# Patient Record
Sex: FEMALE | Race: WHITE | Employment: OTHER | URBAN - METROPOLITAN AREA
[De-identification: names, ages, dates, MRNs, and addresses within clinical notes are randomized per-mention and may not be internally consistent; named-entity substitution may affect disease eponyms.]

---

## 2018-07-13 ENCOUNTER — APPOINTMENT (OUTPATIENT)
Dept: GENERAL RADIOLOGY | Age: 73
DRG: 854 | End: 2018-07-13
Attending: EMERGENCY MEDICINE
Payer: MEDICARE

## 2018-07-13 ENCOUNTER — HOSPITAL ENCOUNTER (INPATIENT)
Age: 73
LOS: 18 days | Discharge: HOME HEALTH | DRG: 854 | End: 2018-07-31
Attending: EMERGENCY MEDICINE | Admitting: INTERNAL MEDICINE
Payer: MEDICARE

## 2018-07-13 DIAGNOSIS — D75.81 MYELOFIBROSIS (HCC): Primary | ICD-10-CM

## 2018-07-13 DIAGNOSIS — R50.81 FEVER IN OTHER DISEASES: ICD-10-CM

## 2018-07-13 DIAGNOSIS — D69.6 THROMBOCYTOPENIA (HCC): ICD-10-CM

## 2018-07-13 DIAGNOSIS — E87.6 HYPOKALEMIA: ICD-10-CM

## 2018-07-13 DIAGNOSIS — K61.1 PERI-RECTAL ABSCESS: ICD-10-CM

## 2018-07-13 DIAGNOSIS — D72.823 LEUKEMOID REACTION: ICD-10-CM

## 2018-07-13 PROBLEM — R50.9 FEVER: Status: ACTIVE | Noted: 2018-07-13

## 2018-07-13 LAB
ALBUMIN SERPL-MCNC: 3.7 G/DL (ref 3.5–5)
ALBUMIN/GLOB SERPL: 1.1 {RATIO} (ref 1.1–2.2)
ALP SERPL-CCNC: 82 U/L (ref 45–117)
ALT SERPL-CCNC: 40 U/L (ref 12–78)
ANION GAP SERPL CALC-SCNC: 16 MMOL/L (ref 5–15)
APPEARANCE UR: CLEAR
AST SERPL-CCNC: 57 U/L (ref 15–37)
BACTERIA URNS QL MICRO: NEGATIVE /HPF
BILIRUB SERPL-MCNC: 1.5 MG/DL (ref 0.2–1)
BILIRUB UR QL: NEGATIVE
BUN SERPL-MCNC: 13 MG/DL (ref 6–20)
BUN/CREAT SERPL: 12 (ref 12–20)
CALCIUM SERPL-MCNC: 8.1 MG/DL (ref 8.5–10.1)
CHLORIDE SERPL-SCNC: 100 MMOL/L (ref 97–108)
CO2 SERPL-SCNC: 21 MMOL/L (ref 21–32)
COLOR UR: ABNORMAL
CREAT SERPL-MCNC: 1.09 MG/DL (ref 0.55–1.02)
EPITH CASTS URNS QL MICRO: ABNORMAL /LPF
FLUAV AG NPH QL IA: NEGATIVE
FLUBV AG NOSE QL IA: NEGATIVE
GLOBULIN SER CALC-MCNC: 3.5 G/DL (ref 2–4)
GLUCOSE SERPL-MCNC: 192 MG/DL (ref 65–100)
GLUCOSE UR STRIP.AUTO-MCNC: NEGATIVE MG/DL
HGB UR QL STRIP: ABNORMAL
HYALINE CASTS URNS QL MICRO: ABNORMAL /LPF (ref 0–5)
KETONES UR QL STRIP.AUTO: 15 MG/DL
LACTATE SERPL-SCNC: 1.8 MMOL/L (ref 0.4–2)
LEUKOCYTE ESTERASE UR QL STRIP.AUTO: NEGATIVE
NITRITE UR QL STRIP.AUTO: NEGATIVE
PH UR STRIP: 6 [PH] (ref 5–8)
POTASSIUM SERPL-SCNC: 3 MMOL/L (ref 3.5–5.1)
PROT SERPL-MCNC: 7.2 G/DL (ref 6.4–8.2)
PROT UR STRIP-MCNC: 100 MG/DL
RBC #/AREA URNS HPF: ABNORMAL /HPF (ref 0–5)
SODIUM SERPL-SCNC: 137 MMOL/L (ref 136–145)
SP GR UR REFRACTOMETRY: 1.02 (ref 1–1.03)
UROBILINOGEN UR QL STRIP.AUTO: 0.2 EU/DL (ref 0.2–1)
WBC URNS QL MICRO: ABNORMAL /HPF (ref 0–4)

## 2018-07-13 PROCEDURE — 87086 URINE CULTURE/COLONY COUNT: CPT | Performed by: EMERGENCY MEDICINE

## 2018-07-13 PROCEDURE — 80053 COMPREHEN METABOLIC PANEL: CPT | Performed by: EMERGENCY MEDICINE

## 2018-07-13 PROCEDURE — 87040 BLOOD CULTURE FOR BACTERIA: CPT | Performed by: EMERGENCY MEDICINE

## 2018-07-13 PROCEDURE — 51701 INSERT BLADDER CATHETER: CPT

## 2018-07-13 PROCEDURE — 65660000000 HC RM CCU STEPDOWN

## 2018-07-13 PROCEDURE — 85025 COMPLETE CBC W/AUTO DIFF WBC: CPT | Performed by: EMERGENCY MEDICINE

## 2018-07-13 PROCEDURE — 77030005563 HC CATH URETH INT MMGH -A

## 2018-07-13 PROCEDURE — 99285 EMERGENCY DEPT VISIT HI MDM: CPT

## 2018-07-13 PROCEDURE — 96365 THER/PROPH/DIAG IV INF INIT: CPT

## 2018-07-13 PROCEDURE — 74011250637 HC RX REV CODE- 250/637: Performed by: EMERGENCY MEDICINE

## 2018-07-13 PROCEDURE — 87804 INFLUENZA ASSAY W/OPTIC: CPT | Performed by: EMERGENCY MEDICINE

## 2018-07-13 PROCEDURE — 71045 X-RAY EXAM CHEST 1 VIEW: CPT

## 2018-07-13 PROCEDURE — 93005 ELECTROCARDIOGRAM TRACING: CPT

## 2018-07-13 PROCEDURE — 83605 ASSAY OF LACTIC ACID: CPT | Performed by: EMERGENCY MEDICINE

## 2018-07-13 PROCEDURE — 74011000258 HC RX REV CODE- 258: Performed by: EMERGENCY MEDICINE

## 2018-07-13 PROCEDURE — 36415 COLL VENOUS BLD VENIPUNCTURE: CPT | Performed by: EMERGENCY MEDICINE

## 2018-07-13 PROCEDURE — 74011250636 HC RX REV CODE- 250/636: Performed by: EMERGENCY MEDICINE

## 2018-07-13 PROCEDURE — 81001 URINALYSIS AUTO W/SCOPE: CPT | Performed by: EMERGENCY MEDICINE

## 2018-07-13 RX ORDER — ACETAMINOPHEN 325 MG/1
650 TABLET ORAL
Status: DISCONTINUED | OUTPATIENT
Start: 2018-07-13 | End: 2018-07-31 | Stop reason: HOSPADM

## 2018-07-13 RX ORDER — ACETAMINOPHEN 325 MG/1
650 TABLET ORAL
COMMUNITY

## 2018-07-13 RX ORDER — LEVOFLOXACIN 5 MG/ML
750 INJECTION, SOLUTION INTRAVENOUS
Status: DISCONTINUED | OUTPATIENT
Start: 2018-07-13 | End: 2018-07-16

## 2018-07-13 RX ORDER — SODIUM CHLORIDE 9 MG/ML
250 INJECTION, SOLUTION INTRAVENOUS AS NEEDED
Status: DISCONTINUED | OUTPATIENT
Start: 2018-07-13 | End: 2018-07-30

## 2018-07-13 RX ORDER — THERA TABS 400 MCG
1 TAB ORAL DAILY
COMMUNITY

## 2018-07-13 RX ORDER — SODIUM CHLORIDE 0.9 % (FLUSH) 0.9 %
5-10 SYRINGE (ML) INJECTION AS NEEDED
Status: DISCONTINUED | OUTPATIENT
Start: 2018-07-13 | End: 2018-07-30

## 2018-07-13 RX ORDER — SODIUM CHLORIDE 9 MG/ML
125 INJECTION, SOLUTION INTRAVENOUS CONTINUOUS
Status: DISCONTINUED | OUTPATIENT
Start: 2018-07-13 | End: 2018-07-15

## 2018-07-13 RX ORDER — SODIUM CHLORIDE 0.9 % (FLUSH) 0.9 %
5-10 SYRINGE (ML) INJECTION EVERY 8 HOURS
Status: DISCONTINUED | OUTPATIENT
Start: 2018-07-13 | End: 2018-07-30

## 2018-07-13 RX ORDER — DEFERASIROX 500 MG/1
1500 TABLET, FOR SUSPENSION ORAL DAILY
COMMUNITY

## 2018-07-13 RX ORDER — ONDANSETRON 2 MG/ML
4 INJECTION INTRAMUSCULAR; INTRAVENOUS
Status: DISCONTINUED | OUTPATIENT
Start: 2018-07-13 | End: 2018-07-31 | Stop reason: HOSPADM

## 2018-07-13 RX ORDER — ACETAMINOPHEN 325 MG/1
650 TABLET ORAL
Status: COMPLETED | OUTPATIENT
Start: 2018-07-13 | End: 2018-07-13

## 2018-07-13 RX ADMIN — SODIUM CHLORIDE 1000 ML: 900 INJECTION, SOLUTION INTRAVENOUS at 21:23

## 2018-07-13 RX ADMIN — SODIUM CHLORIDE 1000 ML: 900 INJECTION, SOLUTION INTRAVENOUS at 20:42

## 2018-07-13 RX ADMIN — CEFEPIME HYDROCHLORIDE 2 G: 2 INJECTION, POWDER, FOR SOLUTION INTRAVENOUS at 21:23

## 2018-07-13 RX ADMIN — ACETAMINOPHEN 650 MG: 325 TABLET ORAL at 22:00

## 2018-07-13 NOTE — IP AVS SNAPSHOT
[address] [phone] Patient: Mishel Hampton MRN: [id number] PNR:4/67/3242 A check florin indicates which time of day the medication should be taken. My Medications START taking these medications Instructions Each Dose to Equal  
 Morning Noon Evening Bedtime  
 amoxicillin-clavulanate 875-125 mg per tablet Commonly known as:  AUGMENTIN Your last dose was:  7/31/2018 
9:03am  
   
 Take 1 Tab by mouth every twelve (12) hours. 1 Tab  
    
  
   
   
  
   
  
 doxycycline 100 mg tablet Commonly known as:  VIBRA-TABS Your last dose was:  7/31/2018 
9:03am  
   
 Take 1 Tab by mouth every twelve (12) hours. 100 mg  
    
  
   
   
  
   
  
 potassium chloride 20 mEq packet Commonly known as:  KLOR-CON Your last dose was:  7/21/2018 11:20am  
   
 Take 1 Packet by mouth daily. 20 mEq  
    
  
   
   
   
  
 senna 8.6 mg tablet Commonly known as:  Tomi Garza Your last dose was:  7/30/2018 8:35pm  
   
 Take 5 Tabs by mouth nightly. PRN  
 5 Tab CHANGE how you take these medications Instructions Each Dose to Equal  
 Morning Noon Evening Bedtime  
 ruxolitinib 15 mg Tab Commonly known as:  Kolby Robert What changed:  when to take this Your last dose was:  7/31/2018 
9:03am  
   
 Take 1 Tab by mouth daily. 15 mg CONTINUE taking these medications Instructions Each Dose to Equal  
 Morning Noon Evening Bedtime  
 acetaminophen 325 mg tablet Commonly known as:  TYLENOL Your last dose was:  7/30/2018 
8:34pm  
   
 Take 650 mg by mouth every four (4) hours as needed for Pain. 650 mg EXJADE 500 mg oral disentegrating tablet Generic drug:  deferasirox Take 1,500 mg by mouth daily. 1500 mg  
    
   
   
   
  
 therapeutic multivitamin tablet Commonly known as:  [facility]  
 Take 1 Tab by mouth daily. 1 Tab Where to Get Your Medications [facility] [id number] Phone:  [phone]  
  amoxicillin-clavulanate 875-125 mg per tablet  
 doxycycline 100 mg tablet  
 potassium chloride 20 mEq packet Information on where to get these meds will be given to you by the nurse or doctor. ! Ask your nurse or doctor about these medications  
  ruxolitinib 15 mg Tab  
 senna 8.6 mg tablet

## 2018-07-13 NOTE — IP AVS SNAPSHOT
[address] [place] [address] 
[phone] Patient: Osmel Restrepo MRN: [id number] EDJ:[birth year] About your hospitalization You were admitted on:  July 13, 2018 You last received care in the:  [facility] 4M POST SURG ORT 1 You were discharged on:  July 31, 2018 Why you were hospitalized Your primary diagnosis was:  Fever Your diagnoses also included:  Myelofibrosis (Hcc), Thrombocytopenia (Hcc), Hypokalemia, Leukocytosis, Nausea And Vomiting, Sirs (Systemic Inflammatory Response Syndrome) (Hcc), Rhona-Rectal Abscess Follow-up Information Follow up With Details Comments Contact Info [facility]  THEY WILL CONTACT YOU TO SCHEDULE A HOME VISIT [address] [facility] [address] [place] [address] Shahriar Story MD On 8/6/2018 Hospital PCP f/u appointment on Monday August 6 @ 4:30 p.m. [address] 
[phone] Discharge Orders None A check florin indicates which time of day the medication should be taken. My Medications START taking these medications Instructions Each Dose to Equal  
 Morning Noon Evening Bedtime  
 amoxicillin-clavulanate 875-125 mg per tablet Commonly known as:  AUGMENTIN Your last dose was:  7/31/2018 
9:03am  
   
 Take 1 Tab by mouth every twelve (12) hours. 1 Tab  
    
  
   
   
  
   
  
 doxycycline 100 mg tablet Commonly known as:  VIBRA-TABS Your last dose was:  7/31/2018 
9:03am  
   
 Take 1 Tab by mouth every twelve (12) hours. 100 mg  
    
  
   
   
  
   
  
 potassium chloride 20 mEq packet Commonly known as:  KLOR-CON Your last dose was:  7/21/2018 11:20am  
   
 Take 1 Packet by mouth daily. 20 mEq  
    
  
   
   
   
  
 senna 8.6 mg tablet Commonly known as:  Tomi Chong Sons Your last dose was:  7/30/2018 8:35pm  
   
 Take 5 Tabs by mouth nightly. PRN  
 5 Tab CHANGE how you take these medications Instructions Each Dose to Equal  
 Morning Noon Evening Bedtime  
 ruxolitinib 15 mg Tab Commonly known as:  Marcellus Márquez What changed:  when to take this Your last dose was:  2018 
9:03am  
   
 Take 1 Tab by mouth daily. 15 mg CONTINUE taking these medications Instructions Each Dose to Equal  
 Morning Noon Evening Bedtime  
 acetaminophen 325 mg tablet Commonly known as:  TYLENOL Your last dose was:  2018 
8:34pm  
   
 Take 650 mg by mouth every four (4) hours as needed for Pain. 650 mg EXJADE 500 mg oral disentegrating tablet Generic drug:  deferasirox Take 1,500 mg by mouth daily. 1500 mg  
    
   
   
   
  
 therapeutic multivitamin tablet Commonly known as:  [facility] Take 1 Tab by mouth daily. 1 Tab Where to Get Your Medications These medications were sent to [facility] [id number] Phone:  [phone]  
  amoxicillin-clavulanate 875-125 mg per tablet  
 doxycycline 100 mg tablet  
 potassium chloride 20 mEq packet Information on where to get these meds will be given to you by the nurse or doctor. ! Ask your nurse or doctor about these medications  
  ruxolitinib 15 mg Tab  
 senna 8.6 mg tablet Discharge Instructions Ruxolitinib [facility]) - patient's own med. Please send home with the patient upon discharge. Patient Discharge Instructions Padma Edmonds / [id number] : 1945 Admitted 2018 Discharged: 2018 8:24 AM  
 
ACUTE DIAGNOSES: 
Fever PERIRECTAL ABSCESS PERIRECTAL ABSCESS PERIRECTAL ABSCESS 
 
CHRONIC MEDICAL DIAGNOSES: 
Problem List as of 2018  Date Reviewed: 2018 Codes Class Noted - Resolved  [facility] ICD-10-CM: X60.97 
 ICD-9-CM: 289.83  7/14/2018 - Present Thrombocytopenia ([facility] ICD-10-CM: D69.6 ICD-9-CM: 287.5  7/14/2018 - Present Hypokalemia ICD-10-CM: E87.6 ICD-9-CM: 276.8  7/14/2018 - Present Leukocytosis ICD-10-CM: L22.530 ICD-9-CM: 288.60  7/14/2018 - Present Nausea and vomiting ICD-10-CM: R11.2 ICD-9-CM: 787.01  7/14/2018 - Present SIRS (systemic inflammatory response syndrome) (HCC) ICD-10-CM: R65.10 ICD-9-CM: 995.90  7/14/2018 - Present Rhona-rectal abscess ICD-10-CM: K61.1 ICD-9-CM: 121  7/14/2018 - Present * (Principal)Fever ICD-10-CM: R50.9 ICD-9-CM: 780.60  7/13/2018 - Present DISCHARGE MEDICATIONS:  
 
 
 
· It is important that you take the medication exactly as they are prescribed. · Keep your medication in the bottles provided by the pharmacist and keep a list of the medication names, dosages, and times to be taken in your wallet. · Do not take other medications without consulting your doctor. DIET:  Regular Diet ACTIVITY: Activity as tolerated ADDITIONAL INFORMATION: If you experience any of the following symptoms then please call your primary care physician or return to the emergency room if you cannot get hold of your doctor: Fever, chills, nausea, vomiting, diarrhea, change in mentation, falling, bleeding, shortness of breath. FOLLOW UP CARE: 
Dr. Lucas Pruitt MD  you are to call and set up an appointment to see them with in 1 week. Follow-up with specialists at directed by them Information obtained by : 
I understand that if any problems occur once I am at home I am to contact my physician. I understand and acknowledge receipt of the instructions indicated above. Physician's or R.N.'s Signature                                                                  Date/Time Patient or Representative Signature                                                          Date/Time [place] Announcement We are excited to announce that we are making your provider's discharge notes available to you in [employer]. You will see these notes when they are completed and signed by the physician that discharged you from your recent hospital stay. If you have any questions or concerns about any information you see in [employer], please call the Health Information Department where you were seen or reach out to your Primary Care Provider for more information about your plan of care. Introducing [facility] SERVICES! [employer] introduces [employer] patient portal. Now you can access parts of your medical record, email your doctor's office, and request medication refills online. 1. In your internet browser, go to [email or web address] 2. Click on the First Time User? Click Here link in the Sign In box. You will see the New Member Sign Up page. 3. Enter your [employer] Access Code exactly as it appears below. You will not need to use this code after youve completed the sign-up process. If you do not sign up before the expiration date, you must request a new code. · [employer] Access Code: [email or web address] Expires: 10/11/2018  8:13 PM 
 
4. Enter the last four digits of your Social Security Number (xxxx) and Date of Birth (mm/dd/yyyy) as indicated and click Submit. You will be taken to the next sign-up page. 5. Create a [employer] ID. This will be your [employer] login ID and cannot be changed, so think of one that is secure and easy to remember. 6. Create a [employer] password. You can change your password at any time. 7. Enter your Password Reset Question and Answer. This can be used at a later time if you forget your password. 8. Enter your e-mail address. You will receive e-mail notification when new information is available in [address] 9. Click Sign Up. You can now view and download portions of your medical record. 10. Click the Download Summary menu link to download a portable copy of your medical information. If you have questions, please visit the Frequently Asked Questions section of the [employer] website. Remember, [employer] is NOT to be used for urgent needs. For medical emergencies, dial 911. Now available from your iPhone and Android! Introducing Jordy Babcock As a [facility] patient, I wanted to make you aware of our electronic visit tool called Jordy Babcock. [employer] 24/7 allows you to connect within minutes with a medical provider 24 hours a day, seven days a week via a mobile device or tablet or logging into a secure website from your computer. You can access Jordy Babcock from anywhere in the [place]. A virtual visit might be right for you when you have a simple condition and feel like you just dont want to get out of bed, or cant get away from work for an appointment, when your regular [employer] provider is not available (evenings, weekends or holidays), or when youre out of town and need minor care. Electronic visits cost only $49 and if the [employer] 24/7 provider determines a prescription is needed to treat your condition, one can be electronically transmitted to a nearby pharmacy*. Please take a moment to enroll today if you have not already done so. The enrollment process is free and takes just a few minutes. To enroll, please download the [facility] 24/7 jessica to your tablet or phone, or visit [email or web address] to enroll on your computer.    
And, as an [address] [place] [address] patient with a My 1% account, the results of your visits will be scanned into your electronic medical record and your primary care provider will be able to view the scanned results. We urge you to continue to see your regular [facility] provider for your ongoing medical care. And while your primary care provider may not be the one available when you seek a [employer] virtual visit, the peace of mind you get from getting a real diagnosis real time can be priceless. For more information on [employer], view our Frequently Asked Questions (FAQs) at [email or web address]. Sincerely, 
 
Violet Santana MD 
Chief Medical Officer David John *:  certain medications cannot be prescribed via [employer] Providers Seen During Your Hospitalization Provider Specialty Primary office phone Ortiz Sprague MD Emergency Medicine [phone] Janice Craig MD Internal Medicine [phone] Nunu Aaron MD [facility] [phone] Your Primary Care Physician (PCP) Primary Care Physician Office Phone Office Fax Tyshawn Raquel [phone] You are allergic to the following No active allergies Recent Documentation Height Weight BMI OB Status Smoking Status 1.676 m 64.2 kg 22.84 kg/m2 Postmenopausal Never Smoker Emergency Contacts Name Discharge Info Relation Home Work Mobile [address] [facility] CAREGIVER [3] Daughter [21] [phone] [address] CAREGIVER [3] Spouse [3] [phone] Patient Belongings The following personal items are in your possession at time of discharge: 
  Dental Appliances: None  Visual Aid: Glasses, At bedside, With patient      Home Medications: None   Jewelry: None  Clothing: None    Other Valuables: None Please provide this summary of care documentation to your next provider. Signatures-by signing, you are acknowledging that this After Visit Summary has been reviewed with you and you have received a copy.   
  
 
  
    
    
 Patient Signature: ____________________________________________________________ Date:  ____________________________________________________________  
  
Salvatore Police Provider Signature:  ____________________________________________________________ Date:  ____________________________________________________________

## 2018-07-14 ENCOUNTER — ANESTHESIA EVENT (OUTPATIENT)
Dept: SURGERY | Age: 73
DRG: 854 | End: 2018-07-14
Payer: MEDICARE

## 2018-07-14 ENCOUNTER — DOCUMENTATION ONLY (OUTPATIENT)
Dept: ONCOLOGY | Age: 73
End: 2018-07-14

## 2018-07-14 ENCOUNTER — ANESTHESIA (OUTPATIENT)
Dept: SURGERY | Age: 73
DRG: 854 | End: 2018-07-14
Payer: MEDICARE

## 2018-07-14 PROBLEM — D72.829 LEUKOCYTOSIS: Status: ACTIVE | Noted: 2018-07-14

## 2018-07-14 PROBLEM — E87.6 HYPOKALEMIA: Status: ACTIVE | Noted: 2018-07-14

## 2018-07-14 PROBLEM — D69.6 THROMBOCYTOPENIA (HCC): Status: ACTIVE | Noted: 2018-07-14

## 2018-07-14 PROBLEM — D75.81 MYELOFIBROSIS (HCC): Status: ACTIVE | Noted: 2018-07-14

## 2018-07-14 PROBLEM — R11.2 NAUSEA AND VOMITING: Status: ACTIVE | Noted: 2018-07-14

## 2018-07-14 PROBLEM — R65.10 SIRS (SYSTEMIC INFLAMMATORY RESPONSE SYNDROME) (HCC): Status: ACTIVE | Noted: 2018-07-14

## 2018-07-14 PROBLEM — K61.1 PERI-RECTAL ABSCESS: Status: ACTIVE | Noted: 2018-07-14

## 2018-07-14 LAB
ALBUMIN SERPL-MCNC: 3 G/DL (ref 3.5–5)
ALBUMIN/GLOB SERPL: 1 {RATIO} (ref 1.1–2.2)
ALP SERPL-CCNC: 70 U/L (ref 45–117)
ALT SERPL-CCNC: 35 U/L (ref 12–78)
ANION GAP SERPL CALC-SCNC: 13 MMOL/L (ref 5–15)
AST SERPL-CCNC: 46 U/L (ref 15–37)
ATRIAL RATE: 103 BPM
BASOPHILS # BLD: 0 K/UL (ref 0–0.1)
BASOPHILS # BLD: 0 K/UL (ref 0–0.1)
BASOPHILS NFR BLD: 0 % (ref 0–1)
BASOPHILS NFR BLD: 0 % (ref 0–1)
BILIRUB SERPL-MCNC: 1 MG/DL (ref 0.2–1)
BLASTS NFR BLD MANUAL: 5 %
BLASTS NFR BLD MANUAL: 8 %
BUN SERPL-MCNC: 14 MG/DL (ref 6–20)
BUN/CREAT SERPL: 13 (ref 12–20)
CALCIUM SERPL-MCNC: 7.3 MG/DL (ref 8.5–10.1)
CALCULATED P AXIS, ECG09: 37 DEGREES
CALCULATED R AXIS, ECG10: 5 DEGREES
CALCULATED T AXIS, ECG11: -128 DEGREES
CHLORIDE SERPL-SCNC: 106 MMOL/L (ref 97–108)
CO2 SERPL-SCNC: 20 MMOL/L (ref 21–32)
CREAT SERPL-MCNC: 1.11 MG/DL (ref 0.55–1.02)
DIAGNOSIS, 93000: NORMAL
DIFFERENTIAL METHOD BLD: ABNORMAL
DIFFERENTIAL METHOD BLD: ABNORMAL
EOSINOPHIL # BLD: 0 K/UL (ref 0–0.4)
EOSINOPHIL # BLD: 0 K/UL (ref 0–0.4)
EOSINOPHIL NFR BLD: 0 % (ref 0–7)
EOSINOPHIL NFR BLD: 0 % (ref 0–7)
ERYTHROCYTE [DISTWIDTH] IN BLOOD BY AUTOMATED COUNT: 19.9 % (ref 11.5–14.5)
ERYTHROCYTE [DISTWIDTH] IN BLOOD BY AUTOMATED COUNT: 20.3 % (ref 11.5–14.5)
GLOBULIN SER CALC-MCNC: 3 G/DL (ref 2–4)
GLUCOSE SERPL-MCNC: 148 MG/DL (ref 65–100)
HCT VFR BLD AUTO: 18.2 % (ref 35–47)
HCT VFR BLD AUTO: 22.3 % (ref 35–47)
HCT VFR BLD AUTO: 27.8 % (ref 35–47)
HGB BLD-MCNC: 5.5 G/DL (ref 11.5–16)
HGB BLD-MCNC: 6.9 G/DL (ref 11.5–16)
HGB BLD-MCNC: 8.7 G/DL (ref 11.5–16)
IMM GRANULOCYTES # BLD: 0 K/UL
IMM GRANULOCYTES # BLD: 0 K/UL
IMM GRANULOCYTES NFR BLD AUTO: 0 %
IMM GRANULOCYTES NFR BLD AUTO: 0 %
LACTATE SERPL-SCNC: 1.7 MMOL/L (ref 0.4–2)
LYMPHOCYTES # BLD: 2 K/UL (ref 0.8–3.5)
LYMPHOCYTES # BLD: 2.8 K/UL (ref 0.8–3.5)
LYMPHOCYTES NFR BLD: 13 % (ref 12–49)
LYMPHOCYTES NFR BLD: 9 % (ref 12–49)
MCH RBC QN AUTO: 29.4 PG (ref 26–34)
MCH RBC QN AUTO: 29.7 PG (ref 26–34)
MCHC RBC AUTO-ENTMCNC: 30.2 G/DL (ref 30–36.5)
MCHC RBC AUTO-ENTMCNC: 30.9 G/DL (ref 30–36.5)
MCV RBC AUTO: 96.1 FL (ref 80–99)
MCV RBC AUTO: 97.3 FL (ref 80–99)
METAMYELOCYTES NFR BLD MANUAL: 4 %
MONOCYTES # BLD: 5 K/UL (ref 0–1)
MONOCYTES # BLD: 6.6 K/UL (ref 0–1)
MONOCYTES NFR BLD: 22 % (ref 5–13)
MONOCYTES NFR BLD: 30 % (ref 5–13)
MYELOCYTES NFR BLD MANUAL: 1 %
NEUTS BAND NFR BLD MANUAL: 6 % (ref 0–6)
NEUTS SEG # BLD: 11.4 K/UL (ref 1.8–8)
NEUTS SEG # BLD: 12.5 K/UL (ref 1.8–8)
NEUTS SEG NFR BLD: 49 % (ref 32–75)
NEUTS SEG NFR BLD: 52 % (ref 32–75)
NRBC # BLD: 1.36 K/UL (ref 0–0.01)
NRBC # BLD: 2.31 K/UL (ref 0–0.01)
NRBC BLD-RTO: 10.2 PER 100 WBC
NRBC BLD-RTO: 6.2 PER 100 WBC
P-R INTERVAL, ECG05: 134 MS
PATH REV BLD -IMP: ABNORMAL
PLATELET # BLD AUTO: 57 K/UL (ref 150–400)
PLATELET # BLD AUTO: 69 K/UL (ref 150–400)
PMV BLD AUTO: 10.5 FL (ref 8.9–12.9)
POTASSIUM SERPL-SCNC: 3.3 MMOL/L (ref 3.5–5.1)
PROMYELOCYTES NFR BLD MANUAL: 1 %
PROT SERPL-MCNC: 6 G/DL (ref 6.4–8.2)
Q-T INTERVAL, ECG07: 330 MS
QRS DURATION, ECG06: 88 MS
QTC CALCULATION (BEZET), ECG08: 432 MS
RBC # BLD AUTO: 1.87 M/UL (ref 3.8–5.2)
RBC # BLD AUTO: 2.32 M/UL (ref 3.8–5.2)
RBC MORPH BLD: ABNORMAL
RBC MORPH BLD: ABNORMAL
SODIUM SERPL-SCNC: 139 MMOL/L (ref 136–145)
VENTRICULAR RATE, ECG03: 103 BPM
WBC # BLD AUTO: 21.9 K/UL (ref 3.6–11)
WBC # BLD AUTO: 22.7 K/UL (ref 3.6–11)

## 2018-07-14 PROCEDURE — 74011250637 HC RX REV CODE- 250/637: Performed by: INTERNAL MEDICINE

## 2018-07-14 PROCEDURE — 86905 BLOOD TYPING RBC ANTIGENS: CPT | Performed by: EMERGENCY MEDICINE

## 2018-07-14 PROCEDURE — 76210000006 HC OR PH I REC 0.5 TO 1 HR: Performed by: SURGERY

## 2018-07-14 PROCEDURE — 86922 COMPATIBILITY TEST ANTIGLOB: CPT | Performed by: EMERGENCY MEDICINE

## 2018-07-14 PROCEDURE — 74011250637 HC RX REV CODE- 250/637: Performed by: SURGERY

## 2018-07-14 PROCEDURE — 83605 ASSAY OF LACTIC ACID: CPT | Performed by: INTERNAL MEDICINE

## 2018-07-14 PROCEDURE — 74011250636 HC RX REV CODE- 250/636: Performed by: INTERNAL MEDICINE

## 2018-07-14 PROCEDURE — 86902 BLOOD TYPE ANTIGEN DONOR EA: CPT | Performed by: EMERGENCY MEDICINE

## 2018-07-14 PROCEDURE — 86921 COMPATIBILITY TEST INCUBATE: CPT | Performed by: EMERGENCY MEDICINE

## 2018-07-14 PROCEDURE — 74011250636 HC RX REV CODE- 250/636

## 2018-07-14 PROCEDURE — 76060000032 HC ANESTHESIA 0.5 TO 1 HR: Performed by: SURGERY

## 2018-07-14 PROCEDURE — 77010033678 HC OXYGEN DAILY

## 2018-07-14 PROCEDURE — 85018 HEMOGLOBIN: CPT | Performed by: FAMILY MEDICINE

## 2018-07-14 PROCEDURE — 87186 SC STD MICRODIL/AGAR DIL: CPT | Performed by: FAMILY MEDICINE

## 2018-07-14 PROCEDURE — 87075 CULTR BACTERIA EXCEPT BLOOD: CPT | Performed by: FAMILY MEDICINE

## 2018-07-14 PROCEDURE — 87070 CULTURE OTHR SPECIMN AEROBIC: CPT | Performed by: FAMILY MEDICINE

## 2018-07-14 PROCEDURE — 86920 COMPATIBILITY TEST SPIN: CPT | Performed by: EMERGENCY MEDICINE

## 2018-07-14 PROCEDURE — 0JB90ZZ EXCISION OF BUTTOCK SUBCUTANEOUS TISSUE AND FASCIA, OPEN APPROACH: ICD-10-PCS | Performed by: SURGERY

## 2018-07-14 PROCEDURE — 85025 COMPLETE CBC W/AUTO DIFF WBC: CPT | Performed by: INTERNAL MEDICINE

## 2018-07-14 PROCEDURE — 65660000000 HC RM CCU STEPDOWN

## 2018-07-14 PROCEDURE — 86870 RBC ANTIBODY IDENTIFICATION: CPT | Performed by: EMERGENCY MEDICINE

## 2018-07-14 PROCEDURE — 77030011640 HC PAD GRND REM COVD -A: Performed by: SURGERY

## 2018-07-14 PROCEDURE — P9016 RBC LEUKOCYTES REDUCED: HCPCS | Performed by: EMERGENCY MEDICINE

## 2018-07-14 PROCEDURE — 88305 TISSUE EXAM BY PATHOLOGIST: CPT | Performed by: SURGERY

## 2018-07-14 PROCEDURE — 36415 COLL VENOUS BLD VENIPUNCTURE: CPT | Performed by: FAMILY MEDICINE

## 2018-07-14 PROCEDURE — 86901 BLOOD TYPING SEROLOGIC RH(D): CPT | Performed by: EMERGENCY MEDICINE

## 2018-07-14 PROCEDURE — 74011250637 HC RX REV CODE- 250/637: Performed by: FAMILY MEDICINE

## 2018-07-14 PROCEDURE — 87077 CULTURE AEROBIC IDENTIFY: CPT | Performed by: FAMILY MEDICINE

## 2018-07-14 PROCEDURE — 74011000250 HC RX REV CODE- 250

## 2018-07-14 PROCEDURE — 77030018836 HC SOL IRR NACL ICUM -A: Performed by: SURGERY

## 2018-07-14 PROCEDURE — 36430 TRANSFUSION BLD/BLD COMPNT: CPT

## 2018-07-14 PROCEDURE — 76010000138 HC OR TIME 0.5 TO 1 HR: Performed by: SURGERY

## 2018-07-14 PROCEDURE — 80053 COMPREHEN METABOLIC PANEL: CPT | Performed by: INTERNAL MEDICINE

## 2018-07-14 PROCEDURE — 74011000258 HC RX REV CODE- 258: Performed by: INTERNAL MEDICINE

## 2018-07-14 RX ORDER — SUCCINYLCHOLINE CHLORIDE 20 MG/ML
INJECTION INTRAMUSCULAR; INTRAVENOUS AS NEEDED
Status: DISCONTINUED | OUTPATIENT
Start: 2018-07-14 | End: 2018-07-14 | Stop reason: HOSPADM

## 2018-07-14 RX ORDER — SENNOSIDES 8.6 MG/1
2 TABLET ORAL
Status: DISCONTINUED | OUTPATIENT
Start: 2018-07-14 | End: 2018-07-18

## 2018-07-14 RX ORDER — PANTOPRAZOLE SODIUM 40 MG/1
40 TABLET, DELAYED RELEASE ORAL
Status: DISCONTINUED | OUTPATIENT
Start: 2018-07-14 | End: 2018-07-31 | Stop reason: HOSPADM

## 2018-07-14 RX ORDER — ONDANSETRON 2 MG/ML
INJECTION INTRAMUSCULAR; INTRAVENOUS AS NEEDED
Status: DISCONTINUED | OUTPATIENT
Start: 2018-07-14 | End: 2018-07-14 | Stop reason: HOSPADM

## 2018-07-14 RX ORDER — AMOXICILLIN 250 MG
1 CAPSULE ORAL DAILY
Status: DISCONTINUED | OUTPATIENT
Start: 2018-07-14 | End: 2018-07-19

## 2018-07-14 RX ORDER — ROCURONIUM BROMIDE 10 MG/ML
INJECTION, SOLUTION INTRAVENOUS AS NEEDED
Status: DISCONTINUED | OUTPATIENT
Start: 2018-07-14 | End: 2018-07-14 | Stop reason: HOSPADM

## 2018-07-14 RX ORDER — ONDANSETRON 2 MG/ML
4 INJECTION INTRAMUSCULAR; INTRAVENOUS AS NEEDED
Status: DISCONTINUED | OUTPATIENT
Start: 2018-07-14 | End: 2018-07-14 | Stop reason: HOSPADM

## 2018-07-14 RX ORDER — HYDROMORPHONE HYDROCHLORIDE 2 MG/1
1 TABLET ORAL
Status: DISCONTINUED | OUTPATIENT
Start: 2018-07-14 | End: 2018-07-31 | Stop reason: HOSPADM

## 2018-07-14 RX ORDER — POTASSIUM CHLORIDE 750 MG/1
40 TABLET, FILM COATED, EXTENDED RELEASE ORAL EVERY 4 HOURS
Status: COMPLETED | OUTPATIENT
Start: 2018-07-14 | End: 2018-07-14

## 2018-07-14 RX ORDER — PROPOFOL 10 MG/ML
INJECTION, EMULSION INTRAVENOUS AS NEEDED
Status: DISCONTINUED | OUTPATIENT
Start: 2018-07-14 | End: 2018-07-14 | Stop reason: HOSPADM

## 2018-07-14 RX ORDER — SODIUM CHLORIDE, SODIUM LACTATE, POTASSIUM CHLORIDE, CALCIUM CHLORIDE 600; 310; 30; 20 MG/100ML; MG/100ML; MG/100ML; MG/100ML
INJECTION, SOLUTION INTRAVENOUS
Status: DISCONTINUED | OUTPATIENT
Start: 2018-07-14 | End: 2018-07-14 | Stop reason: HOSPADM

## 2018-07-14 RX ORDER — OXYCODONE AND ACETAMINOPHEN 5; 325 MG/1; MG/1
1 TABLET ORAL
Status: DISCONTINUED | OUTPATIENT
Start: 2018-07-14 | End: 2018-07-31 | Stop reason: HOSPADM

## 2018-07-14 RX ORDER — SODIUM CHLORIDE 0.9 % (FLUSH) 0.9 %
5-10 SYRINGE (ML) INJECTION EVERY 8 HOURS
Status: DISCONTINUED | OUTPATIENT
Start: 2018-07-14 | End: 2018-07-14 | Stop reason: HOSPADM

## 2018-07-14 RX ORDER — DIPHENHYDRAMINE HYDROCHLORIDE 50 MG/ML
12.5 INJECTION, SOLUTION INTRAMUSCULAR; INTRAVENOUS AS NEEDED
Status: DISCONTINUED | OUTPATIENT
Start: 2018-07-14 | End: 2018-07-14 | Stop reason: HOSPADM

## 2018-07-14 RX ORDER — FENTANYL CITRATE 50 UG/ML
INJECTION, SOLUTION INTRAMUSCULAR; INTRAVENOUS AS NEEDED
Status: DISCONTINUED | OUTPATIENT
Start: 2018-07-14 | End: 2018-07-14 | Stop reason: HOSPADM

## 2018-07-14 RX ORDER — LIDOCAINE HYDROCHLORIDE 10 MG/ML
0.1 INJECTION, SOLUTION EPIDURAL; INFILTRATION; INTRACAUDAL; PERINEURAL AS NEEDED
Status: DISCONTINUED | OUTPATIENT
Start: 2018-07-14 | End: 2018-07-14 | Stop reason: HOSPADM

## 2018-07-14 RX ORDER — SODIUM CHLORIDE, SODIUM LACTATE, POTASSIUM CHLORIDE, CALCIUM CHLORIDE 600; 310; 30; 20 MG/100ML; MG/100ML; MG/100ML; MG/100ML
125 INJECTION, SOLUTION INTRAVENOUS CONTINUOUS
Status: DISCONTINUED | OUTPATIENT
Start: 2018-07-14 | End: 2018-07-14 | Stop reason: HOSPADM

## 2018-07-14 RX ORDER — SODIUM CHLORIDE 0.9 % (FLUSH) 0.9 %
5-10 SYRINGE (ML) INJECTION AS NEEDED
Status: DISCONTINUED | OUTPATIENT
Start: 2018-07-14 | End: 2018-07-14 | Stop reason: HOSPADM

## 2018-07-14 RX ORDER — HYDROMORPHONE HYDROCHLORIDE 1 MG/ML
.25-1 INJECTION, SOLUTION INTRAMUSCULAR; INTRAVENOUS; SUBCUTANEOUS
Status: DISCONTINUED | OUTPATIENT
Start: 2018-07-14 | End: 2018-07-14 | Stop reason: HOSPADM

## 2018-07-14 RX ORDER — POLYETHYLENE GLYCOL 3350 17 G/17G
17 POWDER, FOR SOLUTION ORAL
Status: DISCONTINUED | OUTPATIENT
Start: 2018-07-14 | End: 2018-07-17

## 2018-07-14 RX ORDER — POTASSIUM CHLORIDE 750 MG/1
40 TABLET, FILM COATED, EXTENDED RELEASE ORAL
Status: COMPLETED | OUTPATIENT
Start: 2018-07-14 | End: 2018-07-14

## 2018-07-14 RX ADMIN — PROPOFOL 100 MG: 10 INJECTION, EMULSION INTRAVENOUS at 13:44

## 2018-07-14 RX ADMIN — ONDANSETRON 4 MG: 2 INJECTION INTRAMUSCULAR; INTRAVENOUS at 21:14

## 2018-07-14 RX ADMIN — Medication 10 ML: at 01:42

## 2018-07-14 RX ADMIN — ONDANSETRON 4 MG: 2 INJECTION INTRAMUSCULAR; INTRAVENOUS at 06:53

## 2018-07-14 RX ADMIN — POTASSIUM CHLORIDE 40 MEQ: 750 TABLET, EXTENDED RELEASE ORAL at 06:50

## 2018-07-14 RX ADMIN — CEFEPIME HYDROCHLORIDE 2 G: 2 INJECTION, POWDER, FOR SOLUTION INTRAVENOUS at 08:04

## 2018-07-14 RX ADMIN — STANDARDIZED SENNA CONCENTRATE AND DOCUSATE SODIUM 1 TABLET: 8.6; 5 TABLET, FILM COATED ORAL at 01:11

## 2018-07-14 RX ADMIN — ONDANSETRON 4 MG: 2 INJECTION INTRAMUSCULAR; INTRAVENOUS at 17:31

## 2018-07-14 RX ADMIN — SODIUM CHLORIDE 125 ML/HR: 900 INJECTION, SOLUTION INTRAVENOUS at 01:13

## 2018-07-14 RX ADMIN — ACETAMINOPHEN 650 MG: 325 TABLET ORAL at 06:50

## 2018-07-14 RX ADMIN — VANCOMYCIN HYDROCHLORIDE 1750 MG: 10 INJECTION, POWDER, LYOPHILIZED, FOR SOLUTION INTRAVENOUS at 01:39

## 2018-07-14 RX ADMIN — Medication 10 ML: at 17:37

## 2018-07-14 RX ADMIN — CEFEPIME HYDROCHLORIDE 2 G: 2 INJECTION, POWDER, FOR SOLUTION INTRAVENOUS at 20:38

## 2018-07-14 RX ADMIN — POTASSIUM CHLORIDE 40 MEQ: 750 TABLET, EXTENDED RELEASE ORAL at 01:42

## 2018-07-14 RX ADMIN — Medication 10 ML: at 21:21

## 2018-07-14 RX ADMIN — POTASSIUM CHLORIDE 40 MEQ: 750 TABLET, EXTENDED RELEASE ORAL at 11:27

## 2018-07-14 RX ADMIN — ACETAMINOPHEN 650 MG: 325 TABLET ORAL at 02:56

## 2018-07-14 RX ADMIN — ONDANSETRON 4 MG: 2 INJECTION INTRAMUSCULAR; INTRAVENOUS at 14:09

## 2018-07-14 RX ADMIN — SUCCINYLCHOLINE CHLORIDE 180 MG: 20 INJECTION INTRAMUSCULAR; INTRAVENOUS at 13:44

## 2018-07-14 RX ADMIN — POLYETHYLENE GLYCOL 3350 17 G: 17 POWDER, FOR SOLUTION ORAL at 01:11

## 2018-07-14 RX ADMIN — ONDANSETRON 4 MG: 2 INJECTION INTRAMUSCULAR; INTRAVENOUS at 11:28

## 2018-07-14 RX ADMIN — Medication 10 ML: at 08:05

## 2018-07-14 RX ADMIN — ONDANSETRON 4 MG: 2 INJECTION INTRAMUSCULAR; INTRAVENOUS at 03:00

## 2018-07-14 RX ADMIN — ROCURONIUM BROMIDE 5 MG: 10 INJECTION, SOLUTION INTRAVENOUS at 13:44

## 2018-07-14 RX ADMIN — ACETAMINOPHEN 650 MG: 325 TABLET ORAL at 11:28

## 2018-07-14 RX ADMIN — SODIUM CHLORIDE, SODIUM LACTATE, POTASSIUM CHLORIDE, CALCIUM CHLORIDE: 600; 310; 30; 20 INJECTION, SOLUTION INTRAVENOUS at 13:43

## 2018-07-14 RX ADMIN — SENNOSIDES 17.2 MG: 8.6 TABLET, FILM COATED ORAL at 21:14

## 2018-07-14 RX ADMIN — OXYCODONE HYDROCHLORIDE AND ACETAMINOPHEN 1 TABLET: 5; 325 TABLET ORAL at 21:14

## 2018-07-14 RX ADMIN — ACETAMINOPHEN 650 MG: 325 TABLET ORAL at 17:31

## 2018-07-14 RX ADMIN — SUCCINYLCHOLINE CHLORIDE 30 MG: 20 INJECTION INTRAMUSCULAR; INTRAVENOUS at 14:01

## 2018-07-14 RX ADMIN — PANTOPRAZOLE SODIUM 40 MG: 40 TABLET, DELAYED RELEASE ORAL at 06:53

## 2018-07-14 RX ADMIN — LEVOFLOXACIN 750 MG: 5 INJECTION, SOLUTION INTRAVENOUS at 01:13

## 2018-07-14 RX ADMIN — SODIUM CHLORIDE 125 ML/HR: 900 INJECTION, SOLUTION INTRAVENOUS at 22:14

## 2018-07-14 RX ADMIN — FENTANYL CITRATE 25 MCG: 50 INJECTION, SOLUTION INTRAMUSCULAR; INTRAVENOUS at 14:00

## 2018-07-14 RX ADMIN — FENTANYL CITRATE 50 MCG: 50 INJECTION, SOLUTION INTRAMUSCULAR; INTRAVENOUS at 13:56

## 2018-07-14 NOTE — PROGRESS NOTES
Daily Progress Note: 7/14/2018  Sakina Gross MD    Assessment/Plan:   Fever (7/13/2018)/  Leukocytosis (7/14/2018)/  SIRS (systemic inflammatory response syndrome) ([facility] (7/14/2018): in the setting of immunosuppression. Likely etiology maybe left rhona-rectal abscess. --broad spectrum abx     ?Rhona-rectal abscess (7/14/2018): left side. Likely source of fever. IV antibiotics as above. --To OR today for I+D per Dr Israel Amezcua     Myelofibrosis [facility]) (7/14/2018)/ Anemia/Thrombocytopenia ([facility] (7/14/2018): follows up at [facility]. Hgb down to 5.5.  --consult heme   --Transfuse 2 unit PRBC when blood available     Hypokalemia (7/14/2018): likely from vomiting. Replete and follow K+     Nausea and vomiting (7/14/2018): likely from infection as above. Hydrate. IV anti-emetics. Monitor     Code Status:  Full     Problem List:  Problem List as of 7/14/2018  Date Reviewed: 7/14/2018          Codes Class Noted - Resolved    Myelofibrosis ([facility] ICD-10-CM: D75.81  ICD-9-CM: 289.83  7/14/2018 - Present        Thrombocytopenia ([facility] ICD-10-CM: D69.6  ICD-9-CM: 287.5  7/14/2018 - Present        Hypokalemia ICD-10-CM: E87.6  ICD-9-CM: 276.8  7/14/2018 - Present        Leukocytosis ICD-10-CM: N81.916  ICD-9-CM: 288.60  7/14/2018 - Present        Nausea and vomiting ICD-10-CM: R11.2  ICD-9-CM: 787.01  7/14/2018 - Present        SIRS (systemic inflammatory response syndrome) ([facility] ICD-10-CM: R65.10  ICD-9-CM: 995.90  7/14/2018 - Present        Rhona-rectal abscess ICD-10-CM: K61.1  ICD-9-CM: 988  7/14/2018 - Present        * (Principal)Fever ICD-10-CM: R50.9  ICD-9-CM: 780.60  7/13/2018 - Present              Subjective:   Ms. Pineda Bautista is a 75 y.o. female who is being admitted for Fever. Ms. Pineda Bautista presented to our Emergency Department today complaining of intermittent fever and chills associated with generalized weakness.  She has also been having nausea and vomiting but denies any headaches, flu like or respiratory symptoms, No abdominal discomfort or diarrhea. She has not has any urinary symptoms. No overt focal symptoms. She does have a Hx of myelofibrosis and follows up at [facility]. Work up thus far has been unremarkable. In light of her immunosuppression, she will be admitted for further management. [Dr Skyler Rodriguez     : pt found to have perirectal abscess. Afebrile since 5AM.  Still nauseated. NPO for I+D in OR today with Dr Jostin Moreira. Review of Systems:   A comprehensive review of systems was negative except for that written in the HPI. Objective:   Physical Exam:     Visit Vitals    BP 97/49 (BP 1 Location: Left arm, BP Patient Position: Supine)    Pulse 87    Temp 98.3 °F (36.8 °C)    Resp 22    Ht 5' 6\" (1.676 m)    Wt 68.1 kg (150 lb 3.2 oz)    SpO2 98%    BMI 24.24 kg/m2      O2 Device: Room air    Temp (24hrs), Av.5 °F (38.1 °C), Min:98.3 °F (36.8 °C), Max:103.1 °F (39.5 °C)        1 -  0700  In: 2014 [P.O.:500; I.V.:3270]  Out: 950 [Urine:300]    General:  Alert, cooperative, no distress, appears stated age, nauseated appearing   Head:  Normocephalic, without obvious abnormality, atraumatic. Eyes:  Conjunctivae/corneas clear. PERRL, EOMs intact. Nose: Nares normal. Septum midline. Throat: Lips, mucosa, and tongue dry   Neck: Supple, symmetrical, trachea midline, no adenopathy, thyroid: no enlargement/tenderness/nodules, no carotid bruit and no JVD. Back:   Symmetric, no curvature. ROM normal. No CVA tenderness. Lungs:   Clear to auscultation bilaterally. Chest wall:  No tenderness or deformity. Heart:  Regular rate and rhythm, S1, S2 normal, no murmur, click, rub or gallop. Abdomen:   Soft, non-tender. Bowel sounds normal. No masses,  No organomegaly. Rectal deferred   Extremities: Extremities normal, atraumatic, no cyanosis or edema. No calf tenderness or cords. Pulses: 2+ and symmetric all extremities.    Skin: Skin color, texture, turgor normal. No rashes or lesions   Neurologic: CNII-XII intact. Alert and oriented X 3. Fine motor of hands and fingers normal.   equal.  Gait not tested at this time. Sensation grossly normal to touch. Gross motor of extremities normal.       Data Review:       Recent Days:  Recent Labs      07/14/18 0544 07/13/18 2041   WBC  21.9*  22.7*   HGB  5.5*  6.9*   HCT  18.2*  22.3*   PLT  57*  69*     Recent Labs      07/14/18 0544  07/13/18 2041   NA  139  137   K  3.3*  3.0*   CL  106  100   CO2  20*  21   GLU  148*  192*   BUN  14  13   CREA  1.11*  1.09*   CA  7.3*  8.1*   ALB  3.0*  3.7   SGOT  46*  57*   ALT  35  40     No results for input(s): PH, PCO2, PO2, HCO3, FIO2 in the last 72 hours. 24 Hour Results:  Recent Results (from the past 24 hour(s))   CULTURE, BLOOD    Collection Time: 07/13/18  8:41 PM   Result Value Ref Range    Special Requests: NO SPECIAL REQUESTS      Culture result: NO GROWTH AFTER 9 HOURS     LACTIC ACID    Collection Time: 07/13/18  8:41 PM   Result Value Ref Range    Lactic acid 1.8 0.4 - 2.0 MMOL/L   METABOLIC PANEL, COMPREHENSIVE    Collection Time: 07/13/18  8:41 PM   Result Value Ref Range    Sodium 137 136 - 145 mmol/L    Potassium 3.0 (L) 3.5 - 5.1 mmol/L    Chloride 100 97 - 108 mmol/L    CO2 21 21 - 32 mmol/L    Anion gap 16 (H) 5 - 15 mmol/L    Glucose 192 (H) 65 - 100 mg/dL    BUN 13 6 - 20 MG/DL    Creatinine 1.09 (H) 0.55 - 1.02 MG/DL    BUN/Creatinine ratio 12 12 - 20      GFR est AA 59 (L) >60 ml/min/1.73m2    GFR est non-AA 49 (L) >60 ml/min/1.73m2    Calcium 8.1 (L) 8.5 - 10.1 MG/DL    Bilirubin, total 1.5 (H) 0.2 - 1.0 MG/DL    ALT (SGPT) 40 12 - 78 U/L    AST (SGOT) 57 (H) 15 - 37 U/L    Alk.  phosphatase 82 45 - 117 U/L    Protein, total 7.2 6.4 - 8.2 g/dL    Albumin 3.7 3.5 - 5.0 g/dL    Globulin 3.5 2.0 - 4.0 g/dL    A-G Ratio 1.1 1.1 - 2.2     CBC WITH AUTOMATED DIFF    Collection Time: 07/13/18  8:41 PM   Result Value Ref Range    WBC 22.7 (H) 3.6 - 11.0 K/uL RBC 2.32 (L) 3.80 - 5.20 M/uL    HGB 6.9 (L) 11.5 - 16.0 g/dL    HCT 22.3 (L) 35.0 - 47.0 %    MCV 96.1 80.0 - 99.0 FL    MCH 29.7 26.0 - 34.0 PG    MCHC 30.9 30.0 - 36.5 g/dL    RDW 19.9 (H) 11.5 - 14.5 %    PLATELET 69 (L) 442 - 400 K/uL    NRBC 10.2 (H) 0  WBC    ABSOLUTE NRBC 2.31 (H) 0.00 - 0.01 K/uL    NEUTROPHILS PENDING %    LYMPHOCYTES PENDING %    MONOCYTES PENDING %    EOSINOPHILS PENDING %    BASOPHILS PENDING %    IMMATURE GRANULOCYTES PENDING %    ABS. NEUTROPHILS PENDING K/UL    ABS. LYMPHOCYTES PENDING K/UL    ABS. MONOCYTES PENDING K/UL    ABS. EOSINOPHILS PENDING K/UL    ABS. BASOPHILS PENDING K/UL    ABS. IMM. GRANS.  PENDING K/UL    DF PENDING    EKG, 12 LEAD, INITIAL    Collection Time: 07/13/18  9:12 PM   Result Value Ref Range    Ventricular Rate 103 BPM    Atrial Rate 103 BPM    P-R Interval 134 ms    QRS Duration 88 ms    Q-T Interval 330 ms    QTC Calculation (Bezet) 432 ms    Calculated P Axis 37 degrees    Calculated R Axis 5 degrees    Calculated T Axis -128 degrees    Diagnosis       Sinus tachycardia  Septal infarct , age undetermined  ST & T wave abnormality, consider inferior ischemia  ST & T wave abnormality, consider anterolateral ischemia  Abnormal ECG  No previous ECGs available     CULTURE, BLOOD    Collection Time: 07/13/18  9:36 PM   Result Value Ref Range    Special Requests: NO SPECIAL REQUESTS      Culture result: NO GROWTH AFTER 9 HOURS     URINALYSIS W/MICROSCOPIC    Collection Time: 07/13/18  9:36 PM   Result Value Ref Range    Color YELLOW/STRAW      Appearance CLEAR CLEAR      Specific gravity 1.019 1.003 - 1.030      pH (UA) 6.0 5.0 - 8.0      Protein 100 (A) NEG mg/dL    Glucose NEGATIVE  NEG mg/dL    Ketone 15 (A) NEG mg/dL    Bilirubin NEGATIVE  NEG      Blood SMALL (A) NEG      Urobilinogen 0.2 0.2 - 1.0 EU/dL    Nitrites NEGATIVE  NEG      Leukocyte Esterase NEGATIVE  NEG      WBC 0-4 0 - 4 /hpf    RBC 5-10 0 - 5 /hpf    Epithelial cells FEW FEW /lpf Bacteria NEGATIVE  NEG /hpf    Hyaline cast 0-2 0 - 5 /lpf   INFLUENZA A & B AG (RAPID TEST)    Collection Time: 07/13/18  9:36 PM   Result Value Ref Range    Influenza A Antigen NEGATIVE  NEG      Influenza B Antigen NEGATIVE  NEG     TYPE + CROSSMATCH    Collection Time: 07/14/18 12:47 AM   Result Value Ref Range    Crossmatch Expiration 07/17/2018     ABO/Rh(D) A NEGATIVE     Antibody screen POS     Antibody ID ANTI-C  ANTI-D       ANTIGEN TYPING C NEGATIVE,     Unit number [id number]     Blood component type [facility]     Unit division 00     Status of unit ISSUED     ANTIGEN/ANTIBODY INFO C NEGATIVE,     Crossmatch result Compatible     Unit number [id number]     Blood component type [facility]     Unit division 00     Status of unit ALLOCATED     ANTIGEN/ANTIBODY INFO C NEGATIVE,     Crossmatch result Compatible     Unit number [id number]     Blood component type [facility]     Unit division 00     Status of unit ALLOCATED     ANTIGEN/ANTIBODY INFO C NEGATIVE,     Crossmatch result Compatible    LACTIC ACID    Collection Time: 07/14/18  1:20 AM   Result Value Ref Range    Lactic acid 1.7 0.4 - 2.0 MMOL/L   METABOLIC PANEL, COMPREHENSIVE    Collection Time: 07/14/18  5:44 AM   Result Value Ref Range    Sodium 139 136 - 145 mmol/L    Potassium 3.3 (L) 3.5 - 5.1 mmol/L    Chloride 106 97 - 108 mmol/L    CO2 20 (L) 21 - 32 mmol/L    Anion gap 13 5 - 15 mmol/L    Glucose 148 (H) 65 - 100 mg/dL    BUN 14 6 - 20 MG/DL    Creatinine 1.11 (H) 0.55 - 1.02 MG/DL    BUN/Creatinine ratio 13 12 - 20      GFR est AA 58 (L) >60 ml/min/1.73m2    GFR est non-AA 48 (L) >60 ml/min/1.73m2    Calcium 7.3 (L) 8.5 - 10.1 MG/DL    Bilirubin, total 1.0 0.2 - 1.0 MG/DL    ALT (SGPT) 35 12 - 78 U/L    AST (SGOT) 46 (H) 15 - 37 U/L    Alk.  phosphatase 70 45 - 117 U/L    Protein, total 6.0 (L) 6.4 - 8.2 g/dL    Albumin 3.0 (L) 3.5 - 5.0 g/dL    Globulin 3.0 2.0 - 4.0 g/dL    A-G Ratio 1.0 (L) 1.1 - 2.2     CBC WITH AUTOMATED DIFF    Collection Time: 07/14/18  5:44 AM   Result Value Ref Range    WBC 21.9 (H) 3.6 - 11.0 K/uL    RBC 1.87 (L) 3.80 - 5.20 M/uL    HGB 5.5 (LL) 11.5 - 16.0 g/dL    HCT 18.2 (L) 35.0 - 47.0 %    MCV 97.3 80.0 - 99.0 FL    MCH 29.4 26.0 - 34.0 PG    MCHC 30.2 30.0 - 36.5 g/dL    RDW 20.3 (H) 11.5 - 14.5 %    PLATELET 57 (L) 708 - 400 K/uL    MPV 10.5 8.9 - 12.9 FL    NRBC 6.2 (H) 0  WBC    ABSOLUTE NRBC 1.36 (H) 0.00 - 0.01 K/uL    NEUTROPHILS 52 32 - 75 %    LYMPHOCYTES 13 12 - 49 %    MONOCYTES 30 (H) 5 - 13 %    EOSINOPHILS 0 0 - 7 %    BASOPHILS 0 0 - 1 %    BLASTS 5 (H) 0 %    IMMATURE GRANULOCYTES 0 %    ABS. NEUTROPHILS 11.4 (H) 1.8 - 8.0 K/UL    ABS. LYMPHOCYTES 2.8 0.8 - 3.5 K/UL    ABS. MONOCYTES 6.6 (H) 0.0 - 1.0 K/UL    ABS. EOSINOPHILS 0.0 0.0 - 0.4 K/UL    ABS. BASOPHILS 0.0 0.0 - 0.1 K/UL    ABS. IMM.  GRANS. 0.0 K/UL    DF MANUAL      RBC COMMENTS ANISOCYTOSIS  2+           Medications reviewed  Current Facility-Administered Medications   Medication Dose Route Frequency    cefepime (MAXIPIME) 2 g in 0.9% sodium chloride (MBP/ADV) 100 mL  2 g IntraVENous Q12H    polyethylene glycol (MIRALAX) packet 17 g  17 g Oral QHS    senna-docusate (PERICOLACE) 8.6-50 mg per tablet 1 Tab  1 Tab Oral DAILY    [START ON 7/15/2018] vancomycin (VANCOCIN) 1,000 mg in 0.9% sodium chloride (MBP/ADV) 250 mL  1,000 mg IntraVENous Q24H    pantoprazole (PROTONIX) tablet 40 mg  40 mg Oral ACB    sodium chloride (NS) flush 5-10 mL  5-10 mL IntraVENous PRN    sodium chloride (NS) flush 5-10 mL  5-10 mL IntraVENous Q8H    sodium chloride (NS) flush 5-10 mL  5-10 mL IntraVENous PRN    acetaminophen (TYLENOL) tablet 650 mg  650 mg Oral Q4H PRN    0.9% sodium chloride infusion  125 mL/hr IntraVENous CONTINUOUS    ondansetron (ZOFRAN) injection 4 mg  4 mg IntraVENous Q4H PRN    levoFLOXacin (LEVAQUIN) 750 mg in D5W IVPB  750 mg IntraVENous Q48H    0.9% sodium chloride infusion 250 mL  250 mL IntraVENous PRN       Care Plan discussed with: Patient/Family and Nurse    Total time spent with patient: 30 minutes.     Lane Golden MD

## 2018-07-14 NOTE — ED TRIAGE NOTES
Patient arrives via EMS with c/o nausea and vomiting x 24 hours. Patient received Zofran 4 mg IV in route per EMS. Patient with history of bone marrow CA. Dr. Lex Alejo at bedside.

## 2018-07-14 NOTE — OP NOTES
Operative Report        Johana Robles    MRN:  [id number]    Date of Procedure:  7/14/2018    Surgeon:  Kymberly Vee MD.     Assistant:   Lanec Escalona. Anesthesia:   General endotracheal.    Preoperative Diagnosis:   LEFT PERIRECTAL ABSCESS. Postoperative Diagnosis:    LEFT PERIRECTAL ABSCESS. Procedures:  1. Incision and drainage of left perirectal abscess. 2. Biopsy of the left perianal soft tissue. Indication:   Johana Robles is a 75 yrs white female who presents with swelling of the perianal region suspicious for a perirectal abscess with fever and generalized malaise. She has been admitted for IV antibiotics and now presents for a drainage procedure. Procedure in Detail:   The patient was seen preoperatively in the holding area. The risks, benefits, and expected outcome were discussed with the patient, and all questions were answered satisfactorily. The patient concurred with the proposed plan, giving informed consent. The patient was taken to the OR. The patient was identified as Johana Robles, and the procedure verified as Procedure(s):  INCISION AND DRAINAGE, EXCISIONAL DEBRIDEMENT OF PERIRECTAL ABSCESS. The patient was placed on the OR table in the supine position. General anesthesia was administered and tolerated well. The patient was placed in the dorsal lithotomy position. The patient's perineum was prepped with Betadine and draped in the usual sterile fashion. A Time Out was performed, and the above information was confirmed. A longitudinal incision was made directly over the most prominent portion of swelling in the left perianal region. There was a small amount of murky fluid, but no significant pocket of pus. Routine gram stain and cultures were obtained. The cavity was manually probed and did not extend up to the left labia majora, medially toward the rectum, or laterally, but did track posteriorly for approximately 2 to 3 cm.   A vaginal exam did not reveal any fluid collections along the left labia majora. A digital rectal exam revealed only formed stool without any fluctuance or mass. Two pieces of the skin and subcutaneous tissue were excised and passed off as separate specimens since the skin was ecchymotic and blistered. Hemostasis was obtained within the wound as needed with cautery. The wound was irrigated with saline. The wound was packed with 1/2\" Iodoform and a sterile dressing was applied. The patient was extubated in the room. Estimated Blood Loss:    Less than 25 ml. Specimen:     ID Type Source Tests Collected by Time Destination   1 : Skin Subcutaneous Tissue Perirectal Abscess Preservative Rectal Abscess  Elva Decker MD 7/14/2018 1406 Pathology   2 : Skin Subcutaneous Tissue Rectal Abscess #2 Preservative Rectal Abscess  Elva Decker MD 7/14/2018 1409 Pathology   1 : Perirectal abscess Wound Rectal Abscess CULTURE, ANAEROBIC, CULTURE, WOUND W Vester Rush, GRAM STAIN Elva Decker MD 7/14/2018 1404 Microbiology        Drain:   * No implants in log *    Findings:   1. Murky fluid in the left perianal soft tissue without any large pocket of pus.  2. Extension of the left perianal cavity posteriorly, but not up to the left labia majora. 3. Blistering, ecchymotic, indurated skin in the left perianal region. 4. Grossly normal rectal exam.  5. Swelling of the left labia majora with otherwise grossly normal internal exam.    Counts: All sponge, needle, and instrument counts were correct x 2. Complications:  None. Disposition:  The patient was transferred to the recovery room in stable condition, having tolerated the procedure and anesthesia well.                Signed By: Elva Decker MD     July 14, 2018        Rivka Wilkins MD

## 2018-07-14 NOTE — PROGRESS NOTES
Problem: Falls - Risk of  Goal: *Absence of Falls  Document Marcus Fall Risk and appropriate interventions in the flowsheet.    Outcome: Progressing Towards Goal  Fall Risk Interventions:  Mobility Interventions: Patient to call before getting OOB

## 2018-07-14 NOTE — PROGRESS NOTES
[facility]: MED RECONCILIATION    Comments/Recommendations:    Patient alert and oriented for medication reconciliation and knowledgeable regarding medications.  Patient and family aware that hospital does not have [place] or Exjade on formulary. Family can provide if needed after MD assessment. Patient uses orange juice to dissolve her three Exjade tablets daily.  Confirmed NKDA and updated pharmacy. Medications added:     · Jakafi  · Exjade  · MVI  · APAP    Medications removed:    · none    Medications adjusted:    · none    Information obtained from: patient    Allergies: Review of patient's allergies indicates no known allergies. Prior to Admission Medications:     Medication Documentation Review Audit       Reviewed by Jeremy Lara (Pharmacist) on 07/13/18 at 2307         Medication Sig Documenting Provider Last Dose Status Taking?      acetaminophen (TYLENOL) 325 mg tablet Take 650 mg by mouth every four (4) hours as needed for Pain. Historical Provider  Active Yes    deferasirox (EXJADE) 500 mg oral disentegrating tablet Take 1,500 mg by mouth daily. Historical Provider 7/12/2018 AM Active Yes    ruxolitinib (JAKAFI) 15 mg tab Take 15 mg by mouth two (2) times a day. Historical Provider 7/13/2018 AM Active Yes    therapeutic multivitamin (THERAGRAN) tablet Take 1 Tab by mouth daily.  Historical Provider 7/13/2018 AM Active Yes                    Thank Leonila Ambrose, PharmD   Contact: [phone]

## 2018-07-14 NOTE — PROGRESS NOTES
2340: Patient received to unit. Skin assessment performed with Diana Monreal, RN as secondary RN. Patient was complaining of soreness in her perineal area on the left side and requested that we look at the area. The patient's left labial area is red/purple colored, inflamed, indurated, hard to touch and painful to the patient. Patient reports that she noticed this this AM. She reports that she has had bladder infections/UTIs recently which she has been taking antibiotics for. She said that last night she was having difficulty making it to the bathroom and was incontinent several times, so she assumed that her skin was irritated from sitting in wet underwear. Consent for blood not completed in the ED    0020: Called Dr. Jonathan Adorno for blood consent and to report indurated area noted as a possible source of infection. MD to come to bedside to complete consent and to assess area. 0100: Blood consent signed and in the chart. MD left patient bedside, put in orders for additional abx, consult for surgery to see patient tomorrow in regards to the possible abscess, and stool softeners per patient request.     Lab called saying that ED had not sent down enough urine for culture nor had sent crossmatch sample. Specimens collected and sent down to lab. 0130: Fluid boluses finished, repeat lactic acid sent down to lab. Ordered abx currently running. 0210: Received call from blood bank that due to antibodies in patient's blood that need to be matched, acquiring blood may \"take a while\". Informed patient that she would likely not be receiving blood until the AM.    Patient reports that she has received multiple blood transfusions, the most recent in \"the middle of March\" of this year at [facility] in [address] [place] [address].     0300: at around 0245, patient HR found to be around 114, patient shaking, febrile up to 102.7, nauseas with small amount of vomiting. By 0300, HR up to 130s, temp 103.  Tylenol administered, zofran administered. Parmjit Isaac HR irregular, spiked momentarily to 150s, to 170, before returning to sustain in the 130s, 140s. When spiked, became irregular. Now sinus tachy. ST depression, but this does not seem new from earlier rhythm. Please see flowsheets for HR trend, strips signed and placed in chart. 0315: Placed cold rag on patient forehead, turned down temperature in room, put ice packs under bilateral axilla, removed socks and blankets except for thin sheet. 0400: Changing cold rags on forehead regularly. Patient had one more episode of emesis but currently denies nausea. HR has returned to around 100.      0544: Placed new IV while drawing blood. Patient temperature greatly improved. Patient complaining of back pain, heartburn. Helped patient to reposition, not yet time for more acetaminophen. Obtained order for protonix. Lab called, PRBCs ready in blood bank. 0630: Patient Hgb critical at 5.5. Dr. Johnny Osullivan, admitting physician, notified, received order for 2nd unit PRBCs.     0655: Started 1st unit of blood. 0720: Bedside and Verbal shift change report given to Robyn Greenwood, RN (oncoming nurse) by Aicha Le RN (offgoing nurse). Report included the following information SBAR, Kardex, Procedure Summary, Intake/Output, MAR, Accordion, Recent Results and Cardiac Rhythm NSRMarisabel.

## 2018-07-14 NOTE — PROGRESS NOTES
2328: TRANSFER - IN REPORT:    Verbal report received from [address] [facility], ED RN(name) on Audrey Jordan  being received from ED(unit) for routine progression of care      Report consisted of patients Situation, Background, Assessment and   Recommendations(SBAR). Information from the following report(s) SBAR, Kardex, Procedure Summary, Intake/Output, MAR, Accordion and Cardiac Rhythm NSR, Stach was reviewed with the receiving nurse. Opportunity for questions and clarification was provided. Assessment completed upon patients arrival to unit and care assumed.

## 2018-07-14 NOTE — PROGRESS NOTES
Client on unit. Bedside report provided from PACU Nurse. Vitals stable. No distress. Alert and oriented times four.

## 2018-07-14 NOTE — CONSULTS
[facility] [place] [facility] [place]  [address] [facility], [address]  [facility]: [phone]  F: [phone]      Reason for Visit:   Torie Campbell is a 75 y.o. female who is seen in consultation at the request of Dr. Veronika Louis for evaluation of Myelofibrosis. History of Present Illness:   Patient is a 75 y. o. with PMF who is admitted on 7/14/18 with c/o weakness, N/V and intermittent fevers x 5 days. She has a known h/o UTIs. Noted to have anemia, thrombocytopenia and leucocytosis on admission. CXR and UA unremarkable. She was started on Levaquin and Vancomycin and underwent I & D for a perirectal abscess on 7/14/18. She states that she underwent a BMT in 2013 for MF but relapsed soon after. Has since been on Jakafi and follows with Hematology at [facility]. She has also been on Exjade for transfusion iron overload. Does not think she has an enlarged spleen. She relocated from [facility] last year but was in the process of moving back later in this week. In the last 3-4 days she noted weakness, confusion, intermittent fevers and urinary incontinence. 1 Day prior to presentation she thought she felt a swelling in her rectal area. Hence she presented to the ER when she was found to have a temp of 102 F    Since the I and D she has had a Tmax of 100F. Has better alertness, still has pain in the perirectal area, has no chills or bleeding. Notes some nausea x 1 day. No CP, SOB, Cough, dysuria. Has not had a BM since 3 days but has no abdominal discomfort      Past Medical History:   Diagnosis Date    Myelofibrosis ([address]       Past Surgical History:   Procedure Laterality Date    HX BONE MARROW TRANSPLANT      HX VASCULAR ACCESS      right portacath      Social History   Substance Use Topics    Smoking status: Never Smoker    Smokeless tobacco: Never Used    Alcohol use Yes      Comment: seldom      Family History   Problem Relation Age of Onset    Family history unknown:  Yes Current Facility-Administered Medications   Medication Dose Route Frequency    cefepime (MAXIPIME) 2 g in 0.9% sodium chloride (MBP/ADV) 100 mL  2 g IntraVENous Q12H    polyethylene glycol (MIRALAX) packet 17 g  17 g Oral QHS    senna-docusate (PERICOLACE) 8.6-50 mg per tablet 1 Tab  1 Tab Oral DAILY    [START ON 7/15/2018] vancomycin (VANCOCIN) 1,000 mg in 0.9% sodium chloride (MBP/ADV) 250 mL  1,000 mg IntraVENous Q24H    pantoprazole (PROTONIX) tablet 40 mg  40 mg Oral ACB    sodium chloride (NS) flush 5-10 mL  5-10 mL IntraVENous PRN    sodium chloride (NS) flush 5-10 mL  5-10 mL IntraVENous Q8H    sodium chloride (NS) flush 5-10 mL  5-10 mL IntraVENous PRN    acetaminophen (TYLENOL) tablet 650 mg  650 mg Oral Q4H PRN    0.9% sodium chloride infusion  125 mL/hr IntraVENous CONTINUOUS    ondansetron (ZOFRAN) injection 4 mg  4 mg IntraVENous Q4H PRN    levoFLOXacin (LEVAQUIN) 750 mg in D5W IVPB  750 mg IntraVENous Q48H    0.9% sodium chloride infusion 250 mL  250 mL IntraVENous PRN      No Known Allergies     Review of Systems: A complete review of systems was obtained, negative except as described above. Physical Exam:     Visit Vitals    BP (P) 102/45 (BP 1 Location: Left arm, BP Patient Position: Lying right side)    Pulse (P) 89    Temp (P) 99.4 °F (37.4 °C)    Resp (P) 22    Ht 5' 6\" (1.676 m)    Wt 150 lb 3.2 oz (68.1 kg)    SpO2 98%    BMI 24.24 kg/m2     ECOG PS: 2  General: Nauseous but otherwise NAD  Eyes: PERRLA, anicteric sclerae  HENT: Atraumatic, OP clear  Neck: Supple  Lymphatic: No cervical, supraclavicular, or inguinal adenopathy  Respiratory: CTAB, normal respiratory effort  CV: Normal rate, regular rhythm, no murmurs, no peripheral edema  GI: Soft, nontender, nondistended, no masses, no hepatomegaly, no splenomegaly  MS:  Digits without clubbing or cyanosis. Skin: No rashes, ecchymoses, or petechiae. Normal temperature, turgor, and texture.   Psych: Alert, oriented, appropriate affect, normal judgment/insight  External genitalia Erythematous vulva. Packing noted    Results:     Lab Results   Component Value Date/Time    WBC 21.9 (H) 07/14/2018 05:44 AM    HGB 5.5 (LL) 07/14/2018 05:44 AM    HCT 18.2 (L) 07/14/2018 05:44 AM    PLATELET 57 (L) 72/69/7275 05:44 AM    MCV 97.3 07/14/2018 05:44 AM    ABS. NEUTROPHILS 11.4 (H) 07/14/2018 05:44 AM     Lab Results   Component Value Date/Time    Sodium 139 07/14/2018 05:44 AM    Potassium 3.3 (L) 07/14/2018 05:44 AM    Chloride 106 07/14/2018 05:44 AM    CO2 20 (L) 07/14/2018 05:44 AM    Glucose 148 (H) 07/14/2018 05:44 AM    BUN 14 07/14/2018 05:44 AM    Creatinine 1.11 (H) 07/14/2018 05:44 AM    GFR est AA 58 (L) 07/14/2018 05:44 AM    GFR est non-AA 48 (L) 07/14/2018 05:44 AM    Calcium 7.3 (L) 07/14/2018 05:44 AM     Lab Results   Component Value Date/Time    Bilirubin, total 1.0 07/14/2018 05:44 AM    ALT (SGPT) 35 07/14/2018 05:44 AM    AST (SGOT) 46 (H) 07/14/2018 05:44 AM    Alk. phosphatase 70 07/14/2018 05:44 AM    Protein, total 6.0 (L) 07/14/2018 05:44 AM    Albumin 3.0 (L) 07/14/2018 05:44 AM    Globulin 3.0 07/14/2018 05:44 AM     Differential showed predominant neutrophilia and 5% blasts    Records reviewed and summarized above. Pathology report(s) reviewed above. Radiology report(s) reviewed above.       Assessment:   1) Fever and darby rectal abscess    S/p I & D, fever curve improved on Vancomycin and Levaquin  CT pelvis today per surgery    2) Myelofibrosis  S/p BMT in 2013, relapsed and since on Jakafi at 15 mg BID    HOLD JAKAFI with known systemic infection  JAKAFI to be resumed after resolution of active infection  Needs a cbc tomorrow to aid in dose adjustments given thrombocytopenia      3) Thrombocytopenia  Secondary to MF, Jakafi and acute infection  Monitor    4) Anemia    Hb has improved from 5.5 to 8.7 g/dl post transfusion  Minimize transfusions and consider only if Hb < 7 g/dl     5) Leucocytosis  Secondary to MF and acute infection  Blasts 5%- can be a result of acute infection. < 10% blasts can be seen with MF and does not indicate leukemic transformation    6) Iron overload    Hold Exjade  Plan:     · HOLD Jakafi and Exjade while she recovers from an active systemic infection  · Transfuse if Hb < 7 g/dl or platelets < 27V  · Cbc tomorrow am    She will follow at [facility] on discharge    I appreciate the opportunity to participate in Ms. Mayen [facility].     Signed By: Dinesh Pretty MD

## 2018-07-14 NOTE — PROGRESS NOTES
Patient off the floor at the time of visit  Spoke to RN      - Transfuse to keep Hb >= 7 g/dl  - Transfuse to keep platelets > 08C  - Hold Jakafi  - Hold Exjade  - Management of active infection per primary team      Will see tomorrow

## 2018-07-14 NOTE — PROGRESS NOTES
[facility] Dosing Services: Antimicrobial Stewardship Progress Note    Consult for antibiotic dosing of Vancomycin and Cefepime by Dr. Bijan Christina  Pharmacist reviewed antibiotic appropriateness for 75year old , female  for indication of fever, sepsis, immunocompromised  Day of Therapy 1    Plan:  Vancomycin therapy:  Start Vancomycin therapy, with loading dose of 1.75 grams  Follow with maintenance dose of 1 gram daily  Dose calculated to approximate a therapeutic trough of 15-20 mcg/mL. Last trough level / Plan for level: before 3rd dose  Pharmacy to follow daily and will make changes to dose and/or frequency based on clinical status. Non-Kinetic Antimicrobial Dosing: Cefepime 2 grams every 12 hours for CrCl of 40-45     Other Antimicrobial     Levaquin 750 mg IV every 48 hours   Serum Creatinine     Lab Results   Component Value Date/Time    Creatinine 1.09 (H) 07/13/2018 08:41 PM       Creatinine Clearance Estimated Creatinine Clearance: 42.4 mL/min (based on Cr of 1.09).      WBC   Lab Results   Component Value Date/Time    WBC 22.7 (H) 07/13/2018 08:41 PM       H/H   Lab Results   Component Value Date/Time    HGB 6.9 (L) 07/13/2018 08:41 PM        Platelets   Lab Results   Component Value Date/Time    PLATELET 69 (L) 19/17/7927 08:41 PM          Pharmacist: Sherlyn Beauchamp,[address] information:

## 2018-07-14 NOTE — ANESTHESIA POSTPROCEDURE EVALUATION
Post-Anesthesia Evaluation and Assessment    Patient: Lilia Quezada MRN: [id number]  SSN: [id number]    YOB: 1945  Age: 75 y.o. Sex: female       Cardiovascular Function/Vital Signs  Visit Vitals    /53    Pulse 80    Temp 36.8 °C (98.2 °F)    Resp 18    Ht 5' 6\" (1.676 m)    Wt 68.1 kg (150 lb 3.2 oz)    SpO2 96%    BMI 24.24 kg/m2       Patient is status post general anesthesia for Procedure(s):  INCISION AND DRAINAGE, EXCISIONAL DEBRIDEMENT OF PERIRECTAL ABSCESS. Nausea/Vomiting: None    Postoperative hydration reviewed and adequate. Pain:  Pain Scale 1: Numeric (0 - 10) (07/14/18 1442)  Pain Intensity 1: 0 (07/14/18 1442)   Managed    Neurological Status:   Neuro (WDL): Within Defined Limits (07/14/18 1442)  Neuro  LUE Motor Response: Purposeful (07/14/18 1442)  LLE Motor Response: Purposeful (07/14/18 1442)  RUE Motor Response: Purposeful (07/14/18 1442)  RLE Motor Response: Purposeful (07/14/18 1442)   At baseline    Mental Status and Level of Consciousness: Arousable    Pulmonary Status:   O2 Device: Nasal cannula (07/14/18 1442)   Adequate oxygenation and airway patent    Complications related to anesthesia: None    Post-anesthesia assessment completed.  No concerns    Signed By: Eric Valle MD     July 14, 2018

## 2018-07-14 NOTE — PERIOP NOTES
TRANSFER - OUT REPORT:    Verbal report given to teresa hackett(name) on [place]  being transferred to [facility](unit) for routine post - op       Report consisted of patients Situation, Background, Assessment and   Recommendations(SBAR). Information from the following report(s) SBAR, Procedure Summary, Intake/Output and MAR was reviewed with the receiving nurse. Lines:   Peripheral IV 07/13/18 Left Antecubital (Active)   Site Assessment Clean, dry, & intact 7/14/2018  2:22 PM   Phlebitis Assessment 0 7/14/2018  2:22 PM   Infiltration Assessment 0 7/14/2018  2:22 PM   Dressing Status Clean, dry, & intact 7/14/2018  2:22 PM   Dressing Type Tape;Transparent 7/14/2018  2:22 PM   Hub Color/Line Status Pink 7/14/2018  2:22 PM   Alcohol Cap Used Yes 7/14/2018  6:56 AM       Peripheral IV 07/14/18 Right Forearm (Active)   Site Assessment Clean, dry, & intact 7/14/2018  2:22 PM   Phlebitis Assessment 0 7/14/2018  2:22 PM   Infiltration Assessment 0 7/14/2018  2:22 PM   Dressing Status Clean, dry, & intact 7/14/2018  2:22 PM   Dressing Type Tape;Transparent 7/14/2018  2:22 PM   Hub Color/Line Status Pink 7/14/2018  2:22 PM   Alcohol Cap Used Yes 7/14/2018  6:56 AM        Opportunity for questions and clarification was provided.       Patient transported with:   O2 @ 2 liters  Registered Nurse

## 2018-07-14 NOTE — CDMP QUERY
There is noted documentation of SIRS on this record. Could this be further clarified as    =>Likely Sepsis POA in the setting of darby-rectal abscess requiring fluid resuscitation/vancomycin  =>Other Explanation of clinical findings  =>Clinically Undetermined (no explanation for clinical findings)    The medical record reflects the following clinical findings, treatment, and risk factors:    Risk Factors: darby-rectal abscess  Clinical Indicators: fever, tachycardia, WBC 22.7  Treatment: 200 cc IV bolus, vancomycin, maxipime, levaquin, blood cultures      Please clarify and document your clinical opinion in the progress notes and discharge summary including the definitive and/or presumptive diagnosis, (suspected or probable), related to the above clinical findings.  Please include clinical findings supporting your diagnosis    Thank you,         [facility]

## 2018-07-14 NOTE — H&P
[address] 
[phone] Admission History and Physical 
 
 
NAME:              Fawad Koroma :   1945 MRN:  [id number] PCP:  Dannielle Bellamy MD  
 
Date:     2018 Chief  Complaint: Fever History Of Presenting Illness: Ms. Taniya Moseley is a 75 y.o. female who is being admitted for Fever. Ms. Taniya Moseley presented to our Emergency Department today complaining of intermittent fever and chills associated with generalized weakness. She has also bee having nausea and vomiting but denies any headaches, flu like or respiratory symptoms, No abdominal discomfort or diarrhea. She has not has any urinary symptoms. No overt focal symptoms. She does have a Hx of myelofibrosis and follows up at [facility]. Work up thus far has been unremarkable. In light of her immunosuppression, she will be admitted for further management. No Known Allergies Prior to Admission medications Medication Sig Start Date End Date Taking? Authorizing Provider  
ruxolitinib (JAKAFI) 15 mg tab Take 15 mg by mouth two (2) times a day. Yes Historical Provider  
deferasirox (EXJADE) 500 mg oral disentegrating tablet Take 1,500 mg by mouth daily. Yes Historical Provider  
therapeutic multivitamin (THERAGRAN) tablet Take 1 Tab by mouth daily. Yes Historical Provider  
acetaminophen (TYLENOL) 325 mg tablet Take 650 mg by mouth every four (4) hours as needed for Pain. Yes Historical Provider Past Medical History:  
Diagnosis Date  Myelofibrosis ([facility] [address] Past Surgical History:  
Procedure Laterality Date  HX BONE MARROW TRANSPLANT  HX VASCULAR ACCESS    
 right portacath Social History Substance Use Topics  Smoking status: Never Smoker  Smokeless tobacco: Never Used  Alcohol use Yes Comment: seldom Family History Problem Relation Age of Onset  Family history unknown: Yes Review of Systems: 
 
Constitutional ROS: + fever, + chills, + rigors but no night sweats Respiratory ROS: no cough, sputum, hemoptysis, dyspnea or pleuritic pain. Cardiovascular ROS: no chest pain, palpitations, orthopnea, PND or syncope Endocrine ROS: no polydispsia, polyuria, heat or cold intolerance or major weight change. Gastrointestinal ROS: no dysphagia, abdominal pain, nausea, vomiting or diarrhea Genito-Urinary ROS: no dysuria, frequency, hematuria, retention or flank pain Musculoskeletal ROS: no joint pain, swelling or muscular tenderness Neurological ROS: no headache, confusion, focal weakness or any other neurological symptoms Psychiatric ROS: no depression, anxiety, mood swings Dermatological ROS: no rash, pruritis, or urticaria Heme-Lymph ROS: no swollen glands, bleeding Examination: 
 
Constitutional:   
Visit Vitals  /54 (BP 1 Location: Right arm, BP Patient Position: At rest)  Pulse 87  Temp 99.5 °F (37.5 °C)  Resp 20  
 Ht 5' 6\" (1.676 m)  Wt 66.7 kg (147 lb)  SpO2 97%  BMI 23.73 kg/m2 General:  Weak and ill looking patient in no acute distress Eyes: icteric and pale conjunctivae, PERRLA with no discharge. Normal eye movements Ear, Nose, Mouth & Throat: No ottorrhea, rhinorrhea, non tender sinuses, dry mucous membranes Respiratory:  No accessory muscle use, clear breath sounds without crackles or wheezes Cardiovascular:  No JVD or murmurs, regular and normal S1, S2 without thrills, bruits or peripheral edema. Capillary refil+, good distal pulses GI & :  Soft abdomen, non-tender, non-distended, normoactive bowel sounds with no palpable organomegaly Heme:  No cervical or axillary adenopathy. Musculoskeletal:  No cyanosis, clubbing, atrophy or deformities Skin:  No rashes, bruising or ulcers.  Swelling and indurated left gluteal area concerning for abscess Neurological: Awake and alert, speech is clear, CNs 2-12 are grossly intact and otherwise non focal 
Psychiatric:  Has a good insight and is oriented x 3 
________________________________________________________________________ Data Review: 
 
Labs: 
 
Recent Labs  
   07/13/18 2041 WBC  22.7* HGB  6.9*  
HCT  22.3*  
PLT  69* Recent Labs  
   07/13/18 2041 NA  137  
K  3.0*  
CL  100 CO2  21 GLU  192* BUN  13  
CREA  1.09* CA  8.1* ALB  3.7 SGOT  57* ALT  40 No components found for: [place] No results for input(s): PH, PCO2, PO2, HCO3, FIO2 in the last 72 hours. No results for input(s): INR in the last 72 hours. No lab exists for component: INREXT Radiological Studies:   
 
Chest X-ray - normal.  
 
I have also reviewed available old medical records. Assessment & Impression: Ms. Yony Watson is a 75 y.o. female being evaluated for:  
 
Principal Problem: 
  Fever (7/13/2018) Active Problems: Myelofibrosis ([address] (7/14/2018) Thrombocytopenia ([address] (7/14/2018) Hypokalemia (7/14/2018) Leukocytosis (7/14/2018) Nausea and vomiting (7/14/2018) SIRS (systemic inflammatory response syndrome) ([address] (7/14/2018) Darby-rectal abscess (7/14/2018) Plan of management: 
 
Fever (7/13/2018)/  Leukocytosis (7/14/2018)/  SIRS (systemic inflammatory response syndrome) ([address] (7/14/2018): in the setting of immunosuppression. Likely etiology maybe left darby-rectal abscess. Admit to hospital. Blood cultures. Start broad spectrum IV antibiotics. Follow cultures ? Darby-rectal abscess (7/14/2018): left side. Likely source of fever. IV antibiotics as above. Consult general surgery Myelofibrosis ([address] (7/14/2018)/ Thrombocytopenia ([facility] [address] (7/14/2018): follows up at [facility]. Consult Hematology while here. Hgb 6.9. Transfuse 1 unit PRBC Hypokalemia (7/14/2018): likely from vomiting. Replete and follow K+ Nausea and vomiting (7/14/2018): likely from infection as above. Hydrate. IV anti-emetics. Monitor Code Status:  Full Surrogate decision maker: Family Risk of deterioration: high Total time spent for the care of the patient: 79 Minutes Care Plan discussed with: Patient, Family, Nursing Staff and ED physician Discussed:  Code Status, Care Plan and D/C Planning Prophylaxis:  none indicated Probable Disposition:  Home w/Family 
        
___________________________________________________ Attending Physician: Aida Hernandez MD

## 2018-07-14 NOTE — ED PROVIDER NOTES
HPI Comments: Patient is a 75year old female with a past medical history significant for myelofibrosis who presents to the ED via EMS with a chief complaint of intermittent fever, n/v onset yesterday. Pt states that she has had intermittent episodes of n/v, and a 'low grade fever' since yesterday. She also reports that she took Tylenol at 1700 today but it didn't work so she took ibuprofen (eventhough her ontologist has told her not to) and then she vomited. Pt reports that she has had similar episodes of these sx in the past and will often have a UTI. EMS report they gave 4mg of Zofran en route to ED. Pt additionally c/o diffuse myalgias (currently rates pain at 5/10 in severity). She denies  Cough, chest pain, diarrhea, shortness of breath or any other acute sx. Of note, pt states that she takes 2 medications: Exjade (Deferasirox) and Jakafi (Ruxolitinib). There are no additional medical complaints at this time. Signed by: loulou Beard for Laith Abdul on July 13th, 2018. The history is provided by the patient and the EMS personnel. No  was used. No past medical history on file. No past surgical history on file. No family history on file. Social History     Social History    Marital status: N/A     Spouse name: N/A    Number of children: N/A    Years of education: N/A     Occupational History    Not on file. Social History Main Topics    Smoking status: Not on file    Smokeless tobacco: Not on file    Alcohol use Not on file    Drug use: Not on file    Sexual activity: Not on file     Other Topics Concern    Not on file     Social History Narrative         ALLERGIES: Review of patient's allergies indicates no known allergies. Review of Systems   Constitutional: Positive for fatigue. Respiratory: Negative for shortness of breath. Cardiovascular: Negative for chest pain and palpitations.    Gastrointestinal: Positive for nausea. Musculoskeletal: Positive for myalgias. Negative for neck pain. Neurological: Positive for weakness. All other systems reviewed and are negative. Vitals:    07/13/18 2014   BP: 154/62   Pulse: (!) 111   Resp: 22   Temp: (!) 103.1 °F (39.5 °C)   SpO2: 96%   Weight: 66.7 kg (147 lb)   Height: 5' 6\" (1.676 m)            Physical Exam   Constitutional: She appears well-developed and well-nourished. No distress. HENT:   Head: Normocephalic and atraumatic. Mouth/Throat: Oropharynx is clear and moist.   Eyes: Conjunctivae and EOM are normal.   Neck: Normal range of motion and phonation normal.   Cardiovascular: Intact distal pulses. Tachycardia present. Pulmonary/Chest: Effort normal. No respiratory distress. She has no wheezes. Abdominal: Soft. She exhibits no distension. There is no tenderness. There is no rebound and no guarding. Musculoskeletal: Normal range of motion. She exhibits no tenderness. Neurological: She is alert. She is not disoriented. She exhibits normal muscle tone. Skin: Skin is warm. She is diaphoretic. There is pallor. Nursing note and vitals reviewed. [facility]      ED Course     10:08 PM  Received call from pathologist, has been notified by lab that patient has 8-15% blasts on differential, may be contacted at [phone] if questions or need prelim release tonight. 10:41 PM  Discussed with Oncology, Dr. Griffin De Oliveira,  Would not be surprised to see blasts in a patient with history of known myelofibrosis who is fighting an infection. Does not require transfer, [facility] 10:50 PM  Discussed with DR. Radha Alva, will admit.   Requests 1UPRBC ordered for transfusion, hbg 6.9  Procedures

## 2018-07-14 NOTE — PROGRESS NOTES
[facility] Dosing Services: 7/13/18    Pharmacist Renal Dosing Progress Note for levofloxacin  Physician Dr. Deedee Childress    The following medication: levofloxacin was automatically dose-adjusted per [facility] P&T Committee Protocol, with respect to renal function. Consult provided for this   75 y.o. , female , for the indication of sepsis. Pt Weight:   Wt Readings from Last 1 Encounters:   07/13/18 66.7 kg (147 lb)         Previous Regimen Levofloxacin 750mg IV every 24 hours   Serum Creatinine Lab Results   Component Value Date/Time    Creatinine 1.09 (H) 07/13/2018 08:41 PM       Creatinine Clearance Estimated Creatinine Clearance: 42.4 mL/min (based on Cr of 1.09). BUN Lab Results   Component Value Date/Time    BUN 13 07/13/2018 08:41 PM       Dosage changed to:  Levofloxacin 750mg IV every 48 hours for CrCl <50mL/min    Pharmacy to continue to monitor patient daily. Will make dosage adjustments based upon changing renal function. Signed Jeremy Lara.  Contact information:  [phone]

## 2018-07-14 NOTE — ROUTINE PROCESS
TRANSFER - OUT REPORT:    Verbal report given to Danial Moody RN(name) on [facility]  being transferred to [facility](unit) for routine progression of care       Report consisted of patients Situation, Background, Assessment and   Recommendations(SBAR). Information from the following report(s) ED Summary was reviewed with the receiving nurse. Lines:   Peripheral IV 07/13/18 Left Antecubital (Active)   Site Assessment Clean, dry, & intact 7/13/2018  8:22 PM   Phlebitis Assessment 0 7/13/2018  8:22 PM   Infiltration Assessment 0 7/13/2018  8:22 PM   Dressing Status Clean, dry, & intact 7/13/2018  8:22 PM   Dressing Type Transparent 7/13/2018  8:22 PM   Hub Color/Line Status Pink;Patent; Infusing 7/13/2018  8:22 PM        Opportunity for questions and clarification was provided.       Patient transported with:   Aniika

## 2018-07-14 NOTE — CONSULTS
Surgery Consult    Subjective:      Noelle Patel is a 75 y. o. white female with a h/o myelofibrosis who presents with generalized malaise, n/v, and fever for the past 5 days. Two days ago, Mrs. Ramos noticed swelling of her left labia. She denies any pain or drainage. She cannot tell if there is any redness. She has had multiple UTI's this year, but denies any h/o skin, vaginal, or perirectal abscesses. She was admitted to the medical service last night and placed on IV antibiotics.     Past Medical History:   Diagnosis Date    Myelofibrosis [facility])      Past Surgical History:   Procedure Laterality Date    HX BONE MARROW TRANSPLANT      HX VASCULAR ACCESS      right portacath      Family History   Problem Relation Age of Onset    Family history unknown: Yes     Social History     Social History    Marital status:      Spouse name: N/A    Number of children: N/A    Years of education: N/A     Social History Main Topics    Smoking status: Never Smoker    Smokeless tobacco: Never Used    Alcohol use Yes      Comment: seldom    Drug use: No    Sexual activity: Not Asked     Other Topics Concern    None     Social History Narrative      Current Facility-Administered Medications   Medication Dose Route Frequency Provider Last Rate Last Dose    cefepime (MAXIPIME) 2 g in 0.9% sodium chloride (MBP/ADV) 100 mL  2 g IntraVENous Q12H Flaquita Rodriguez  mL/hr at 07/14/18 0804 2 g at 07/14/18 0804    polyethylene glycol (MIRALAX) packet 17 g  17 g Oral QHS Flaquita Rodriguez MD   17 g at 07/14/18 0111    senna-docusate (PERICOLACE) 8.6-50 mg per tablet 1 Tab  1 Tab Oral DAILY Flaquita Rodriguez MD   1 Tab at 07/14/18 0111    [START ON 7/15/2018] vancomycin (VANCOCIN) 1,000 mg in 0.9% sodium chloride (MBP/ADV) 250 mL  1,000 mg IntraVENous Q24H Flaquita Rodriguez MD        pantoprazole (PROTONIX) tablet 40 mg  40 mg Oral ACB Flaquita Rodriguez MD   40 mg at 07/14/18 0653    sodium chloride (NS) flush 5-10 mL  5-10 mL IntraVENous PRN Chelsy Ellis MD        sodium chloride (NS) flush 5-10 mL  5-10 mL IntraVENous Q8H Placido Lesch, MD   10 mL at 07/14/18 0805    sodium chloride (NS) flush 5-10 mL  5-10 mL IntraVENous PRN Placido Lesch, MD        acetaminophen (TYLENOL) tablet 650 mg  650 mg Oral Q4H PRN Placido Lesch, MD   650 mg at 07/14/18 0650    0.9% sodium chloride infusion  125 mL/hr IntraVENous CONTINUOUS Placido Lesch,  mL/hr at 07/14/18 0113 125 mL/hr at 07/14/18 0113    ondansetron (ZOFRAN) injection 4 mg  4 mg IntraVENous Q4H PRN Placido Lesch, MD   4 mg at 07/14/18 0653    levoFLOXacin (LEVAQUIN) 750 mg in D5W IVPB  750 mg IntraVENous Q48H Placido Lesch,  mL/hr at 07/14/18 0113 750 mg at 07/14/18 0113    0.9% sodium chloride infusion 250 mL  250 mL IntraVENous PRN Chelsy Ellis MD            No Known Allergies    Review of Systems:  A comprehensive review of systems was negative except for that written in the History of Present Illness.     Objective:      Patient Vitals for the past 8 hrs:   BP Temp Pulse Resp SpO2 Weight   07/14/18 0855 97/49 98.3 °F (36.8 °C) 87 22 98 % -   07/14/18 0756 100/53 99.4 °F (37.4 °C) 95 24 98 % -   07/14/18 0726 99/53 99.7 °F (37.6 °C) 99 24 98 % -   07/14/18 0711 111/49 99.7 °F (37.6 °C) 98 22 96 % -   07/14/18 0700 - - 95 - - -   07/14/18 0648 103/51 99.7 °F (37.6 °C) 96 24 - -   07/14/18 0600 101/67 99.7 °F (37.6 °C) 96 25 - -   07/14/18 0559 - - - - - 150 lb 3.2 oz (68.1 kg)   07/14/18 0530 108/54 - 99 - - -   07/14/18 0520 - - 95 - - -   07/14/18 0510 - - 97 - - -   07/14/18 0500 114/50 (!) 100.5 °F (38.1 °C) (!) 101 28 - -   07/14/18 0450 - - 99 - - -   07/14/18 0440 - - 98 - - -   07/14/18 0430 106/51 - 99 - - -   07/14/18 0420 - - 100 - - -   07/14/18 0410 - - (!) 102 - - -   07/14/18 0400 106/51 (!) 101 °F (38.3 °C) (!) 105 29 96 % -   07/14/18 0350 - - (!) 101 - - -   07/14/18 0340 - - (!) 105 - - -   07/14/18 3372 112/51 (!) 102.7 °F (39.3 °C) (!) 104 30 95 % -   18 0338 - - (!) 103 - - -   18 0336 - - (!) 104 - - -   18 0335 - - (!) 105 - - -   18 0334 - - 100 - - -   18 0333 - - (!) 141 - - -   18 0332 - - (!) 146 - - -   18 0330 - - (!) 136 - - -   186 - - (!) 137 - - -   18 - - (!) 138 - - -   18 0324 - - (!) 142 - - -   18 0320 - - (!) 134 - - -   188 - - (!) 138 - - -   186 - - (!) 144 - - -   18 0315 - - (!) 141 - - -   18 0314 - - (!) 141 - - -   18 0313 - - (!) 142 - - -   18 - - (!) 137 - - -   18 0311 - - (!) 136 - - -   18 0310 - - (!) 137 - - -   189 - - (!) 152 - - -   18 - - (!) 171 - - -   187 - - (!) 119 - - -   18 0305 - - (!) 106 - - -   18 0304 - - (!) 102 - - -   183 - - (!) 107 - - -   18 0302 - - (!) 107 - - -   18 0301 - - (!) 109 - - -   18 0300 - - (!) 108 - - -   18 0259 - - (!) 103 - - -   18 - - (!) 133 - - -   18 0257 - - (!) 108 - - -   18 0256 140/61 (!) 103 °F (39.4 °C) (!) 104 26 95 % -   18 0255 - - (!) 103 - - -   18 0254 - - (!) 105 - - -   18 0253 - - (!) 104 - - -   18 0252 - - (!) 108 - - -   18 0250 - - (!) 103 - - -   18 0245 - (!) 102.7 °F (39.3 °C) (!) 105 22 96 % -   18 0240 - - (!) 114 - - -   18 0236 - - (!) 115 - - -   18 0235 - - (!) 110 - - -   18 0234 - - (!) 114 - - -   18 0230 - - (!) 115 - - -   18 0225 - - (!) 102 - - -   18 0210 - - (!) 112 - - -   18 0200 - - (!) 103 - - -   18 0155 - - (!) 104 - - -   18 0150 - - (!) 106 - - -   18 0135 - - 99 - - -   18 0130 - 99.4 °F (37.4 °C) - - - -       Temp (24hrs), Av.5 °F (38.1 °C), Min:98.3 °F (36.8 °C), Max:103.1 °F (39.5 °C)      Physical Exam:  GENERAL: alert, cooperative, no distress, appears stated age, EYE: negative findings: anicteric sclera, LYMPHATIC: Cervical, supraclavicular nodes normal. , THROAT & NECK: normal, LUNG: clear to auscultation bilaterally, HEART: regular rate and rhythm, ABDOMEN: Soft, NT, ND., EXTREMITIES:  no edema, SKIN: Along the left perianal region, there is swelling, erythema, ecchymosis, and fluctuance. The swelling extends to the left labia. There is no tenderness, drainage, or crepitus. There is a small blister in this area, but no necrosis. , NEUROLOGIC: negative, PSYCHIATRIC: non focal    Assessment:     Hospital Problems  Date Reviewed: 7/14/2018          Codes Class Noted POA    [facility] ICD-10-CM: M64.64  ICD-9-CM: 289.83  7/14/2018 Yes        Thrombocytopenia ([facility] ICD-10-CM: D69.6  ICD-9-CM: 287.5  7/14/2018 Yes        Hypokalemia ICD-10-CM: E87.6  ICD-9-CM: 276.8  7/14/2018 Yes        Leukocytosis ICD-10-CM: Q36.481  ICD-9-CM: 288.60  7/14/2018 Yes        Nausea and vomiting ICD-10-CM: R11.2  ICD-9-CM: 787.01  7/14/2018 Yes        SIRS (systemic inflammatory response syndrome) (HCC) ICD-10-CM: R65.10  ICD-9-CM: 995.90  7/14/2018 Yes        Rhona-rectal abscess ICD-10-CM: K61.1  ICD-9-CM: 706  7/14/2018 Yes        * (Principal)Fever ICD-10-CM: R50.9  ICD-9-CM: 780.60  7/13/2018 Yes              Plan:     Mrs. Yony Watson will be taken to the OR today for an I&D of this perianal abscess. I discussed the risks of the procedure including bleeding, persistent infection, delayed wound healing, and reaction to the prep or local and general anesthetic. She understands the risks; any and all questions were answered to her satisfaction. She will need to continue on IV antibiotics. I appreciate the medical service allowing me to participate in Mrs. Ramos's care.     Signed By: Jamila Morataya MD     July 14, 2018

## 2018-07-14 NOTE — PROGRESS NOTES
Called and informed on-call [facility]) of increasing temperature after one hour of PRBC transfusion. This is second unit of PRBC to be transfused since approximatley 0630 this morning. Dr. Jodee Mathew is aware that client arrived to this facility with symptoms of fever, nausea, and vomiting which have been under control with oral and IV medication. Tylenol and Zofran where provided at 1130 at client request for headache and nausea. Report was provided to the OR team (Mitali Mcarthur) prior to client departure to the OR. Ed was updated regarding increase in temperature and Dr. Ibarra Card feedback to continue with the current tranfussion of PRBC.

## 2018-07-15 ENCOUNTER — APPOINTMENT (OUTPATIENT)
Dept: CT IMAGING | Age: 73
DRG: 854 | End: 2018-07-15
Attending: FAMILY MEDICINE
Payer: MEDICARE

## 2018-07-15 ENCOUNTER — APPOINTMENT (OUTPATIENT)
Dept: GENERAL RADIOLOGY | Age: 73
DRG: 854 | End: 2018-07-15
Attending: FAMILY MEDICINE
Payer: MEDICARE

## 2018-07-15 LAB
BACTERIA SPEC CULT: NORMAL
CC UR VC: NORMAL
CREAT SERPL-MCNC: 1.04 MG/DL (ref 0.55–1.02)
SERVICE CMNT-IMP: NORMAL

## 2018-07-15 PROCEDURE — 74011250636 HC RX REV CODE- 250/636: Performed by: FAMILY MEDICINE

## 2018-07-15 PROCEDURE — 74011636320 HC RX REV CODE- 636/320: Performed by: STUDENT IN AN ORGANIZED HEALTH CARE EDUCATION/TRAINING PROGRAM

## 2018-07-15 PROCEDURE — 71045 X-RAY EXAM CHEST 1 VIEW: CPT

## 2018-07-15 PROCEDURE — 74011250637 HC RX REV CODE- 250/637: Performed by: SURGERY

## 2018-07-15 PROCEDURE — 72193 CT PELVIS W/DYE: CPT

## 2018-07-15 PROCEDURE — 82565 ASSAY OF CREATININE: CPT | Performed by: INTERNAL MEDICINE

## 2018-07-15 PROCEDURE — 77010033678 HC OXYGEN DAILY

## 2018-07-15 PROCEDURE — 74011250637 HC RX REV CODE- 250/637: Performed by: FAMILY MEDICINE

## 2018-07-15 PROCEDURE — 74011000258 HC RX REV CODE- 258: Performed by: INTERNAL MEDICINE

## 2018-07-15 PROCEDURE — 74011250637 HC RX REV CODE- 250/637: Performed by: INTERNAL MEDICINE

## 2018-07-15 PROCEDURE — 65660000000 HC RM CCU STEPDOWN

## 2018-07-15 PROCEDURE — 74011250636 HC RX REV CODE- 250/636: Performed by: INTERNAL MEDICINE

## 2018-07-15 PROCEDURE — 36415 COLL VENOUS BLD VENIPUNCTURE: CPT | Performed by: INTERNAL MEDICINE

## 2018-07-15 PROCEDURE — 77030032490 HC SLV COMPR SCD KNE COVD -B

## 2018-07-15 RX ORDER — FUROSEMIDE 10 MG/ML
40 INJECTION INTRAMUSCULAR; INTRAVENOUS ONCE
Status: COMPLETED | OUTPATIENT
Start: 2018-07-15 | End: 2018-07-15

## 2018-07-15 RX ADMIN — SENNOSIDES 17.2 MG: 8.6 TABLET, FILM COATED ORAL at 22:08

## 2018-07-15 RX ADMIN — Medication 10 ML: at 06:45

## 2018-07-15 RX ADMIN — CEFEPIME HYDROCHLORIDE 2 G: 2 INJECTION, POWDER, FOR SOLUTION INTRAVENOUS at 20:27

## 2018-07-15 RX ADMIN — OXYCODONE HYDROCHLORIDE AND ACETAMINOPHEN 1 TABLET: 5; 325 TABLET ORAL at 18:21

## 2018-07-15 RX ADMIN — OXYCODONE HYDROCHLORIDE AND ACETAMINOPHEN 1 TABLET: 5; 325 TABLET ORAL at 08:32

## 2018-07-15 RX ADMIN — FUROSEMIDE 40 MG: 10 INJECTION, SOLUTION INTRAMUSCULAR; INTRAVENOUS at 18:21

## 2018-07-15 RX ADMIN — OXYCODONE HYDROCHLORIDE AND ACETAMINOPHEN 1 TABLET: 5; 325 TABLET ORAL at 20:26

## 2018-07-15 RX ADMIN — OXYCODONE HYDROCHLORIDE AND ACETAMINOPHEN 1 TABLET: 5; 325 TABLET ORAL at 12:48

## 2018-07-15 RX ADMIN — PANTOPRAZOLE SODIUM 40 MG: 40 TABLET, DELAYED RELEASE ORAL at 06:43

## 2018-07-15 RX ADMIN — STANDARDIZED SENNA CONCENTRATE AND DOCUSATE SODIUM 1 TABLET: 8.6; 5 TABLET, FILM COATED ORAL at 08:32

## 2018-07-15 RX ADMIN — ONDANSETRON 4 MG: 2 INJECTION INTRAMUSCULAR; INTRAVENOUS at 20:26

## 2018-07-15 RX ADMIN — POLYETHYLENE GLYCOL 3350 17 G: 17 POWDER, FOR SOLUTION ORAL at 22:08

## 2018-07-15 RX ADMIN — Medication 10 ML: at 18:21

## 2018-07-15 RX ADMIN — CEFEPIME HYDROCHLORIDE 2 G: 2 INJECTION, POWDER, FOR SOLUTION INTRAVENOUS at 08:30

## 2018-07-15 RX ADMIN — IOPAMIDOL 100 ML: 612 INJECTION, SOLUTION INTRAVENOUS at 11:00

## 2018-07-15 RX ADMIN — OXYCODONE HYDROCHLORIDE AND ACETAMINOPHEN 1 TABLET: 5; 325 TABLET ORAL at 02:08

## 2018-07-15 RX ADMIN — ONDANSETRON 4 MG: 2 INJECTION INTRAMUSCULAR; INTRAVENOUS at 08:30

## 2018-07-15 RX ADMIN — PROMETHAZINE HYDROCHLORIDE 25 MG: 25 INJECTION INTRAMUSCULAR; INTRAVENOUS at 11:53

## 2018-07-15 RX ADMIN — VANCOMYCIN HYDROCHLORIDE 1000 MG: 1 INJECTION, POWDER, LYOPHILIZED, FOR SOLUTION INTRAVENOUS at 01:21

## 2018-07-15 RX ADMIN — ONDANSETRON 4 MG: 2 INJECTION INTRAMUSCULAR; INTRAVENOUS at 02:08

## 2018-07-15 RX ADMIN — Medication 10 ML: at 23:03

## 2018-07-15 RX ADMIN — VANCOMYCIN HYDROCHLORIDE 750 MG: 10 INJECTION, POWDER, LYOPHILIZED, FOR SOLUTION INTRAVENOUS at 12:32

## 2018-07-15 RX ADMIN — LEVOFLOXACIN 750 MG: 5 INJECTION, SOLUTION INTRAVENOUS at 22:59

## 2018-07-15 RX ADMIN — SODIUM CHLORIDE 125 ML/HR: 900 INJECTION, SOLUTION INTRAVENOUS at 07:00

## 2018-07-15 NOTE — PROGRESS NOTES
1935: Bedside and Verbal shift change report given to Delon Wen RN (oncoming nurse) by Lena Bridges RN (offgoing nurse). Report included the following information SBAR, Kardex, ED Summary, Procedure Summary, Intake/Output, Recent Results, Med Rec Status and Cardiac Rhythm NSR.    2050: Pt requested Senakot for bowel regimen. Notified Dr. Fred Valladares of pt status. Dr. Villegas Fulling 2 tabs nightly. Bedside and Verbal shift change report given to Lena Bridges RN (oncoming nurse) by Delon Wen RN (offgoing nurse). Report included the following information SBAR, Kardex, ED Summary, Procedure Summary, Intake/Output, Recent Results, Med Rec Status and Cardiac Rhythm NSR.

## 2018-07-15 NOTE — PROGRESS NOTES
Daily Progress Note: 7/15/2018  Trev Hummel MD    Assessment/Plan:   Sepsis POA due to below:Fever (7/13/2018)/  Leukocytosis (7/14/2018)/  SIRS (systemic inflammatory response syndrome) ([facility] (7/14/2018): in the setting of immunosuppression.   --broad spectrum abx     Rhona-rectal abscess (7/14/2018): left side. Likely source of fever. IV antibiotics as above. S/p I+D on 7/14; wound cultures pending  --per surgery  --will order pelvic CT     Myelofibrosis ([facility] (7/14/2018)/ Anemia/Thrombocytopenia ([facility] (7/14/2018): [facility] at [facility]. Hgb up to 8s after 2 units of PRBCs 7/14  --heme following     Hypokalemia (7/14/2018): likely from vomiting. Replete and follow K+     Nausea and vomiting (7/14/2018): likely from infection as above. Hydrate. IV anti-emetics. Monitor     Code Status:  Full     Problem List:  Problem List as of 7/15/2018  Date Reviewed: 7/14/2018          Codes Class Noted - Resolved    Myelofibrosis ([facility] ICD-10-CM: D75.81  ICD-9-CM: 289.83  7/14/2018 - Present        Thrombocytopenia ([facility] ICD-10-CM: D69.6  ICD-9-CM: 287.5  7/14/2018 - Present        Hypokalemia ICD-10-CM: E87.6  ICD-9-CM: 276.8  7/14/2018 - Present        Leukocytosis ICD-10-CM: R10.715  ICD-9-CM: 288.60  7/14/2018 - Present        Nausea and vomiting ICD-10-CM: R11.2  ICD-9-CM: 787.01  7/14/2018 - Present        SIRS (systemic inflammatory response syndrome) ([facility] ICD-10-CM: R65.10  ICD-9-CM: 995.90  7/14/2018 - Present        Rhona-rectal abscess ICD-10-CM: K61.1  ICD-9-CM: 817  7/14/2018 - Present        * (Principal)Fever ICD-10-CM: R50.9  ICD-9-CM: 780.60  7/13/2018 - Present              Subjective:   Ms. Donna Zaragoza is a 75 y.o. female who is being admitted for Fever. Ms. Donna Zaragoza presented to our Emergency Department today complaining of intermittent fever and chills associated with generalized weakness.  She has also been having nausea and vomiting but denies any headaches, flu like or respiratory symptoms, No abdominal discomfort or diarrhea. She has not has any urinary symptoms. No overt focal symptoms. She does have a Hx of myelofibrosis and follows up at [facility]. Work up thus far has been unremarkable. In light of her immunosuppression, she will be admitted for further management. [Dr Andrés Tafoya     : pt found to have perirectal abscess. Afebrile since 5AM.  Still nauseated. NPO for I+D in OR today with Dr Becca Watson. 7/15: continues to be nauseated. Labs pending this AM.  Surgeon doesn't feel it is much better despite I+D yesterday. Pt says pain is better controlled with percocet. Review of Systems:   A comprehensive review of systems was negative except for that written in the HPI. Objective:   Physical Exam:     Visit Vitals    BP (!) 121/92    Pulse (!) 102    Temp 100 °F (37.8 °C)    Resp 17    Ht 5' 6\" (1.676 m)    Wt 68.1 kg (150 lb 3.2 oz)    SpO2 94%    BMI 24.24 kg/m2    O2 Flow Rate (L/min): 1 l/min O2 Device: Nasal cannula    Temp (24hrs), Av.3 °F (37.4 °C), Min:98.2 °F (36.8 °C), Max:100.2 °F (37.9 °C)         1901 - 07/15 0700  In: 7136.2 [P.O.:1065; I.V.:5467.9]  Out: 1875 [Urine:1225]    General:  Alert, cooperative, no distress, appears stated age, nauseated appearing   Head:  Normocephalic, without obvious abnormality, atraumatic. Eyes:  Conjunctivae/corneas clear. PERRL, EOMs intact. Nose: Nares normal. Septum midline. Throat: Lips, mucosa, and tongue dry   Neck: Supple, symmetrical, trachea midline, no adenopathy, thyroid: no enlargement/tenderness/nodules, no carotid bruit and no JVD. Back:   Symmetric, no curvature. ROM normal. No CVA tenderness. Lungs:   Clear to auscultation bilaterally. Chest wall:  No tenderness or deformity. Heart:  Regular rate and rhythm, S1, S2 normal, no murmur, click, rub or gallop. Abdomen:   Soft, non-tender. Bowel sounds normal. No masses,  No organomegaly.    : corcoran in place, L>R labia indurated and red, L glut and perirectal area dressed   Extremities: Extremities normal, atraumatic, no cyanosis or edema. No calf tenderness or cords. Pulses: 2+ and symmetric all extremities. Skin: Skin color, texture, turgor normal. No rashes or lesions   Neurologic: CNII-XII intact. Alert and oriented X 3. Fine motor of hands and fingers normal.   equal.  Gait not tested at this time. Sensation grossly normal to touch. Gross motor of extremities normal.       Data Review:       Recent Days:  Recent Labs      07/14/18   1740  07/14/18   0544  07/13/18 2041   WBC   --   21.9*  22.7*   HGB  8.7*  5.5*  6.9*   HCT  27.8*  18.2*  22.3*   PLT   --   57*  69*     Recent Labs      07/15/18   0224  07/14/18 0544  07/13/18 2041   NA   --   139  137   K   --   3.3*  3.0*   CL   --   106  100   CO2   --   20*  21   GLU   --   148*  192*   BUN   --   14  13   CREA  1.04*  1.11*  1.09*   CA   --   7.3*  8.1*   ALB   --   3.0*  3.7   SGOT   --   46*  57*   ALT   --   35  40     No results for input(s): PH, PCO2, PO2, HCO3, FIO2 in the last 72 hours.     24 Hour Results:  Recent Results (from the past 24 hour(s))   CULTURE, WOUND W GRAM STAIN    Collection Time: 07/14/18  2:04 PM   Result Value Ref Range    Special Requests: JOYCELYN RECTAL ABSCESS     GRAM STAIN NO WBC'S SEEN      GRAM STAIN NO ORGANISMS SEEN      Culture result: PENDING    HGB & HCT    Collection Time: 07/14/18  5:40 PM   Result Value Ref Range    HGB 8.7 (L) 11.5 - 16.0 g/dL    HCT 27.8 (L) 35.0 - 47.0 %   CREATININE    Collection Time: 07/15/18  2:24 AM   Result Value Ref Range    Creatinine 1.04 (H) 0.55 - 1.02 MG/DL    GFR est AA >60 >60 ml/min/1.73m2    GFR est non-AA 52 (L) >60 ml/min/1.73m2       Medications reviewed  Current Facility-Administered Medications   Medication Dose Route Frequency    promethazine (PHENERGAN) 25 mg in NS IVPB  25 mg IntraVENous Q8H PRN    cefepime (MAXIPIME) 2 g in 0.9% sodium chloride (MBP/ADV) 100 mL  2 g IntraVENous Q12H    polyethylene glycol (MIRALAX) packet 17 g  17 g Oral QHS    senna-docusate (PERICOLACE) 8.6-50 mg per tablet 1 Tab  1 Tab Oral DAILY    vancomycin (VANCOCIN) 1,000 mg in 0.9% sodium chloride (MBP/ADV) 250 mL  1,000 mg IntraVENous Q24H    pantoprazole (PROTONIX) tablet 40 mg  40 mg Oral ACB    oxyCODONE-acetaminophen (PERCOCET) 5-325 mg per tablet 1 Tab  1 Tab Oral Q4H PRN    HYDROmorphone (DILAUDID) tablet 1 mg  1 mg Oral Q4H PRN    senna (SENOKOT) tablet 17.2 mg  2 Tab Oral QHS    sodium chloride (NS) flush 5-10 mL  5-10 mL IntraVENous PRN    sodium chloride (NS) flush 5-10 mL  5-10 mL IntraVENous Q8H    sodium chloride (NS) flush 5-10 mL  5-10 mL IntraVENous PRN    acetaminophen (TYLENOL) tablet 650 mg  650 mg Oral Q4H PRN    0.9% sodium chloride infusion  125 mL/hr IntraVENous CONTINUOUS    ondansetron (ZOFRAN) injection 4 mg  4 mg IntraVENous Q4H PRN    levoFLOXacin (LEVAQUIN) 750 mg in D5W IVPB  750 mg IntraVENous Q48H    0.9% sodium chloride infusion 250 mL  250 mL IntraVENous PRN       Care Plan discussed with: Patient/Family and Nurse    Total time spent with patient: 30 minutes.     Rebecca Escalona MD

## 2018-07-15 NOTE — PROGRESS NOTES
Problem: Falls - Risk of  Goal: *Absence of Falls  Document Marcus Fall Risk and appropriate interventions in the flowsheet.    Outcome: Progressing Towards Goal  Fall Risk Interventions:  Mobility Interventions: Bed/chair exit alarm, Patient to call before getting OOB, Utilize walker, cane, or other assistive device         Medication Interventions: Bed/chair exit alarm, Patient to call before getting OOB, Teach patient to arise slowly    Elimination Interventions: Bed/chair exit alarm, Call light in reach, Patient to call for help with toileting needs, Toileting schedule/hourly rounds

## 2018-07-15 NOTE — PROGRESS NOTES
Called and spoke with Dr. Amadeo Watts and informed her of change in status. Decrease Sp02 on room air at 89% with a good wave form and increased work of breathing. Lung sounds are diminished compared to examination earlier today. Tachycardia at 115 bpm with no other ectopy. Temperature of 100.7F oral. IV Infusion of normal saline was stopped prior to transport off of unit at approximately 1530. Dr. Amadeo Watts agreed to rapid response. Client placed on 4 liters by nasal cannula and SpO2 up to 93%. Fluids are being held at this time until chest x-ray impression is provided. Will call Amadeo Vick with results of x-ray when completed.

## 2018-07-15 NOTE — PROGRESS NOTES
Progress Note    Patient: Juana Charles MRN: [id number]  SSN: [id number]    YOB: 1945  Age: 75 y.o. Sex: female      Admit Date: 2018    1 Day Post-Op    Procedure:  Procedure(s):  INCISION AND DRAINAGE, EXCISIONAL DEBRIDEMENT OF PERIRECTAL ABSCESS    Subjective:     Mrs. Carlos Bowman feels a little better. She has less nausea. Objective:     Visit Vitals    /66    Pulse 88    Temp 98.5 °F (36.9 °C)    Resp 16    Ht 5' 6\" (1.676 m)    Wt 150 lb 3.2 oz (68.1 kg)    SpO2 93%    BMI 24.24 kg/m2       Temp (24hrs), Av.1 °F (37.3 °C), Min:98.2 °F (36.8 °C), Max:100.2 °F (37.9 °C)      Physical Exam:    GENERAL: alert, cooperative, no distress, SKIN: The perianal wound is clean. There is still erythema and induration with edema, but no crepitus, necrosis or further blistering. Data Review: reviewed  cultures.     Lab Review:   BMP:   Lab Results   Component Value Date/Time    CREA 1.04 (H) 07/15/2018 02:24 AM    GFRAA >60 07/15/2018 02:24 AM    GFRNA 52 (L) 07/15/2018 02:24 AM     CBC:   Lab Results   Component Value Date/Time    HGB 8.7 (L) 2018 05:40 PM    HCT 27.8 (L) 2018 05:40 PM       Assessment:     Hospital Problems  Date Reviewed: 2018          Codes Class Noted POA    Myelofibrosis ([facility] ICD-10-CM: D75.81  ICD-9-CM: 289.83  2018 Yes        Thrombocytopenia ([facility] ICD-10-CM: D69.6  ICD-9-CM: 287.5  2018 Yes        Hypokalemia ICD-10-CM: E87.6  ICD-9-CM: 276.8  2018 Yes        Leukocytosis ICD-10-CM: M78.256  ICD-9-CM: 288.60  2018 Yes        Nausea and vomiting ICD-10-CM: R11.2  ICD-9-CM: 787.01  2018 Yes        [facility]ic inflammatory response syndrome) [facility] ICD-10-CM: R65.10  ICD-9-CM: 995.90  2018 Yes        Rhona-rectal abscess ICD-10-CM: K61.1  ICD-9-CM: 130  2018 Yes        * (Principal)Fever ICD-10-CM: R50.9  ICD-9-CM: 780.60  2018 Yes              Plan/Recommendations/Medical Decision Making: Continue IV antibiotics and LWC. Cultures negative so far. Path pending. Repeat WBC in AM.  CT of pelvis is not showing improvement.     Signed By: Alexander Smith MD     July 15, 2018

## 2018-07-15 NOTE — PROGRESS NOTES
Spiritual Care Assessment/Progress Note  [address]      NAME: Juana Charles      MRN: [id number]  AGE: 75 y.o. SEX: female  Shinto Affiliation: No Church   Language: English     7/15/2018     Total Time (in minutes): 10     Spiritual Assessment begun in SF 3 PROG CARE TELE 2 through conversation with:         [x]Patient        [] Family    [] Friend(s)        Reason for Consult: Rapid response team     Spiritual beliefs: (Please include comment if needed)     [] Identifies with a devon tradition:         [] Supported by a devon community:            [] Claims no spiritual orientation:           [] Seeking spiritual identity:                [] Adheres to an individual form of spirituality:           [x] Not able to assess:                           Identified resources for coping:      [] Prayer                               [] Music                  [] Guided Imagery     [] Family/friends                 [] Pet visits     [] Devotional reading                         [] Unknown     [] Other:                                              Interventions offered during this visit: (See comments for more details)                Plan of Care:     [x] Support spiritual and/or cultural needs    [] Support AMD and/or advance care planning process      [] Support grieving process   [] Coordinate Rites and/or Rituals    [] Coordination with community clergy   [] No spiritual needs identified at this time   [] Detailed Plan of Care below (See Comments)  [] Make referral to Music Therapy  [] Make referral to Pet Therapy     [] Make referral to Addiction services  [] Make referral to [facility]  [] Make referral to Spiritual Care Partner  [] No future visits requested        [] Follow up visits as needed     Comments:  responded to RRT call for pt on [facility]. Pt was busy with staff, no fam present. Chaplains will follow-up and check-on with pt at a later and more appropriate time as able.  Please notify us if any needs rise. Lizette Thomas M.S.   Spiritual Care Department  If needs rise please call Yolanda-PRAART ([phone])

## 2018-07-15 NOTE — PROGRESS NOTES
[facility] Dosing Services: Antimicrobial Stewardship Daily Doc    Consult for antibiotic dosing of Vancomycin and Cefepime by Dr. Shelley Mojica  Pharmacist reviewed antibiotic appropriateness for 75year old , female  for indication of fever, sepsis, immunocompromised  Day of Therapy 2    Vancomycin therapy:  Current maintenance dose: 1000mg q 24 hrs  Dose calculated to approximate a therapeutic trough of 15-20 mcg/mL. Plan for level / Adjustment in Therapy:  Empirically changed to 750mg every 12 hrs. Per surgery, no improvement despite I&D WBC slowly trending down, low level temp  Level ordered for 2330, 7/15/18    Non-Kinetic Antimicrobial Dosing Regimen:   Current Regimen:  Cefepime 2gm q 12 hrs  Recommendation: continue same  Dose administration notes:   Doses given appropriately as scheduled    Other Antimicrobial   (not dosed by pharmacist) Levaquin 750 mg IV every 48 hours   Cultures 7/14: Wound- pending  Anaerobic- pending  7/13: Urine- pending  7/13:Blood x2- NGTD- pending   Serum Creatinine Lab Results   Component Value Date/Time    Creatinine 1.04 (H) 07/15/2018 02:24 AM         Creatinine Clearance Estimated Creatinine Clearance: 44.4 mL/min (based on Cr of 1.04). Temp Temp: 100 °F (37.8 °C)       WBC Lab Results   Component Value Date/Time    WBC 21.9 (H) 07/14/2018 05:44 AM        H/H Lab Results   Component Value Date/Time    HGB 8.7 (L) 07/14/2018 05:40 PM        Platelets    Lab Results   Component Value Date/Time    PLATELET 57 (L) 85/54/7724 05:44 AM            Thank you,  Raechel Spurling, Pharm. D.

## 2018-07-15 NOTE — PROGRESS NOTES
Bedside and Verbal shift change report given to Ismael Celestin (oncoming nurse) by Jaden Henry RN (offgoing nurse). Report included the following information SBAR, Kardex, ED Summary, Intake/Output, Recent Results, Med Rec Status and Cardiac Rhythm NSR.     02:00 Notified by [employer] in Lab of critical Platelet value of 46. Notified Dr. Jodee Mathew of pt status. No new orders at this time. 02:15: Notified by [employer] in Lab of critical Potassium of 2.6. Notified Dr. Jodee Mathew of pt status. Dr. Mendez Coral 40 MEq of Klor con and 2 runs of 10 meq Potassium IV. Recheck Potassium level at 07:00 am.    Bedside and Verbal shift change report given to Jaden Henry RN (oncoming nurse) by Verónica Schofield RN (offgoing nurse). Report included the following information SBAR, Kardex, ED Summary, Intake/Output, Recent Results, Med Rec Status and Cardiac Rhythm NSR with ST depression.

## 2018-07-16 LAB
ANION GAP SERPL CALC-SCNC: 10 MMOL/L (ref 5–15)
ANION GAP SERPL CALC-SCNC: 8 MMOL/L (ref 5–15)
BACTERIA SPEC CULT: ABNORMAL
BACTERIA SPEC CULT: NORMAL
BASOPHILS # BLD: 0 K/UL (ref 0–0.1)
BASOPHILS NFR BLD: 0 % (ref 0–1)
BLASTS NFR BLD MANUAL: 1 %
BUN SERPL-MCNC: 10 MG/DL (ref 6–20)
BUN SERPL-MCNC: 10 MG/DL (ref 6–20)
BUN/CREAT SERPL: 12 (ref 12–20)
BUN/CREAT SERPL: 9 (ref 12–20)
CALCIUM SERPL-MCNC: 7.6 MG/DL (ref 8.5–10.1)
CALCIUM SERPL-MCNC: 7.8 MG/DL (ref 8.5–10.1)
CHLORIDE SERPL-SCNC: 101 MMOL/L (ref 97–108)
CHLORIDE SERPL-SCNC: 101 MMOL/L (ref 97–108)
CO2 SERPL-SCNC: 24 MMOL/L (ref 21–32)
CO2 SERPL-SCNC: 27 MMOL/L (ref 21–32)
CREAT SERPL-MCNC: 0.85 MG/DL (ref 0.55–1.02)
CREAT SERPL-MCNC: 1.08 MG/DL (ref 0.55–1.02)
DATE LAST DOSE: NORMAL
DIFFERENTIAL METHOD BLD: ABNORMAL
EOSINOPHIL # BLD: 0 K/UL (ref 0–0.4)
EOSINOPHIL NFR BLD: 0 % (ref 0–7)
ERYTHROCYTE [DISTWIDTH] IN BLOOD BY AUTOMATED COUNT: 20 % (ref 11.5–14.5)
GLUCOSE SERPL-MCNC: 115 MG/DL (ref 65–100)
GLUCOSE SERPL-MCNC: 126 MG/DL (ref 65–100)
GRAM STN SPEC: ABNORMAL
GRAM STN SPEC: ABNORMAL
HCT VFR BLD AUTO: 28.4 % (ref 35–47)
HGB BLD-MCNC: 9.2 G/DL (ref 11.5–16)
IMM GRANULOCYTES # BLD: 0 K/UL
IMM GRANULOCYTES NFR BLD AUTO: 0 %
LYMPHOCYTES # BLD: 1.1 K/UL (ref 0.8–3.5)
LYMPHOCYTES NFR BLD: 7 % (ref 12–49)
MCH RBC QN AUTO: 30.2 PG (ref 26–34)
MCHC RBC AUTO-ENTMCNC: 32.4 G/DL (ref 30–36.5)
MCV RBC AUTO: 93.1 FL (ref 80–99)
METAMYELOCYTES NFR BLD MANUAL: 1 %
MONOCYTES # BLD: 3.5 K/UL (ref 0–1)
MONOCYTES NFR BLD: 23 % (ref 5–13)
MYELOCYTES NFR BLD MANUAL: 1 %
NEUTS SEG # BLD: 10.1 K/UL (ref 1.8–8)
NEUTS SEG NFR BLD: 67 % (ref 32–75)
NRBC # BLD: 1.69 K/UL (ref 0–0.01)
NRBC BLD-RTO: 11.3 PER 100 WBC
PLATELET # BLD AUTO: 46 K/UL (ref 150–400)
POTASSIUM SERPL-SCNC: 2.6 MMOL/L (ref 3.5–5.1)
POTASSIUM SERPL-SCNC: 3.2 MMOL/L (ref 3.5–5.1)
RBC # BLD AUTO: 3.05 M/UL (ref 3.8–5.2)
RBC MORPH BLD: ABNORMAL
REPORTED DOSE,DOSE: NORMAL UNITS
REPORTED DOSE/TIME,TMG: NORMAL
SERVICE CMNT-IMP: ABNORMAL
SERVICE CMNT-IMP: NORMAL
SODIUM SERPL-SCNC: 135 MMOL/L (ref 136–145)
SODIUM SERPL-SCNC: 136 MMOL/L (ref 136–145)
VANCOMYCIN TROUGH SERPL-MCNC: 10 UG/ML (ref 5–10)
WBC # BLD AUTO: 15 K/UL (ref 3.6–11)

## 2018-07-16 PROCEDURE — 74011250637 HC RX REV CODE- 250/637: Performed by: FAMILY MEDICINE

## 2018-07-16 PROCEDURE — 74011000258 HC RX REV CODE- 258: Performed by: INTERNAL MEDICINE

## 2018-07-16 PROCEDURE — 65660000000 HC RM CCU STEPDOWN

## 2018-07-16 PROCEDURE — 74011000250 HC RX REV CODE- 250: Performed by: FAMILY MEDICINE

## 2018-07-16 PROCEDURE — 74011250636 HC RX REV CODE- 250/636: Performed by: INTERNAL MEDICINE

## 2018-07-16 PROCEDURE — 74011250637 HC RX REV CODE- 250/637: Performed by: INTERNAL MEDICINE

## 2018-07-16 PROCEDURE — 80048 BASIC METABOLIC PNL TOTAL CA: CPT | Performed by: FAMILY MEDICINE

## 2018-07-16 PROCEDURE — 74011250636 HC RX REV CODE- 250/636: Performed by: FAMILY MEDICINE

## 2018-07-16 PROCEDURE — 36415 COLL VENOUS BLD VENIPUNCTURE: CPT | Performed by: FAMILY MEDICINE

## 2018-07-16 PROCEDURE — 74011250637 HC RX REV CODE- 250/637: Performed by: SURGERY

## 2018-07-16 PROCEDURE — 80202 ASSAY OF VANCOMYCIN: CPT | Performed by: INTERNAL MEDICINE

## 2018-07-16 PROCEDURE — 85025 COMPLETE CBC W/AUTO DIFF WBC: CPT | Performed by: FAMILY MEDICINE

## 2018-07-16 RX ORDER — BUMETANIDE 0.25 MG/ML
1 INJECTION INTRAMUSCULAR; INTRAVENOUS EVERY 12 HOURS
Status: COMPLETED | OUTPATIENT
Start: 2018-07-16 | End: 2018-07-17

## 2018-07-16 RX ORDER — POTASSIUM CHLORIDE 7.45 MG/ML
10 INJECTION INTRAVENOUS
Status: COMPLETED | OUTPATIENT
Start: 2018-07-16 | End: 2018-07-28

## 2018-07-16 RX ORDER — POTASSIUM CHLORIDE 750 MG/1
40 TABLET, FILM COATED, EXTENDED RELEASE ORAL
Status: COMPLETED | OUTPATIENT
Start: 2018-07-16 | End: 2018-07-16

## 2018-07-16 RX ORDER — LEVOFLOXACIN 5 MG/ML
750 INJECTION, SOLUTION INTRAVENOUS EVERY 24 HOURS
Status: DISCONTINUED | OUTPATIENT
Start: 2018-07-16 | End: 2018-07-16

## 2018-07-16 RX ADMIN — POTASSIUM CHLORIDE 10 MEQ: 10 INJECTION, SOLUTION INTRAVENOUS at 07:50

## 2018-07-16 RX ADMIN — PIPERACILLIN SODIUM,TAZOBACTAM SODIUM 3.38 G: 3; .375 INJECTION, POWDER, FOR SOLUTION INTRAVENOUS at 16:48

## 2018-07-16 RX ADMIN — ACETAMINOPHEN 650 MG: 325 TABLET ORAL at 16:15

## 2018-07-16 RX ADMIN — VANCOMYCIN HYDROCHLORIDE 750 MG: 10 INJECTION, POWDER, LYOPHILIZED, FOR SOLUTION INTRAVENOUS at 01:29

## 2018-07-16 RX ADMIN — Medication 10 ML: at 23:29

## 2018-07-16 RX ADMIN — POTASSIUM CHLORIDE 10 MEQ: 10 INJECTION, SOLUTION INTRAVENOUS at 09:06

## 2018-07-16 RX ADMIN — POTASSIUM CHLORIDE 40 MEQ: 750 TABLET, EXTENDED RELEASE ORAL at 02:40

## 2018-07-16 RX ADMIN — SENNOSIDES 17.2 MG: 8.6 TABLET, FILM COATED ORAL at 21:47

## 2018-07-16 RX ADMIN — OXYCODONE HYDROCHLORIDE AND ACETAMINOPHEN 1 TABLET: 5; 325 TABLET ORAL at 01:20

## 2018-07-16 RX ADMIN — ACETAMINOPHEN 650 MG: 325 TABLET ORAL at 21:52

## 2018-07-16 RX ADMIN — ONDANSETRON 4 MG: 2 INJECTION INTRAMUSCULAR; INTRAVENOUS at 06:45

## 2018-07-16 RX ADMIN — Medication 10 ML: at 06:48

## 2018-07-16 RX ADMIN — PROMETHAZINE HYDROCHLORIDE 25 MG: 25 INJECTION INTRAMUSCULAR; INTRAVENOUS at 09:19

## 2018-07-16 RX ADMIN — PANTOPRAZOLE SODIUM 40 MG: 40 TABLET, DELAYED RELEASE ORAL at 06:45

## 2018-07-16 RX ADMIN — ALUMINUM HYDROXIDE AND MAGNESIUM HYDROXIDE 30 ML: 200; 200 SUSPENSION ORAL at 21:47

## 2018-07-16 RX ADMIN — BUMETANIDE 1 MG: 0.25 INJECTION INTRAMUSCULAR; INTRAVENOUS at 21:47

## 2018-07-16 RX ADMIN — POTASSIUM CHLORIDE 10 MEQ: 10 INJECTION, SOLUTION INTRAVENOUS at 13:00

## 2018-07-16 RX ADMIN — POTASSIUM CHLORIDE 10 MEQ: 10 INJECTION, SOLUTION INTRAVENOUS at 02:40

## 2018-07-16 RX ADMIN — POTASSIUM CHLORIDE 10 MEQ: 10 INJECTION, SOLUTION INTRAVENOUS at 11:02

## 2018-07-16 RX ADMIN — CEFEPIME HYDROCHLORIDE 2 G: 2 INJECTION, POWDER, FOR SOLUTION INTRAVENOUS at 10:25

## 2018-07-16 RX ADMIN — PIPERACILLIN SODIUM,TAZOBACTAM SODIUM 3.38 G: 3; .375 INJECTION, POWDER, FOR SOLUTION INTRAVENOUS at 23:27

## 2018-07-16 RX ADMIN — ONDANSETRON 4 MG: 2 INJECTION INTRAMUSCULAR; INTRAVENOUS at 02:58

## 2018-07-16 RX ADMIN — STANDARDIZED SENNA CONCENTRATE AND DOCUSATE SODIUM 1 TABLET: 8.6; 5 TABLET, FILM COATED ORAL at 09:07

## 2018-07-16 RX ADMIN — POTASSIUM CHLORIDE 10 MEQ: 10 INJECTION, SOLUTION INTRAVENOUS at 03:52

## 2018-07-16 RX ADMIN — VANCOMYCIN HYDROCHLORIDE 1000 MG: 1 INJECTION, POWDER, LYOPHILIZED, FOR SOLUTION INTRAVENOUS at 15:07

## 2018-07-16 RX ADMIN — ACETAMINOPHEN 650 MG: 325 TABLET ORAL at 08:08

## 2018-07-16 NOTE — PROGRESS NOTES
[facility] at [address], [address] [place] [address] W: [phone]  F: [phone] Reason for Visit:  
Renetta Martínez is a 75 y.o. female who is seen in consultation at the request of Dr. Coco Pride for evaluation of Myelofibrosis. History of Present Illness:  
Patient is a 75 y. o. with PMF who is admitted on 7/14/18 with c/o weakness, N/V and intermittent fevers x 5 days. She has a known h/o UTIs. Noted to have anemia, thrombocytopenia and leucocytosis on admission. CXR and UA unremarkable. She was started on Levaquin and Vancomycin and underwent I & D for a perirectal abscess on 7/14/18. She states that she underwent a BMT in 2013 for MF but relapsed soon after. Has since been on Jakafi and follows with Hematology at [facility]. She has also been on Exjade for transfusion iron overload. Does not think she has an enlarged spleen. She relocated from [facility] last year but was in the process of moving back later in this week. In the last 3-4 days she noted weakness, confusion, intermittent fevers and urinary incontinence. 1 Day prior to presentation she thought she felt a swelling in her rectal area. Hence she presented to the ER when she was found to have a temp of 102 F Since the I and D she has had a Tmax of 100F. Has better alertness, still has pain in the perirectal area, has no chills or bleeding. Notes some nausea x 1 day. No CP, SOB, Cough, dysuria. Has not had a BM since 3 days but has no abdominal discomfort Interval History:  
Feeling some better; had nausea this am but was able to eat lunch. No further complaints at present. Follows with Dr Tian Leonard/oncologist at [facility].([phone]); requesting call be made to inform her of admission Past Medical History:  
Diagnosis Date  Myelofibrosis ([address] Past Surgical History:  
Procedure Laterality Date  HX BONE MARROW TRANSPLANT  HX VASCULAR ACCESS    
 right [facility] Social History Substance Use Topics  Smoking status: Never Smoker  Smokeless tobacco: Never Used  Alcohol use Yes Comment: seldom Family History Problem Relation Age of Onset  Family history unknown: Yes Current Facility-Administered Medications Medication Dose Route Frequency  potassium chloride 10 mEq in 100 ml IVPB  10 mEq IntraVENous Q1H  
 promethazine (PHENERGAN) 25 mg in NS IVPB  25 mg IntraVENous Q8H PRN  
 vancomycin (VANCOCIN) 750 mg in 0.9% sodium chloride 250 mL IVPB  750 mg IntraVENous Q12H  cefepime (MAXIPIME) 2 g in 0.9% sodium chloride (MBP/ADV) 100 mL  2 g IntraVENous Q12H  polyethylene glycol (MIRALAX) packet 17 g  17 g Oral QHS  senna-docusate (PERICOLACE) 8.6-50 mg per tablet 1 Tab  1 Tab Oral DAILY  pantoprazole (PROTONIX) tablet 40 mg  40 mg Oral ACB  oxyCODONE-acetaminophen (PERCOCET) 5-325 mg per tablet 1 Tab  1 Tab Oral Q4H PRN  
 HYDROmorphone (DILAUDID) tablet 1 mg  1 mg Oral Q4H PRN  
 senna (SENOKOT) tablet 17.2 mg  2 Tab Oral QHS  sodium chloride (NS) flush 5-10 mL  5-10 mL IntraVENous PRN  
 sodium chloride (NS) flush 5-10 mL  5-10 mL IntraVENous Q8H  
 sodium chloride (NS) flush 5-10 mL  5-10 mL IntraVENous PRN  
 acetaminophen (TYLENOL) tablet 650 mg  650 mg Oral Q4H PRN  
 ondansetron (ZOFRAN) injection 4 mg  4 mg IntraVENous Q4H PRN  
 levoFLOXacin (LEVAQUIN) 750 mg in D5W IVPB  750 mg IntraVENous Q48H  
 0.9% sodium chloride infusion 250 mL  250 mL IntraVENous PRN No Known Allergies Review of Systems: A complete review of systems was obtained, negative except as described above. Physical Exam:  
 
Visit Vitals  /68 (BP 1 Location: Right arm, BP Patient Position: Supine)  Pulse 87  Temp 99.5 °F (37.5 °C)  Resp 20  
 Ht 5' 6\" (1.676 m)  Wt 68.5 kg (151 lb)  SpO2 97%  BMI 24.37 kg/m2 ECOG PS: 2 General: Nauseous but otherwise NAD Eyes: PERRLA, anicteric sclerae HENT: Atraumatic, OP clear Neck: Supple Lymphatic: No cervical, supraclavicular, or inguinal adenopathy Respiratory: CTAB, normal respiratory effort CV: Normal rate, regular rhythm, no murmurs, no peripheral edema GI: Soft, nontender, nondistended, no masses, no hepatomegaly, no splenomegaly MS:  Digits without clubbing or cyanosis. Skin: No rashes, ecchymoses, or petechiae. Normal temperature, turgor, and texture. Psych: Alert, oriented, appropriate affect, normal judgment/insight External genitalia Erythematous vulva. Packing noted Results:  
 
Lab Results Component Value Date/Time WBC 15.0 (H) 07/16/2018 01:23 AM  
 HGB 9.2 (L) 07/16/2018 01:23 AM  
 HCT 28.4 (L) 07/16/2018 01:23 AM  
 PLATELET 46 (LL) 39/81/0440 01:23 AM  
 MCV 93.1 07/16/2018 01:23 AM  
 ABS. NEUTROPHILS 10.1 (H) 07/16/2018 01:23 AM  
 
Lab Results Component Value Date/Time Sodium 135 (L) 07/16/2018 08:53 AM  
 Potassium 3.2 (L) 07/16/2018 08:53 AM  
 Chloride 101 07/16/2018 08:53 AM  
 CO2 24 07/16/2018 08:53 AM  
 Glucose 115 (H) 07/16/2018 08:53 AM  
 BUN 10 07/16/2018 08:53 AM  
 Creatinine 0.85 07/16/2018 08:53 AM  
 GFR est AA >60 07/16/2018 08:53 AM  
 GFR est non-AA >60 07/16/2018 08:53 AM  
 Calcium 7.6 (L) 07/16/2018 08:53 AM  
 
Lab Results Component Value Date/Time Bilirubin, total 1.0 07/14/2018 05:44 AM  
 ALT (SGPT) 35 07/14/2018 05:44 AM  
 AST (SGOT) 46 (H) 07/14/2018 05:44 AM  
 Alk. phosphatase 70 07/14/2018 05:44 AM  
 Protein, total 6.0 (L) 07/14/2018 05:44 AM  
 Albumin 3.0 (L) 07/14/2018 05:44 AM  
 Globulin 3.0 07/14/2018 05:44 AM  
 
 
7/15/2018 CT PELV W CONT IMPRESSION: No pelvic abscess identified. 
   
7/15/2018 XR CHEST IMPRESSION:  
  
New development of mild to moderate interstitial edema. Right perihilar airspace 
disease which may represent asymmetric pulmonary edema versus pneumonia. Assessment/Plan 1) Fever and darby rectal abscess S/p I & D,  
abx coverage: Vancomycin and Levaquin 2) Myelofibrosis Follows with heme/onc at [facility] : Dr Kaushik Hurtado S/p BMT in 2013, relapsed and since on Jakafi at 15 mg BID Criselda Joyce with known systemic infection Continue with daily CBC Recommend follow up with primary heme/onc at [facility] upon discharge: per patient's request called primary oncologist and informed of admission 3) Thrombocytopenia Secondary to MF, Jakafi and acute infection Monitor 4) Anemia (s/p 2 units PRBCs) Secondary to MF Hgb responded to trasnfusion Minimize transfusions and consider only if Hgb < 7 g/dl 5) Leukocytosis Secondary to MF and acute infection Blasts 5%- can be a result of acute infection. < 10% blasts can be seen with MF and does not indicate leukemic transformation Continue to monitor 6) Iron overload Hold Exjade 7) Hypokalemia Received repletion Continue to monitor Plan reviewed with Dr Angella Wallis Signed By: Alexus Mckeon NP

## 2018-07-16 NOTE — PROGRESS NOTES
7- Reason for Admission:  Fever, Perirectal Abscess                    RRAT Score:  5                   Plan for utilizing home health:  TBD                        Likelihood of Readmission:  Low                         Transition of Care Plan:  Home    I met with the pt to determine potential discharge needs. The pt lives with her , Tamie Reese ([phone]), in a one level house with one entry step. Her  is mostly wheelchair bound but her daughter, Steven Caro ([phone]), lives locally. She said she has been independent with her ADL's, uses no assistive devices and drives. They have sold their home in [place] and will be moving to a 2 story house in [place] will stay on the first level and live-in help will stay on the second level. Her PCP is Dr. Stacey Ko. She has prescription drug coverage and gets her medications from [facility] in [place]. CM will follow and assist with discharge needs as indicated. Care Management Interventions  PCP Verified by CM:  Yes (Dr. Earleen Cranker nurse navigator)  Discharge Durable Medical Equipment: No  Physical Therapy Consult: No  Occupational Therapy Consult: No  Speech Therapy Consult: No  Current Support Network: Lives with Spouse, Own Home  Confirm Follow Up Transport: Family  Plan discussed with Pt/Family/Caregiver: Yes  Discharge Location  Discharge Placement:  (Home with )    [place] Nima,  [place], CM

## 2018-07-16 NOTE — CONSULTS
Infectious Disease Consult    Impression/Plan   · Perirectal abscess. S/p I&D 7/14. Cultures growing ecoli. Will add anaerobic coverage with pip-tazo due to ongoing cellulitis. Hopefully deescalate to PO abx shortly depending on clinical response  · Myelofibrosis. Anemia/Thrombocytopenia Follows up at [facility]. · Immunosuppressed host.  Emerald Sidhu being held by oncology due to infection      Anti-infectives:   1. vanco  2. pip-tazo    Subjective:   Date of Consultation:  July 16, 2018  Date of Admission: 7/13/2018   Referring Physician:     Lorna Gaffney is a 75 y. o. white female with a h/o myelofibrosis who presents with generalized malaise, n/v, and fever for the past 5 days. Two days PTA , Mrs. Ramos noticed swelling of her left labia. She denies any pain or drainage. She has had multiple UTI's this year. W/u revealed a perirectal and she underwent I&D 7/14. Cultures are growing ecoli. ID consulted due to ongoing cellulitis despite abx    Patient Active Problem List   Diagnosis Code    Fever R50.9    Myelofibrosis ([facility] D75.81    Thrombocytopenia ([facility]) D69.6    Hypokalemia E87.6    Leukocytosis D72.829    Nausea and vomiting R11.2    SIRS (systemic inflammatory response syndrome) ([facility]) R65.10    Rhona-rectal abscess K61.1     Past Medical History:   Diagnosis Date    Myelofibrosis ([facility]       Family History   Problem Relation Age of Onset    Family history unknown: Yes      Social History   Substance Use Topics    Smoking status: Never Smoker    Smokeless tobacco: Never Used    Alcohol use Yes      Comment: seldom     Past Surgical History:   Procedure Laterality Date    HX BONE MARROW TRANSPLANT      HX VASCULAR ACCESS      right portacath      Prior to Admission medications    Medication Sig Start Date End Date Taking? Authorizing Provider   ruxolitinib (JAKAFI) 15 mg tab Take 15 mg by mouth two (2) times a day.    Yes Historical Provider   deferasirox (EXJADE) 500 mg oral disentegrating tablet Take 1,500 mg by mouth daily. Yes Historical Provider   therapeutic multivitamin (THERAGRAN) tablet Take 1 Tab by mouth daily. Yes Historical Provider   acetaminophen (TYLENOL) 325 mg tablet Take 650 mg by mouth every four (4) hours as needed for Pain. Yes Historical Provider     No Known Allergies     Review of Systems:  A comprehensive review of systems was negative except for that written in the History of Present Illness. Objective:   Blood pressure 129/72, pulse 87, temperature 99.2 °F (37.3 °C), resp. rate 18, height 5' 6\" (1.676 m), weight 68.5 kg (151 lb), SpO2 97 %. Temp (24hrs), Av.7 °F (37.6 °C), Min:98.6 °F (37 °C), Max:101.9 °F (38.8 °C)       Exam:    General:  Alert, cooperative, well noursished, well developed, appears stated age   Eyes:  Sclera anicteric. Mouth/Throat: Mucous membranes moist   Neck: Supple   Lungs:   No distress   CV:  Regular rate and rhythm   Abdomen:   Soft, non-tender   Extremities: No cyanosis or edema   Skin: Skin color, texture, turgor normal. no acute rash or lesions   : Erythema involving labia.   Perirectal wound packed   Musculoskeletal:    Lines/Devices:  Intact, no erythema, drainage or tenderness   Psych: Alert and oriented, normal mood affect given the setting       Data Review:   Recent Results (from the past 24 hour(s))   CBC WITH AUTOMATED DIFF    Collection Time: 18  1:23 AM   Result Value Ref Range    WBC 15.0 (H) 3.6 - 11.0 K/uL    RBC 3.05 (L) 3.80 - 5.20 M/uL    HGB 9.2 (L) 11.5 - 16.0 g/dL    HCT 28.4 (L) 35.0 - 47.0 %    MCV 93.1 80.0 - 99.0 FL    MCH 30.2 26.0 - 34.0 PG    MCHC 32.4 30.0 - 36.5 g/dL    RDW 20.0 (H) 11.5 - 14.5 %    PLATELET 46 (LL) 734 - 400 K/uL    NRBC 11.3 (H) 0  WBC    ABSOLUTE NRBC 1.69 (H) 0.00 - 0.01 K/uL    NEUTROPHILS 67 32 - 75 %    LYMPHOCYTES 7 (L) 12 - 49 %    MONOCYTES 23 (H) 5 - 13 %    EOSINOPHILS 0 0 - 7 %    BASOPHILS 0 0 - 1 %    METAMYELOCYTES 1 (H) 0 %    MYELOCYTES 1 (H) 0 %    BLASTS 1 (H) 0 %    IMMATURE GRANULOCYTES 0 %    ABS. NEUTROPHILS 10.1 (H) 1.8 - 8.0 K/UL    ABS. LYMPHOCYTES 1.1 0.8 - 3.5 K/UL    ABS. MONOCYTES 3.5 (H) 0.0 - 1.0 K/UL    ABS. EOSINOPHILS 0.0 0.0 - 0.4 K/UL    ABS. BASOPHILS 0.0 0.0 - 0.1 K/UL    ABS. IMM.  GRANS. 0.0 K/UL    DF MANUAL      RBC COMMENTS ANISOCYTOSIS  1+       METABOLIC PANEL, BASIC    Collection Time: 07/16/18  1:23 AM   Result Value Ref Range    Sodium 136 136 - 145 mmol/L    Potassium 2.6 (LL) 3.5 - 5.1 mmol/L    Chloride 101 97 - 108 mmol/L    CO2 27 21 - 32 mmol/L    Anion gap 8 5 - 15 mmol/L    Glucose 126 (H) 65 - 100 mg/dL    BUN 10 6 - 20 MG/DL    Creatinine 1.08 (H) 0.55 - 1.02 MG/DL    BUN/Creatinine ratio 9 (L) 12 - 20      GFR est AA >60 >60 ml/min/1.73m2    GFR est non-AA 50 (L) >60 ml/min/1.73m2    Calcium 7.8 (L) 8.5 - 10.1 MG/DL   VANCOMYCIN, TROUGH    Collection Time: 07/16/18  1:23 AM   Result Value Ref Range    Vancomycin,trough 10.0 5.0 - 10.0 ug/mL    Reported dose date: NOT PROVIDED      Reported dose time: NOT PROVIDED      Reported dose: NOT PROVIDED UNITS   METABOLIC PANEL, BASIC    Collection Time: 07/16/18  8:53 AM   Result Value Ref Range    Sodium 135 (L) 136 - 145 mmol/L    Potassium 3.2 (L) 3.5 - 5.1 mmol/L    Chloride 101 97 - 108 mmol/L    CO2 24 21 - 32 mmol/L    Anion gap 10 5 - 15 mmol/L    Glucose 115 (H) 65 - 100 mg/dL    BUN 10 6 - 20 MG/DL    Creatinine 0.85 0.55 - 1.02 MG/DL    BUN/Creatinine ratio 12 12 - 20      GFR est AA >60 >60 ml/min/1.73m2    GFR est non-AA >60 >60 ml/min/1.73m2    Calcium 7.6 (L) 8.5 - 10.1 MG/DL        Microbiology:     Studies:      Signed By: Julián George DO     July 16, 2018

## 2018-07-16 NOTE — PROGRESS NOTES
Daily Progress Note: 7/16/2018  Tammie Malave MD    Assessment/Plan:   Sepsis POA due to below:Fever (7/13/2018)/  Leukocytosis (7/14/2018)/  SIRS (systemic inflammatory response syndrome) ([facility] (7/14/2018): in the setting of immunosuppression.   --broad spectrum abx     Rhona-rectal abscess (7/14/2018): left side. Likely source of fever. IV antibiotics as above. S/p I+D on 7/14; wound cultures pending  --per surgery  --will order pelvic CT     Myelofibrosis ([facility] (7/14/2018)/ Anemia/Thrombocytopenia ([facility] (7/14/2018): follows up at [facility]. Hgb up to 8s after 2 units of PRBCs 7/14  --heme following     Hypokalemia (7/14/2018): likely from vomiting. Replete and follow K+     Nausea and vomiting (7/14/2018): likely from infection as above. Hydrate. IV anti-emetics. Monitor     Code Status:  Full     Problem List:  Problem List as of 7/16/2018  Date Reviewed: 7/14/2018          Codes Class Noted - Resolved    Myelofibrosis ([facility] ICD-10-CM: D75.81  ICD-9-CM: 289.83  7/14/2018 - Present        Thrombocytopenia ([facility] ICD-10-CM: D69.6  ICD-9-CM: 287.5  7/14/2018 - Present        Hypokalemia ICD-10-CM: E87.6  ICD-9-CM: 276.8  7/14/2018 - Present        Leukocytosis ICD-10-CM: C38.507  ICD-9-CM: 288.60  7/14/2018 - Present        Nausea and vomiting ICD-10-CM: R11.2  ICD-9-CM: 787.01  7/14/2018 - Present        SIRS (systemic inflammatory response syndrome) ([facility] ICD-10-CM: R65.10  ICD-9-CM: 995.90  7/14/2018 - Present        Rhona-rectal abscess ICD-10-CM: K61.1  ICD-9-CM: 138  7/14/2018 - Present        * (Principal)Fever ICD-10-CM: R50.9  ICD-9-CM: 780.60  7/13/2018 - Present              Subjective:   Ms. Denita May is a 75 y.o. female who is being admitted for Fever. Ms. Denita May presented to our Emergency Department today complaining of intermittent fever and chills associated with generalized weakness.  She has also been having nausea and vomiting but denies any headaches, flu like or respiratory symptoms, No abdominal discomfort or diarrhea. She has not has any urinary symptoms. No overt focal symptoms. She does have a Hx of myelofibrosis and follows up at [facility]. Work up thus far has been unremarkable. In light of her immunosuppression, she will be admitted for further management. [Dr Norton Hollow     : pt found to have perirectal abscess. Afebrile since 5AM.  Still nauseated. NPO for I+D in OR today with Dr Joyce Mcarthur. 7/15: continues to be nauseated. Labs pending this AM.  Surgeon doesn't feel it is much better despite I+D yesterday. Pt says pain is better controlled with percocet. 18: K+ 2.6. Repleting with IV. (6 total) WBC a little better. Tmax 98.8. Review of Systems:   A comprehensive review of systems was negative except for that written in the HPI. Objective:   Physical Exam:     Visit Vitals    /63    Pulse 83    Temp 98.8 °F (37.1 °C)    Resp 18    Ht 5' 6\" (1.676 m)    Wt 151 lb (68.5 kg)    SpO2 99%    BMI 24.37 kg/m2    O2 Flow Rate (L/min): 1 l/min O2 Device: Room air    Temp (24hrs), Av.6 °F (37.6 °C), Min:98.6 °F (37 °C), Max:100.7 °F (38.2 °C)    07/15 1901 -  0700  In: 1100 [P.O.:650; I.V.:450]  Out: 3725 [LDZVX:3305]   701 - 07/15 1900  In: 4530.8 [P.O.:565; I.V.:3362.5]  Out: 6479 [Urine:1775]    General:  Alert, cooperative, no distress, appears stated age, nauseated appearing   Head:  Normocephalic, without obvious abnormality, atraumatic. Eyes:  Conjunctivae/corneas clear. PERRL, EOMs intact. Nose: Nares normal. Septum midline. Throat: Lips, mucosa, and tongue dry   Neck: Supple, symmetrical, trachea midline, no adenopathy, thyroid: no enlargement/tenderness/nodules, no carotid bruit and no JVD. Back:   Symmetric, no curvature. ROM normal. No CVA tenderness. Lungs:   Clear to auscultation bilaterally. Chest wall:  No tenderness or deformity. Heart:  Regular rate and rhythm, S1, S2 normal, no murmur, click, rub or gallop. Abdomen:   Soft, non-tender. Bowel sounds normal. No masses,  No organomegaly. : corcoran in place, L>R labia indurated and red, L glut and perirectal area dressed   Extremities: Extremities normal, atraumatic, no cyanosis or edema. No calf tenderness or cords. Pulses: 2+ and symmetric all extremities. Skin: Skin color, texture, turgor normal. No rashes or lesions   Neurologic: CNII-XII intact. Alert and oriented X 3. Fine motor of hands and fingers normal.   equal.  Gait not tested at this time. Sensation grossly normal to touch. Gross motor of extremities normal.       Data Review:       Recent Days:  Recent Labs      07/16/18   0123  07/14/18   1740  07/14/18   0544  07/13/18   2041   WBC  15.0*   --   21.9*  22.7*   HGB  9.2*  8.7*  5.5*  6.9*   HCT  28.4*  27.8*  18.2*  22.3*   PLT  46*   --   57*  69*     Recent Labs      07/16/18   0123  07/15/18   0224  07/14/18 0544  07/13/18   2041   NA  136   --   139  137   K  2.6*   --   3.3*  3.0*   CL  101   --   106  100   CO2  27   --   20*  21   GLU  126*   --   148*  192*   BUN  10   --   14  13   CREA  1.08*  1.04*  1.11*  1.09*   CA  7.8*   --   7.3*  8.1*   ALB   --    --   3.0*  3.7   SGOT   --    --   46*  57*   ALT   --    --   35  40     No results for input(s): PH, PCO2, PO2, HCO3, FIO2 in the last 72 hours.     24 Hour Results:  Recent Results (from the past 24 hour(s))   CBC WITH AUTOMATED DIFF    Collection Time: 07/16/18  1:23 AM   Result Value Ref Range    WBC 15.0 (H) 3.6 - 11.0 K/uL    RBC 3.05 (L) 3.80 - 5.20 M/uL    HGB 9.2 (L) 11.5 - 16.0 g/dL    HCT 28.4 (L) 35.0 - 47.0 %    MCV 93.1 80.0 - 99.0 FL    MCH 30.2 26.0 - 34.0 PG    MCHC 32.4 30.0 - 36.5 g/dL    RDW 20.0 (H) 11.5 - 14.5 %    PLATELET 46 (LL) 467 - 400 K/uL    NRBC 11.3 (H) 0  WBC    ABSOLUTE NRBC 1.69 (H) 0.00 - 0.01 K/uL    NEUTROPHILS 67 32 - 75 %    LYMPHOCYTES 7 (L) 12 - 49 %    MONOCYTES 23 (H) 5 - 13 %    EOSINOPHILS 0 0 - 7 %    BASOPHILS 0 0 - 1 % METAMYELOCYTES 1 (H) 0 %    MYELOCYTES 1 (H) 0 %    BLASTS 1 (H) 0 %    IMMATURE GRANULOCYTES 0 %    ABS. NEUTROPHILS 10.1 (H) 1.8 - 8.0 K/UL    ABS. LYMPHOCYTES 1.1 0.8 - 3.5 K/UL    ABS. MONOCYTES 3.5 (H) 0.0 - 1.0 K/UL    ABS. EOSINOPHILS 0.0 0.0 - 0.4 K/UL    ABS. BASOPHILS 0.0 0.0 - 0.1 K/UL    ABS. IMM.  GRANS. 0.0 K/UL    DF MANUAL      RBC COMMENTS ANISOCYTOSIS  1+       METABOLIC PANEL, BASIC    Collection Time: 07/16/18  1:23 AM   Result Value Ref Range    Sodium 136 136 - 145 mmol/L    Potassium 2.6 (LL) 3.5 - 5.1 mmol/L    Chloride 101 97 - 108 mmol/L    CO2 27 21 - 32 mmol/L    Anion gap 8 5 - 15 mmol/L    Glucose 126 (H) 65 - 100 mg/dL    BUN 10 6 - 20 MG/DL    Creatinine 1.08 (H) 0.55 - 1.02 MG/DL    BUN/Creatinine ratio 9 (L) 12 - 20      GFR est AA >60 >60 ml/min/1.73m2    GFR est non-AA 50 (L) >60 ml/min/1.73m2    Calcium 7.8 (L) 8.5 - 10.1 MG/DL       Medications reviewed  Current Facility-Administered Medications   Medication Dose Route Frequency    potassium chloride 10 mEq in 100 ml IVPB  10 mEq IntraVENous Q1H    promethazine (PHENERGAN) 25 mg in NS IVPB  25 mg IntraVENous Q8H PRN    vancomycin (VANCOCIN) 750 mg in 0.9% sodium chloride 250 mL IVPB  750 mg IntraVENous Q12H    cefepime (MAXIPIME) 2 g in 0.9% sodium chloride (MBP/ADV) 100 mL  2 g IntraVENous Q12H    polyethylene glycol (MIRALAX) packet 17 g  17 g Oral QHS    senna-docusate (PERICOLACE) 8.6-50 mg per tablet 1 Tab  1 Tab Oral DAILY    pantoprazole (PROTONIX) tablet 40 mg  40 mg Oral ACB    oxyCODONE-acetaminophen (PERCOCET) 5-325 mg per tablet 1 Tab  1 Tab Oral Q4H PRN    HYDROmorphone (DILAUDID) tablet 1 mg  1 mg Oral Q4H PRN    senna (SENOKOT) tablet 17.2 mg  2 Tab Oral QHS    sodium chloride (NS) flush 5-10 mL  5-10 mL IntraVENous PRN    sodium chloride (NS) flush 5-10 mL  5-10 mL IntraVENous Q8H    sodium chloride (NS) flush 5-10 mL  5-10 mL IntraVENous PRN    acetaminophen (TYLENOL) tablet 650 mg  650 mg Oral Q4H PRN    ondansetron (ZOFRAN) injection 4 mg  4 mg IntraVENous Q4H PRN    levoFLOXacin (LEVAQUIN) 750 mg in D5W IVPB  750 mg IntraVENous Q48H    0.9% sodium chloride infusion 250 mL  250 mL IntraVENous PRN       Care Plan discussed with: Patient and Nurse    Total time spent with patient: 30 minutes.     Asia Green MD

## 2018-07-16 NOTE — PROGRESS NOTES
Progress Note    Patient: Mary Smith MRN: [id number]  SSN: [id number]    YOB: 1945  Age: 75 y.o. Sex: female      Admit Date: 2018    2 Days Post-Op    Procedure:  Procedure(s):  INCISION AND DRAINAGE, EXCISIONAL DEBRIDEMENT OF PERIRECTAL ABSCESS    Subjective:     Mrs. Tomás Veronica feels worse this AM.  She has some pain in her vaginal area. Objective:     Visit Vitals    /70 (BP 1 Location: Right arm, BP Patient Position: Supine)    Pulse 97    Temp (!) 101.9 °F (38.8 °C)    Resp 20    Ht 5' 6\" (1.676 m)    Wt 151 lb (68.5 kg)    SpO2 97%    BMI 24.37 kg/m2       Temp (24hrs), Av.8 °F (37.7 °C), Min:98.6 °F (37 °C), Max:101.9 °F (38.8 °C)      Physical Exam:    GENERAL: alert, cooperative, no distress, SKIN: The perianal wound is clean. There is persistent erythema, edema, and blistering of the skin. Data Review: reviewed  OR cultures.     Lab Review:   BMP:   Lab Results   Component Value Date/Time     2018 01:23 AM    K 2.6 (LL) 2018 01:23 AM     2018 01:23 AM    CO2 27 2018 01:23 AM    AGAP 8 2018 01:23 AM     (H) 2018 01:23 AM    BUN 10 2018 01:23 AM    CREA 1.08 (H) 2018 01:23 AM    GFRAA >60 2018 01:23 AM    GFRNA 50 (L) 2018 01:23 AM     CBC:   Lab Results   Component Value Date/Time    WBC 15.0 (H) 2018 01:23 AM    HGB 9.2 (L) 2018 01:23 AM    HCT 28.4 (L) 2018 01:23 AM    PLT 46 (LL) 2018 01:23 AM       Assessment:     Hospital Problems  Date Reviewed: 2018          Codes Class Noted POA    Myelofibrosis ([address] ICD-10-CM: M11.23  ICD-9-CM: 289.83  2018 Yes        Thrombocytopenia ([facility] ICD-10-CM: D69.6  ICD-9-CM: 287.5  2018 Yes        Hypokalemia ICD-10-CM: E87.6  ICD-9-CM: 276.8  2018 Yes        Leukocytosis ICD-10-CM: G25.216  ICD-9-CM: 288.60  2018 Yes        Nausea and vomiting ICD-10-CM: R11.2  ICD-9-CM: 787.01  2018 Yes SIRS (systemic inflammatory response syndrome) (HCC) ICD-10-CM: R65.10  ICD-9-CM: 995.90  7/14/2018 Yes        Rhona-rectal abscess ICD-10-CM: K61.1  ICD-9-CM: 484  7/14/2018 Yes        * (Principal)Fever ICD-10-CM: R50.9  ICD-9-CM: 780.60  7/13/2018 Yes              Plan/Recommendations/Medical Decision Making:     Fever this AM, WBC down. The wound is clean, but there is persistent cellulitis. Continue IV antibiotics and LWC. Final cultures and Path pending. Consult ID. Would leave corcoran for now to prevent contamination of labia and wound.     Signed By: Dustin Blulock MD     July 16, 2018

## 2018-07-16 NOTE — PROGRESS NOTES
[facility] Dosing Services: Antimicrobial Stewardship Daily Doc    Consult for antibiotic dosing of Vancomycin and Cefepime by Dr. Les Adan  Renal auto-adjust Levofloxacin for Dr Les Adan  Pharmacist reviewed antibiotic appropriateness for 75year old , female  for indication of fever, sepsis, immunocompromised  Day of Therapy 3    Vancomycin therapy:  Current maintenance dose: 750mg q 12 hrs  Dose calculated to approximate a therapeutic trough of 15-20 mcg/mL. Last trough level   7/16: 10 mcg/ml at 13 hrs post-dose    Plan for level / Adjustment in Therapy:  Increase slightly to 1000mg q 12 hrs. WBC improving, temp curve improving, SCr improved as well  Dose administration notes:   Doses given appropriately as scheduled    Non-Kinetic Antimicrobial Dosing Regimen:   Current Regimen:  Cefepime 2gm every 12 hrs, Levofloxacin 750mg IV every 48 hrs  Recommendation: Cefepime 2gm every 12 hrs, Levofloxacin 750mg IV every 24 hrs  Dose administration notes:   Doses given appropriately as scheduled    Other Antimicrobial   (not dosed by pharmacist)    Cultures 7/14: Wound- E coli (Pan-sen)- final  Anaerobic- NG- final  7/13: Urine- pending  7/13:Blood x2- NGTD- pending   Serum Creatinine Lab Results   Component Value Date/Time    Creatinine 0.85 07/16/2018 08:53 AM         Creatinine Clearance Estimated Creatinine Clearance: 54.4 mL/min (based on Cr of 0.85). Temp Temp: 99.2 °F (37.3 °C)       WBC Lab Results   Component Value Date/Time    WBC 15.0 (H) 07/16/2018 01:23 AM        H/H Lab Results   Component Value Date/Time    HGB 9.2 (L) 07/16/2018 01:23 AM        Platelets    Lab Results   Component Value Date/Time    PLATELET 46 (LL) 83/73/3584 01:23 AM            Thank you,  Erich Santana, Pharm. D.

## 2018-07-16 NOTE — PROGRESS NOTES
Bedside shift change report given to GURWINDER Coronado (oncoming nurse) by Cortes Ragland RN (offgoing nurse). Report included the following information SBAR, Kardex, Intake/Output, Recent Results and Cardiac Rhythm NSR.     0800 Patient has temperature of 101.9F. Tylenol given. Dr. Melva Ibrahim notified. Patient is currently receiving antibiotics. 0915 Repeat temperature taken. 99.5F.    1000 Wound assessed with physician at bedside. Physician suggested to put in a consult to infectious disease. 1430 Infectious disease physician at bedside to look at wound. Will change antibiotics to cover for anaerobic infection. 1600 patient has fever of 101F. Tylenol given. Patient weaned from [place].     [address] Patient weaned from oxygen at 1600. Sats are currently 88% on RA. Placed back on NC 2L. sats are at 94%    1715 repeat temp taken.  99.2F

## 2018-07-16 NOTE — PROGRESS NOTES
Problem: Falls - Risk of  Goal: *Absence of Falls  Document Marcus Fall Risk and appropriate interventions in the flowsheet.    Fall Risk Interventions:  Mobility Interventions: Bed/chair exit alarm         Medication Interventions: Patient to call before getting OOB    Elimination Interventions: Call light in reach, Bed/chair exit alarm

## 2018-07-17 LAB
ALBUMIN SERPL-MCNC: 2.4 G/DL (ref 3.5–5)
ALBUMIN/GLOB SERPL: 0.6 {RATIO} (ref 1.1–2.2)
ALP SERPL-CCNC: 161 U/L (ref 45–117)
ALT SERPL-CCNC: 51 U/L (ref 12–78)
ANION GAP SERPL CALC-SCNC: 11 MMOL/L (ref 5–15)
AST SERPL-CCNC: 85 U/L (ref 15–37)
BASOPHILS # BLD: 0.2 K/UL (ref 0–0.1)
BASOPHILS NFR BLD: 1 % (ref 0–1)
BILIRUB DIRECT SERPL-MCNC: 0.9 MG/DL (ref 0–0.2)
BILIRUB SERPL-MCNC: 1.6 MG/DL (ref 0.2–1)
BNP SERPL-MCNC: 1942 PG/ML (ref 0–125)
BUN SERPL-MCNC: 14 MG/DL (ref 6–20)
BUN/CREAT SERPL: 14 (ref 12–20)
CALCIUM SERPL-MCNC: 7.9 MG/DL (ref 8.5–10.1)
CHLORIDE SERPL-SCNC: 101 MMOL/L (ref 97–108)
CO2 SERPL-SCNC: 26 MMOL/L (ref 21–32)
CREAT SERPL-MCNC: 1.01 MG/DL (ref 0.55–1.02)
DIFFERENTIAL METHOD BLD: ABNORMAL
EOSINOPHIL # BLD: 0 K/UL (ref 0–0.4)
EOSINOPHIL NFR BLD: 0 % (ref 0–7)
ERYTHROCYTE [DISTWIDTH] IN BLOOD BY AUTOMATED COUNT: 19.8 % (ref 11.5–14.5)
GLOBULIN SER CALC-MCNC: 4 G/DL (ref 2–4)
GLUCOSE SERPL-MCNC: 147 MG/DL (ref 65–100)
HCT VFR BLD AUTO: 28.2 % (ref 35–47)
HGB BLD-MCNC: 9.2 G/DL (ref 11.5–16)
IMM GRANULOCYTES # BLD: 0 K/UL
IMM GRANULOCYTES NFR BLD AUTO: 0 %
LYMPHOCYTES # BLD: 1.5 K/UL (ref 0.8–3.5)
LYMPHOCYTES NFR BLD: 10 % (ref 12–49)
MAGNESIUM SERPL-MCNC: 2 MG/DL (ref 1.6–2.4)
MCH RBC QN AUTO: 30 PG (ref 26–34)
MCHC RBC AUTO-ENTMCNC: 32.6 G/DL (ref 30–36.5)
MCV RBC AUTO: 91.9 FL (ref 80–99)
METAMYELOCYTES NFR BLD MANUAL: 3 %
MONOCYTES # BLD: 4.6 K/UL (ref 0–1)
MONOCYTES NFR BLD: 30 % (ref 5–13)
MYELOCYTES NFR BLD MANUAL: 4 %
NEUTS BAND NFR BLD MANUAL: 7 % (ref 0–6)
NEUTS SEG # BLD: 8 K/UL (ref 1.8–8)
NEUTS SEG NFR BLD: 45 % (ref 32–75)
NRBC # BLD: 1.54 K/UL (ref 0–0.01)
NRBC BLD-RTO: 10 PER 100 WBC
PERIPHERAL SMEAR,PSM: NORMAL
PLATELET # BLD AUTO: 40 K/UL (ref 150–400)
POTASSIUM SERPL-SCNC: 2.5 MMOL/L (ref 3.5–5.1)
PROT SERPL-MCNC: 6.4 G/DL (ref 6.4–8.2)
RBC # BLD AUTO: 3.07 M/UL (ref 3.8–5.2)
RBC MORPH BLD: ABNORMAL
RBC MORPH BLD: ABNORMAL
SODIUM SERPL-SCNC: 138 MMOL/L (ref 136–145)
WBC # BLD AUTO: 15.4 K/UL (ref 3.6–11)

## 2018-07-17 PROCEDURE — 83880 ASSAY OF NATRIURETIC PEPTIDE: CPT | Performed by: SPECIALIST

## 2018-07-17 PROCEDURE — 80076 HEPATIC FUNCTION PANEL: CPT | Performed by: FAMILY MEDICINE

## 2018-07-17 PROCEDURE — 93306 TTE W/DOPPLER COMPLETE: CPT

## 2018-07-17 PROCEDURE — 80048 BASIC METABOLIC PNL TOTAL CA: CPT | Performed by: FAMILY MEDICINE

## 2018-07-17 PROCEDURE — 74011250637 HC RX REV CODE- 250/637: Performed by: INTERNAL MEDICINE

## 2018-07-17 PROCEDURE — 74011250636 HC RX REV CODE- 250/636: Performed by: INTERNAL MEDICINE

## 2018-07-17 PROCEDURE — 74011000250 HC RX REV CODE- 250: Performed by: FAMILY MEDICINE

## 2018-07-17 PROCEDURE — 85025 COMPLETE CBC W/AUTO DIFF WBC: CPT | Performed by: FAMILY MEDICINE

## 2018-07-17 PROCEDURE — 77010033678 HC OXYGEN DAILY

## 2018-07-17 PROCEDURE — 74011250637 HC RX REV CODE- 250/637: Performed by: FAMILY MEDICINE

## 2018-07-17 PROCEDURE — 74011000258 HC RX REV CODE- 258: Performed by: INTERNAL MEDICINE

## 2018-07-17 PROCEDURE — 74011250636 HC RX REV CODE- 250/636: Performed by: FAMILY MEDICINE

## 2018-07-17 PROCEDURE — 83735 ASSAY OF MAGNESIUM: CPT | Performed by: FAMILY MEDICINE

## 2018-07-17 PROCEDURE — 65660000000 HC RM CCU STEPDOWN

## 2018-07-17 PROCEDURE — 36415 COLL VENOUS BLD VENIPUNCTURE: CPT | Performed by: FAMILY MEDICINE

## 2018-07-17 RX ORDER — POTASSIUM CHLORIDE 7.45 MG/ML
10 INJECTION INTRAVENOUS
Status: DISPENSED | OUTPATIENT
Start: 2018-07-17 | End: 2018-07-17

## 2018-07-17 RX ORDER — POLYETHYLENE GLYCOL 3350 17 G/17G
17 POWDER, FOR SOLUTION ORAL DAILY
Status: DISCONTINUED | OUTPATIENT
Start: 2018-07-17 | End: 2018-07-19

## 2018-07-17 RX ORDER — POTASSIUM CHLORIDE 1.5 G/1.77G
20 POWDER, FOR SOLUTION ORAL
Status: DISCONTINUED | OUTPATIENT
Start: 2018-07-17 | End: 2018-07-21

## 2018-07-17 RX ORDER — POLYETHYLENE GLYCOL 3350 17 G/17G
17 POWDER, FOR SOLUTION ORAL DAILY
Status: DISCONTINUED | OUTPATIENT
Start: 2018-07-18 | End: 2018-07-17 | Stop reason: SDUPTHER

## 2018-07-17 RX ORDER — POTASSIUM CHLORIDE 7.45 MG/ML
10 INJECTION INTRAVENOUS ONCE
Status: COMPLETED | OUTPATIENT
Start: 2018-07-17 | End: 2018-07-28

## 2018-07-17 RX ADMIN — VANCOMYCIN HYDROCHLORIDE 1000 MG: 1 INJECTION, POWDER, LYOPHILIZED, FOR SOLUTION INTRAVENOUS at 03:39

## 2018-07-17 RX ADMIN — PIPERACILLIN SODIUM,TAZOBACTAM SODIUM 3.38 G: 3; .375 INJECTION, POWDER, FOR SOLUTION INTRAVENOUS at 06:43

## 2018-07-17 RX ADMIN — Medication 10 ML: at 06:36

## 2018-07-17 RX ADMIN — POTASSIUM CHLORIDE 10 MEQ: 10 INJECTION, SOLUTION INTRAVENOUS at 10:11

## 2018-07-17 RX ADMIN — POTASSIUM CHLORIDE 20 MEQ: 1.5 POWDER, FOR SOLUTION ORAL at 16:34

## 2018-07-17 RX ADMIN — POTASSIUM CHLORIDE 10 MEQ: 10 INJECTION, SOLUTION INTRAVENOUS at 04:51

## 2018-07-17 RX ADMIN — VANCOMYCIN HYDROCHLORIDE 1000 MG: 1 INJECTION, POWDER, LYOPHILIZED, FOR SOLUTION INTRAVENOUS at 16:34

## 2018-07-17 RX ADMIN — ACETAMINOPHEN 650 MG: 325 TABLET ORAL at 10:22

## 2018-07-17 RX ADMIN — POTASSIUM CHLORIDE 10 MEQ: 10 INJECTION, SOLUTION INTRAVENOUS at 06:34

## 2018-07-17 RX ADMIN — ACETAMINOPHEN 650 MG: 325 TABLET ORAL at 20:45

## 2018-07-17 RX ADMIN — BUMETANIDE 1 MG: 0.25 INJECTION INTRAMUSCULAR; INTRAVENOUS at 22:07

## 2018-07-17 RX ADMIN — Medication 10 ML: at 22:17

## 2018-07-17 RX ADMIN — POLYETHYLENE GLYCOL 3350 17 G: 17 POWDER, FOR SOLUTION ORAL at 13:29

## 2018-07-17 RX ADMIN — POTASSIUM CHLORIDE 20 MEQ: 1.5 POWDER, FOR SOLUTION ORAL at 13:30

## 2018-07-17 RX ADMIN — BUMETANIDE 1 MG: 0.25 INJECTION INTRAMUSCULAR; INTRAVENOUS at 08:57

## 2018-07-17 RX ADMIN — PANTOPRAZOLE SODIUM 40 MG: 40 TABLET, DELAYED RELEASE ORAL at 06:43

## 2018-07-17 RX ADMIN — ACETAMINOPHEN 650 MG: 325 TABLET ORAL at 03:47

## 2018-07-17 RX ADMIN — PIPERACILLIN SODIUM,TAZOBACTAM SODIUM 3.38 G: 3; .375 INJECTION, POWDER, FOR SOLUTION INTRAVENOUS at 14:33

## 2018-07-17 RX ADMIN — STANDARDIZED SENNA CONCENTRATE AND DOCUSATE SODIUM 1 TABLET: 8.6; 5 TABLET, FILM COATED ORAL at 08:57

## 2018-07-17 RX ADMIN — SENNOSIDES 17.2 MG: 8.6 TABLET, FILM COATED ORAL at 22:17

## 2018-07-17 RX ADMIN — Medication 10 ML: at 13:30

## 2018-07-17 RX ADMIN — PIPERACILLIN SODIUM,TAZOBACTAM SODIUM 3.38 G: 3; .375 INJECTION, POWDER, FOR SOLUTION INTRAVENOUS at 22:07

## 2018-07-17 NOTE — PROGRESS NOTES
[facility] [place] [facility] [address] [facility], [address] [place] [address] W: [phone]  F: [phone] Reason for Visit:  
Luís Moser is a 75 y.o. female who is seen in consultation at the request of Dr. Jose E Santana for evaluation of Myelofibrosis. History of Present Illness:  
Patient is a 75 y. o. with PMF who is admitted on 7/14/18 with c/o weakness, N/V and intermittent fevers x 5 days. She has a known h/o UTIs. Noted to have anemia, thrombocytopenia and leucocytosis on admission. CXR and UA unremarkable. She was started on Levaquin and Vancomycin and underwent I & D for a perirectal abscess on 7/14/18. She states that she underwent a BMT in 2013 for MF but relapsed soon after. Has since been on Jakafi and follows with Hematology at [facility]. She has also been on Exjade for transfusion iron overload. Does not think she has an enlarged spleen. She relocated from [facility] last year but was in the process of moving back later in this week. In the last 3-4 days she noted weakness, confusion, intermittent fevers and urinary incontinence. 1 Day prior to presentation she thought she felt a swelling in her rectal area. Hence she presented to the ER when she was found to have a temp of 102 F Since the I and D she has had a Tmax of 100F. Has better alertness, still has pain in the perirectal area, has no chills or bleeding. Notes some nausea x 1 day. No CP, SOB, Cough, dysuria. Has not had a BM since 3 days but has no abdominal discomfort Interval History:  
Feeling better; able to eat; denies N/V. No further complaints at present. Follows with Dr Eloy Leonard/oncologist at [facility].([phone]); requesting call be made to inform her of admission Past Medical History:  
Diagnosis Date  Myelofibrosis ([address] BMT in 2013 for MF but relapsed soon after. Has since been on Jakafi and follows with Hematology at [facility] Past Surgical History:  
Procedure Laterality Date  HX BONE MARROW TRANSPLANT  HX VASCULAR ACCESS    
 right portacath Social History Substance Use Topics  Smoking status: Never Smoker  Smokeless tobacco: Never Used  Alcohol use Yes Comment: seldom Family History Problem Relation Age of Onset  Heart Attack Mother Current Facility-Administered Medications Medication Dose Route Frequency  [START ON 7/18/2018] Vancomycin - trough @ 0400 (7/18/18 - Wednesday)   Other ONCE  potassium chloride (KLOR-CON) packet 20 mEq  20 mEq Oral TID WITH MEALS  vancomycin (VANCOCIN) 1,000 mg in 0.9% sodium chloride (MBP/ADV) 250 mL  1,000 mg IntraVENous Q12H  piperacillin-tazobactam (ZOSYN) 3.375 g in 0.9% sodium chloride (MBP/ADV) 100 mL  3.375 g IntraVENous Q8H  
 bumetanide (BUMEX) injection 1 mg  1 mg IntraVENous Q12H  
 ruxolitinib tab 15 mg (Patient Supplied)  15 mg Oral DAILY  aluminum-magnesium hydroxide (MAALOX) oral suspension 30 mL  30 mL Oral QID PRN  promethazine (PHENERGAN) 25 mg in NS IVPB  25 mg IntraVENous Q8H PRN  polyethylene glycol (MIRALAX) packet 17 g  17 g Oral QHS  senna-docusate (PERICOLACE) 8.6-50 mg per tablet 1 Tab  1 Tab Oral DAILY  pantoprazole (PROTONIX) tablet 40 mg  40 mg Oral ACB  oxyCODONE-acetaminophen (PERCOCET) 5-325 mg per tablet 1 Tab  1 Tab Oral Q4H PRN  
 HYDROmorphone (DILAUDID) tablet 1 mg  1 mg Oral Q4H PRN  
 senna (SENOKOT) tablet 17.2 mg  2 Tab Oral QHS  sodium chloride (NS) flush 5-10 mL  5-10 mL IntraVENous PRN  
 sodium chloride (NS) flush 5-10 mL  5-10 mL IntraVENous Q8H  
 sodium chloride (NS) flush 5-10 mL  5-10 mL IntraVENous PRN  
 acetaminophen (TYLENOL) tablet 650 mg  650 mg Oral Q4H PRN  
 ondansetron (ZOFRAN) injection 4 mg  4 mg IntraVENous Q4H PRN  
 0.9% sodium chloride infusion 250 mL  250 mL IntraVENous PRN No Known Allergies Review of Systems: A complete review of systems was obtained, negative except as described above. Physical Exam:  
 
Visit Vitals  /66  Pulse 84  Temp 98.3 °F (36.8 °C)  Resp 20  
 Ht 5' 6\" (1.676 m)  Wt 68 kg (150 lb)  SpO2 98%  BMI 24.21 kg/m2 ECOG PS: 2 General: Nauseous but otherwise NAD Eyes: PERRLA, anicteric sclerae HENT: Atraumatic, OP clear Neck: Supple Respiratory: CTAB, normal respiratory effort CV: tachyrate, regular rhythm, no murmurs, no peripheral edema GI: Soft, nontender, nondistended, no masses, no hepatomegaly, no splenomegaly MS:  Digits without clubbing or cyanosis. Skin: No rashes, ecchymoses, or petechiae. Normal temperature, turgor, and texture. Psych: Alert, oriented, appropriate affect, normal judgment/insight External genitalia Erythematous vulva. Packing noted Results:  
 
Lab Results Component Value Date/Time WBC 15.4 (H) 07/17/2018 01:34 AM  
 HGB 9.2 (L) 07/17/2018 01:34 AM  
 HCT 28.2 (L) 07/17/2018 01:34 AM  
 PLATELET 40 (LL) 78/93/5040 01:34 AM  
 MCV 91.9 07/17/2018 01:34 AM  
 ABS. NEUTROPHILS 8.0 07/17/2018 01:34 AM  
 
Lab Results Component Value Date/Time Sodium 138 07/17/2018 01:34 AM  
 Potassium 2.5 (LL) 07/17/2018 01:34 AM  
 Chloride 101 07/17/2018 01:34 AM  
 CO2 26 07/17/2018 01:34 AM  
 Glucose 147 (H) 07/17/2018 01:34 AM  
 BUN 14 07/17/2018 01:34 AM  
 Creatinine 1.01 07/17/2018 01:34 AM  
 GFR est AA >60 07/17/2018 01:34 AM  
 GFR est non-AA 54 (L) 07/17/2018 01:34 AM  
 Calcium 7.9 (L) 07/17/2018 01:34 AM  
 
Lab Results Component Value Date/Time Bilirubin, total 1.0 07/14/2018 05:44 AM  
 ALT (SGPT) 35 07/14/2018 05:44 AM  
 AST (SGOT) 46 (H) 07/14/2018 05:44 AM  
 Alk. phosphatase 70 07/14/2018 05:44 AM  
 Protein, total 6.0 (L) 07/14/2018 05:44 AM  
 Albumin 3.0 (L) 07/14/2018 05:44 AM  
 Globulin 3.0 07/14/2018 05:44 AM  
 
 
7/15/2018 CT PELV W CONT IMPRESSION: No pelvic abscess identified. 
   
7/15/2018 XR CHEST IMPRESSION:  
  
New development of mild to moderate interstitial edema. Right perihilar airspace 
disease which may represent asymmetric pulmonary edema versus pneumonia. Assessment/Plan 1) Fever and darby rectal abscess S/p I & D,  
abx coverage:   Vancomycin and Levaquin 2) Myelofibrosis Follows with heme/onc at [facility] : Dr James Clement S/p BMT in 2013, relapsed and since on Jakafi at 15 mg BID Primary oncologist / Dr Wilder Hooker recommending resuming Jakafi at half dose and weaning off over the week; resumed at 15 mg daily. Continue with daily CBC Recommend follow up with primary heme/onc at [facility] upon discharge: already has appt in mid-Aug.  
 
3) Thrombocytopenia Secondary to MF, Jakafi and acute infection Monitor 4) Anemia (s/p 2 units PRBCs) Secondary to MF Hgb stable Minimize transfusions and consider only if Hgb < 7 g/dl 5) Leukocytosis Secondary to MF and acute infection Blasts 5%- can be a result of acute infection. < 10% blasts can be seen with MF and does not indicate leukemic transformation Continue to monitor 6) Iron overload Hold Exjade 7) Hypokalemia Received repletion Continue to monitor Plan reviewed with Dr Toney Lozano Signed By: Josefa Rehman NP

## 2018-07-17 NOTE — PROGRESS NOTES
Bedside and Verbal shift change report given to Maulik Sosa (oncoming nurse) by Tiffany Carmichael (offgoing nurse). Report included the following information SBAR, Kardex, ED Summary, Procedure Summary, Intake/Output, MAR, Accordion, Recent Results, Med Rec Status and Cardiac Rhythm NSR.     1108: Attempted to wean patient off oxygen, she was on RA while sleeping and SPO2 decreased to and sustained 88-89%. I re-applied 2L oxygen and will continue to monitor. 1300: P/c to Dr. Edvin Tam, left vsmg in reference to patient's complaint of constipation and assessment: Jaundice: noted in bilateral sclera (patient had stated that people were aware). Asked him to call me back regarding the patient. 1308: New orders entered by Dr. Edvin Tam. I will obtain labs and administer new medication. 1350: Patient stated Dr. Andrew Kenney requested a call when wound care was going to be completed so that he could assess area. Echo tech in the room performing echo. Labs drawn (hepatic panel and lavender drawn (for hold)). 1358: P/c to Dr. Andrew Kenney office, spoke with Luli Cat, she will have him call me back. Will await a call from him. 1415: Azzie Common in the room, dressing changed. 1500: Bedside and Verbal shift change report given to Sophia (oncoming nurse) by Maulik Sosa (offgoing nurse). Report included the following information SBAR, Kardex, ED Summary, Intake/Output, MAR, Accordion, Recent Results, Med Rec Status and Cardiac Rhythm NSR. Alok Esparza will call Dr. Graciela Pinon with results of Hepatic panel.

## 2018-07-17 NOTE — CONSULTS
Kan Montero MD. [facility]              Patient: Renetta Martínez  : 1945      Today's Date: 2018            HISTORY OF PRESENT ILLNESS:     History of Present Illness:  Reason for consult: Pulm edema     Patient is a 75year old female with a past medical history significant for myelofibrosis who presents to the ED via EMS with complaints of intermittent fever, weakness, and n/v on 18. She has been treated for sepsis and a darby-rectal abscess. She also has had anemia and thrombocytopenia (requiring transfusion). Cardiology consulted after CXR on 7/15/18 showed \"New development of mild to moderate interstitial edema. Right perihilar airspace disease which may represent asymmetric pulmonary edema versus pneumonia. \". Ms. Aurelio Moore denies a history of heart problems. Has had SOB, cough lately laying in bed - also with fevers. No SOB. PAST MEDICAL HISTORY:     Past Medical History:   Diagnosis Date    Myelofibrosis ([address]      BMT in  for MF but relapsed soon after. Has since been on Jakafi and follows with Hematology at [facility]         Past Surgical History:   Procedure Laterality Date    HX BONE MARROW TRANSPLANT      HX VASCULAR ACCESS      right portacath           MEDICATIONS:         No Known Allergies          SOCIAL HISTORY:     Social History   Substance Use Topics    Smoking status: Never Smoker    Smokeless tobacco: Never Used    Alcohol use Yes      Comment: seldom         FAMILY HISTORY:     Family History   Problem Relation Age of Onset    Heart Attack Mother              REVIEW OF SYMPTOMS:     Review of Symptoms:  Constitutional: + fever  HEENT: Negative for nosebleeds   Respiratory: + SOB  Cardiovascular: Negative for  Syncope   Gastrointestinal: Negative for   melena. Genitourinary: Negative for dysuria  Musculoskeletal: Negative for myalgias. Skin: Negative for rash  Heme: No problems bleeding.   Neurological: Negative for speech change and focal weakness. PHYSICAL EXAM:     Physical Exam:  Visit Vitals    /60    Pulse 96    Temp 98.6 °F (37 °C)    Resp 18    Ht 5' 6\" (1.676 m)    Wt 150 lb (68 kg)    SpO2 96%    BMI 24.21 kg/m2     Patient appears generally well, mood and affect are appropriate and pleasant. HEENT:  Hearing intact, non-icteric, normocephalic, atraumatic. Neck Exam: Supple - no clear JVD at 45 degrees   Lung Exam: crackles on right side>left   Cardiac Exam: Regular rate and rhythm with 2/6 systolic murmur  Abdomen: Soft, non-tender   Extremities: Moves all ext well. No lower extremity edema. Vascular: 2+ dorsalis pedis pulses bilaterally.   Psych: Appropriate affect  Neuro - Grossly intact        Intake/Output Summary (Last 24 hours) at 07/17/18 0803  Last data filed at 07/17/18 0500   Gross per 24 hour   Intake             2530 ml   Output             3400 ml   Net             -870 ml           LABS / OTHER STUDIES:       Recent Results (from the past 24 hour(s))   METABOLIC PANEL, BASIC    Collection Time: 07/16/18  8:53 AM   Result Value Ref Range    Sodium 135 (L) 136 - 145 mmol/L    Potassium 3.2 (L) 3.5 - 5.1 mmol/L    Chloride 101 97 - 108 mmol/L    CO2 24 21 - 32 mmol/L    Anion gap 10 5 - 15 mmol/L    Glucose 115 (H) 65 - 100 mg/dL    BUN 10 6 - 20 MG/DL    Creatinine 0.85 0.55 - 1.02 MG/DL    BUN/Creatinine ratio 12 12 - 20      GFR est AA >60 >60 ml/min/1.73m2    GFR est non-AA >60 >60 ml/min/1.73m2    Calcium 7.6 (L) 8.5 - 10.1 MG/DL   CBC WITH AUTOMATED DIFF    Collection Time: 07/17/18  1:34 AM   Result Value Ref Range    WBC 15.4 (H) 3.6 - 11.0 K/uL    RBC 3.07 (L) 3.80 - 5.20 M/uL    HGB 9.2 (L) 11.5 - 16.0 g/dL    HCT 28.2 (L) 35.0 - 47.0 %    MCV 91.9 80.0 - 99.0 FL    MCH 30.0 26.0 - 34.0 PG    MCHC 32.6 30.0 - 36.5 g/dL    RDW 19.8 (H) 11.5 - 14.5 %    PLATELET 40 (LL) 878 - 400 K/uL    NRBC 10.0 (H) 0  WBC    ABSOLUTE NRBC 1.54 (H) 0.00 - 0.01 K/uL    NEUTROPHILS 45 32 - 75 %    BAND NEUTROPHILS 7 (H) 0 - 6 %    LYMPHOCYTES 10 (L) 12 - 49 %    MONOCYTES 30 (H) 5 - 13 %    EOSINOPHILS 0 0 - 7 %    BASOPHILS 1 0 - 1 %    METAMYELOCYTES 3 (H) 0 %    MYELOCYTES 4 (H) 0 %    IMMATURE GRANULOCYTES 0 %    ABS. NEUTROPHILS 8.0 1.8 - 8.0 K/UL    ABS. LYMPHOCYTES 1.5 0.8 - 3.5 K/UL    ABS. MONOCYTES 4.6 (H) 0.0 - 1.0 K/UL    ABS. EOSINOPHILS 0.0 0.0 - 0.4 K/UL    ABS. BASOPHILS 0.2 (H) 0.0 - 0.1 K/UL    ABS. IMM. GRANS. 0.0 K/UL    DF MANUAL      RBC COMMENTS STOMATOCYTES  1+        RBC COMMENTS ANISOCYTOSIS  2+       METABOLIC PANEL, BASIC    Collection Time: 07/17/18  1:34 AM   Result Value Ref Range    Sodium 138 136 - 145 mmol/L    Potassium 2.5 (LL) 3.5 - 5.1 mmol/L    Chloride 101 97 - 108 mmol/L    CO2 26 21 - 32 mmol/L    Anion gap 11 5 - 15 mmol/L    Glucose 147 (H) 65 - 100 mg/dL    BUN 14 6 - 20 MG/DL    Creatinine 1.01 0.55 - 1.02 MG/DL    BUN/Creatinine ratio 14 12 - 20      GFR est AA >60 >60 ml/min/1.73m2    GFR est non-AA 54 (L) >60 ml/min/1.73m2    Calcium 7.9 (L) 8.5 - 10.1 MG/DL   NT-PRO BNP    Collection Time: 07/17/18  1:34 AM   Result Value Ref Range    NT pro-BNP 1942 (H) 0 - 125 PG/ML             CARDIAC DIAGNOSTICS:     Cardiac Evaluation Includes:    EKG 7/13/18 - NSR, inferior and anterolateral ST-T changes     CXR on 7/15/18 showed \"New development of mild to moderate interstitial edema. Right perihilar airspace disease which may represent asymmetric pulmonary edema versus pneumonia. \". I viewed EKG and CXR myself. ASSESSMENT AND PLAN:     Assessment and Plan:  Patient is a 75year old female with a past medical history significant for myelofibrosis who presents to the ED via EMS with complaints of intermittent fever, weakness, and n/v on 7/13/18. She has been treated for sepsis and a darby-rectal abscess. She also has had anemia and thrombocytopenia (requiring transfusion).   Cardiology consulted after CXR on 7/15/18 showed \"New development of mild to moderate interstitial edema. Right perihilar airspace disease which may represent asymmetric pulmonary edema versus pneumonia. \". 1) Pulm Edema  - I viewed CXR - right sided airspace disease asymmetric pulmonary edema versus pneumonia -- given fevers, suspect infectious process may be playing some role   - Exam was unrevealing (no obvious JVD) - but was diuresed some overnight   - proBNP level was elevated (can be non-specific at times)   - Will check an echo to assess LV function and filling pressures  - she was diuresed with IV Bumex starting last night and does feel better - will continue some lasix today as well    2) Will follow. Teddy Ramírez MD, [address], [address] [place] [address]  Ph: [phone]   Ph [phone]

## 2018-07-17 NOTE — DISCHARGE INSTRUCTIONS
Ruxolitinib [facility]) - patient's own med. Please send home with the patient upon discharge. Patient Discharge Instructions    Webster Popper / [id number] : 1945    Admitted 2018 Discharged: 2018 8:24 AM     ACUTE DIAGNOSES:  Fever  PERIRECTAL ABSCESS  PERIRECTAL ABSCESS  PERIRECTAL ABSCESS    CHRONIC MEDICAL DIAGNOSES:  Problem List as of 2018  Date Reviewed: 2018          Codes Class Noted - Resolved    Myelofibrosis ([facility] ICD-10-CM: D75.81  ICD-9-CM: 289.83  2018 - Present        Thrombocytopenia ([facility] ICD-10-CM: D69.6  ICD-9-CM: 287.5  2018 - Present        Hypokalemia ICD-10-CM: E87.6  ICD-9-CM: 276.8  2018 - Present        Leukocytosis ICD-10-CM: F66.456  ICD-9-CM: 288.60  2018 - Present        Nausea and vomiting ICD-10-CM: R11.2  ICD-9-CM: 787.01  2018 - Present        SIRS (systemic inflammatory response syndrome) ([facility] ICD-10-CM: R65.10  ICD-9-CM: 995.90  2018 - Present        Rhona-rectal abscess ICD-10-CM: K61.1  ICD-9-CM: 058  2018 - Present        * (Principal)Fever ICD-10-CM: R50.9  ICD-9-CM: 780.60  2018 - Present              DISCHARGE MEDICATIONS:         · It is important that you take the medication exactly as they are prescribed. · Keep your medication in the bottles provided by the pharmacist and keep a list of the medication names, dosages, and times to be taken in your wallet. · Do not take other medications without consulting your doctor. DIET:  Regular Diet    ACTIVITY: Activity as tolerated    ADDITIONAL INFORMATION: If you experience any of the following symptoms then please call your primary care physician or return to the emergency room if you cannot get hold of your doctor: Fever, chills, nausea, vomiting, diarrhea, change in mentation, falling, bleeding, shortness of breath. FOLLOW UP CARE:  Dr. Justin Pollock MD  you are to call and set up an appointment to see them with in 1 week.     Follow-up with specialists at directed by them      Information obtained by :  I understand that if any problems occur once I am at home I am to contact my physician. I understand and acknowledge receipt of the instructions indicated above.                                                                                                                                            Physician's or R.N.'s Signature                                                                  Date/Time                                                                                                                                              Patient or Representative Signature                                                          Date/Time

## 2018-07-17 NOTE — PROGRESS NOTES
[facility] Progress Note           Abhishek Low DO    [phone] Office  [phone]  Fax    2018      Assessment & Plan:   1. Perirectal abscess. S/p I&D . Cultures growing ecoli. Abx changed to pip-tazo . Patient feeling much better today. Will examine wound at dressing change. 2. Myelofibrosis. Anemia/Thrombocytopenia Follows up at [facility].    3. Immunosuppressed host.  Karolyn Malhotra being held by oncology due to infection  4. CHF. Cardiology following          Subjective:     Feeling much better today    Objective:     Vitals:   Visit Vitals    /66    Pulse 84    Temp 98.3 °F (36.8 °C)    Resp 20    Ht 5' 6\" (1.676 m)    Wt 68 kg (150 lb)    SpO2 98%    BMI 24.21 kg/m2        Tmax:  Temp (24hrs), Av.3 °F (37.4 °C), Min:98.3 °F (36.8 °C), Max:101 °F (38.3 °C)      Exam:   Patient is intubated:  no    Physical Examination:   General:  Alert, cooperative, no distress   Head:  Normocephalic, atraumatic. Eyes:  Conjunctivae clear   Neck:        Lungs:   No distress. Chest wall:     Heart:  Regular rate and rhythm   Abdomen:      Extremities: Moves all. Skin:  No rash   Neurologic: CNII-XII intact. Labs:        No lab exists for component: ITNL   No results for input(s): CPK, CKMB, TROIQ in the last 72 hours. Recent Labs      18   0134  18   0853  18   0123   18   1740   NA  138  135*  136   --    --    K  2.5*  3.2*  2.6*   --    --    CL  101  101  101   --    --    CO2  26  24  27   --    --    BUN  14  10  10   --    --    CREA  1.01  0.85  1.08*   < >   --    GLU  147*  115*  126*   --    --    WBC  15.4*   --   15.0*   --    --    HGB  9.2*   --   9.2*   --   8.7*   HCT  28.2*   --   28.4*   --   27.8*   PLT  40*   --   46*   --    --     < > = values in this interval not displayed. No results for input(s): INR, PTP, APTT in the last 72 hours.     No lab exists for component: INREXT  Needs: urine analysis, urine sodium, protein and creatinine  No results found for: ANKUSH, CREAU      Cultures:     No results found for: SDES  Lab Results   Component Value Date/Time    Culture result: LIGHT ESCHERICHIA COLI (A) 07/14/2018 02:04 PM    Culture result: NO ANAEROBES ISOLATED 07/14/2018 02:04 PM    Culture result: NO GROWTH 4 DAYS 07/13/2018 09:36 PM       Radiology:     Medications       Current Facility-Administered Medications   Medication Dose Route Frequency Last Dose    [START ON 7/18/2018] Vancomycin - trough @ 0400 (7/18/18 - Wednesday)   Other ONCE      potassium chloride 10 mEq in 100 ml IVPB  10 mEq IntraVENous ONCE 10 mEq at 07/17/18 1011    potassium chloride (KLOR-CON) packet 20 mEq  20 mEq Oral TID WITH MEALS      vancomycin (VANCOCIN) 1,000 mg in 0.9% sodium chloride (MBP/ADV) 250 mL  1,000 mg IntraVENous Q12H 1,000 mg at 07/17/18 0339    piperacillin-tazobactam (ZOSYN) 3.375 g in 0.9% sodium chloride (MBP/ADV) 100 mL  3.375 g IntraVENous Q8H 3.375 g at 07/17/18 0643    bumetanide (BUMEX) injection 1 mg  1 mg IntraVENous Q12H 1 mg at 07/17/18 0857    ruxolitinib tab 15 mg (Patient Supplied)  15 mg Oral DAILY 15 mg at 07/17/18 0905    aluminum-magnesium hydroxide (MAALOX) oral suspension 30 mL  30 mL Oral QID PRN 30 mL at 07/16/18 2147    promethazine (PHENERGAN) 25 mg in NS IVPB  25 mg IntraVENous Q8H PRN 25 mg at 07/16/18 0919    polyethylene glycol (MIRALAX) packet 17 g  17 g Oral QHS 17 g at 07/15/18 2208    senna-docusate (PERICOLACE) 8.6-50 mg per tablet 1 Tab  1 Tab Oral DAILY 1 Tab at 07/17/18 0857    pantoprazole (PROTONIX) tablet 40 mg  40 mg Oral ACB 40 mg at 07/17/18 0643    oxyCODONE-acetaminophen (PERCOCET) 5-325 mg per tablet 1 Tab  1 Tab Oral Q4H PRN 1 Tab at 07/16/18 0120    HYDROmorphone (DILAUDID) tablet 1 mg  1 mg Oral Q4H PRN      senna (SENOKOT) tablet 17.2 mg  2 Tab Oral QHS 17.2 mg at 07/16/18 0917    sodium chloride (NS) flush 5-10 mL  5-10 mL IntraVENous PRN      sodium chloride (NS) flush 5-10 mL  5-10 mL IntraVENous Q8H 10 mL at 07/17/18 0636    sodium chloride (NS) flush 5-10 mL  5-10 mL IntraVENous PRN      acetaminophen (TYLENOL) tablet 650 mg  650 mg Oral Q4H  mg at 07/17/18 1022    ondansetron (ZOFRAN) injection 4 mg  4 mg IntraVENous Q4H PRN 4 mg at 07/16/18 0645    0.9% sodium chloride infusion 250 mL  250 mL IntraVENous PRN             Case discussed with:      Rajinder Wen DO

## 2018-07-17 NOTE — PROGRESS NOTES
Bedside and Verbal shift change report given to Kathe Stanley RN (oncoming nurse) by Salvatore Ritter RN (offgoing nurse). Report included the following information SBAR, Kardex, ED Summary, Procedure Summary, Intake/Output, Recent Results, Med Rec Status and Cardiac Rhythm NSR.    0915: Pt c/o indigestion, reported at [facility] for dinner. Notified Dr. Lianet Nath of pt status. Dr. Garcia Able Maalox 30 ml prn.     03:05 Notified by Verba Call in Lab of critical Platelet value of 40, and critical Potassium level of 2.5.     0320: Notified Dr. Lianet Nath of pt status. Dr. Garcia Able 3 runs of Potassium 10 mEq.     0340: Pt alert, oriented, c/o generalized stiffness. Administered 650 mg tylenol PO.     05:00 Pt tolerating IV Potassium well. Denies nausea, denies pain. Bedside and Verbal shift change report given to Norma Choudhury RN (oncoming nurse) by Kathe Stanley RN (offgoing nurse). Report included the following information SBAR, Kardex, ED Summary, Procedure Summary, Intake/Output, Recent Results, Med Rec Status and Cardiac Rhythm NSR with ST depression.

## 2018-07-17 NOTE — PROGRESS NOTES
Daily Progress Note: 7/17/2018  Maria Guadalupe Ramírez MD    Assessment/Plan:   Sepsis POA due to below:Fever (7/13/2018)/  Leukocytosis (7/14/2018)/  SIRS (systemic inflammatory response syndrome) ([facility] (7/14/2018): in the setting of immunosuppression.   --broad spectrum abx     Rhona-rectal abscess (7/14/2018): left side. Likely source of fever. IV antibiotics as above. S/p I+D on 7/14; wound cultures pending  --per surgery  -- pelvic CT did not show pelvic abscess     Myelofibrosis ([facility] (7/14/2018)/ Anemia/Thrombocytopenia ([facility] (7/14/2018): follows up at [facility]. Hgb up to 8s after 2 units of PRBCs 7/14  --heme following     Hypokalemia (7/14/2018): likely from vomiting. Replete and follow K+     Nausea and vomiting (7/14/2018): likely from infection as above. Hydrate. IV anti-emetics. Monitor     Code Status:  Full     Problem List:  Problem List as of 7/17/2018  Date Reviewed: 7/14/2018          Codes Class Noted - Resolved    Myelofibrosis ([facility] ICD-10-CM: D75.81  ICD-9-CM: 289.83  7/14/2018 - Present        Thrombocytopenia ([facility] ICD-10-CM: D69.6  ICD-9-CM: 287.5  7/14/2018 - Present        Hypokalemia ICD-10-CM: E87.6  ICD-9-CM: 276.8  7/14/2018 - Present        Leukocytosis ICD-10-CM: J39.341  ICD-9-CM: 288.60  7/14/2018 - Present        Nausea and vomiting ICD-10-CM: R11.2  ICD-9-CM: 787.01  7/14/2018 - Present        SIRS (systemic inflammatory response syndrome) ([facility] ICD-10-CM: R65.10  ICD-9-CM: 995.90  7/14/2018 - Present        Rhona-rectal abscess ICD-10-CM: K61.1  ICD-9-CM: 398  7/14/2018 - Present        * (Principal)Fever ICD-10-CM: R50.9  ICD-9-CM: 780.60  7/13/2018 - Present              Subjective:   Ms. Lisa Rankin is a 75 y.o. female who is being admitted for Fever. Ms. Lisa Rankin presented to our Emergency Department today complaining of intermittent fever and chills associated with generalized weakness.  She has also been having nausea and vomiting but denies any headaches, flu like or respiratory symptoms, No abdominal discomfort or diarrhea. She has not has any urinary symptoms. No overt focal symptoms. She does have a Hx of myelofibrosis and follows up at [facility]. Work up thus far has been unremarkable. In light of her immunosuppression, she will be admitted for further management. [Dr Hagen Cassette     : pt found to have perirectal abscess. Afebrile since 5AM.  Still nauseated. NPO for I+D in OR today with Dr Oswaldo Lazcano. 7/15: continues to be nauseated. Labs pending this AM.  Surgeon doesn't feel it is much better despite I+D yesterday. Pt says pain is better controlled with percocet. 18: K+ 2.6. Repleting with IV. (6 total) WBC a little better. Tmax 98.8.     :  Pt reports she is feeling somewhat better. Dr Doyle Aguirre from ([address] [place] [address]) called last night (see note from last night) and wanted Mario 2 inhibitor restarted at 1/2 dose and weaned off from there. WBC remains 15K. K+ back down - replacing. Tmax 101.9. Tnow 98.6. Diuresed about a liter overnight. Checking Mag also. Review of Systems:   A comprehensive review of systems was negative except for that written in the HPI. Objective:   Physical Exam:     Visit Vitals    /66 (BP 1 Location: Left arm, BP Patient Position: At rest;Supine; Head of bed elevated (Comment degrees))    Pulse 75    Temp 98.6 °F (37 °C)    Resp 18    Ht 5' 6\" (1.676 m)    Wt 68 kg (150 lb)    SpO2 96%    BMI 24.21 kg/m2    O2 Flow Rate (L/min): 2 l/min O2 Device: Nasal cannula    Temp (24hrs), Av.8 °F (37.7 °C), Min:98.6 °F (37 °C), Max:101.9 °F (38.8 °C)    1901 -  0700  In: 600 [P.O.:150; I.V.:450]  Out: 2100 [Urine:2100]   07/15 0701 - 1900  In: 4334.6 [P.O.:1770; I.V.:2564.6]  Out: 0690 [Urine:5875]    General:  Alert, cooperative, no distress, appears stated age, nauseated appearing   Head:  Normocephalic, without obvious abnormality, atraumatic. Eyes:  Conjunctivae/corneas clear. PERRL, EOMs intact. Nose: Nares normal. Septum midline. Throat: Lips, mucosa, and tongue dry   Neck: Supple, symmetrical, trachea midline, no adenopathy, thyroid: no enlargement/tenderness/nodules, no carotid bruit and no JVD. Back:   Symmetric, no curvature. ROM normal. No CVA tenderness. Lungs:   A few basilar crackles bilaterally. Chest wall:  No tenderness or deformity. Heart:  Regular rate and rhythm, S1, S2 normal, 1/9 ASHA, click, rub or gallop. Abdomen:   Soft, non-tender. Bowel sounds normal. No masses,  No organomegaly. : corcoran in place, L>R labia indurated and red, L glut and perirectal area dressed   Extremities: Extremities normal, atraumatic, no cyanosis or edema. No calf tenderness or cords. Pulses: 2+ and symmetric all extremities. Skin: Skin color, texture, turgor normal. No rashes or lesions   Neurologic: CNII-XII intact. Alert and oriented X 3. Fine motor of hands and fingers normal.   equal.  Gait not tested at this time. Sensation grossly normal to touch. Gross motor of extremities normal.       Data Review:       Recent Days:  Recent Labs      07/17/18   0134  07/16/18   0123  07/14/18   1740   WBC  15.4*  15.0*   --    HGB  9.2*  9.2*  8.7*   HCT  28.2*  28.4*  27.8*   PLT  40*  46*   --      Recent Labs      07/17/18   0134  07/16/18   0853  07/16/18   0123   NA  138  135*  136   K  2.5*  3.2*  2.6*   CL  101  101  101   CO2  26  24  27   GLU  147*  115*  126*   BUN  14  10  10   CREA  1.01  0.85  1.08*   CA  7.9*  7.6*  7.8*     No results for input(s): PH, PCO2, PO2, HCO3, FIO2 in the last 72 hours.     24 Hour Results:  Recent Results (from the past 24 hour(s))   METABOLIC PANEL, BASIC    Collection Time: 07/16/18  8:53 AM   Result Value Ref Range    Sodium 135 (L) 136 - 145 mmol/L    Potassium 3.2 (L) 3.5 - 5.1 mmol/L    Chloride 101 97 - 108 mmol/L    CO2 24 21 - 32 mmol/L    Anion gap 10 5 - 15 mmol/L    Glucose 115 (H) 65 - 100 mg/dL    BUN 10 6 - 20 MG/DL    Creatinine 0.85 0.55 - 1.02 MG/DL    BUN/Creatinine ratio 12 12 - 20      GFR est AA >60 >60 ml/min/1.73m2    GFR est non-AA >60 >60 ml/min/1.73m2    Calcium 7.6 (L) 8.5 - 10.1 MG/DL   CBC WITH AUTOMATED DIFF    Collection Time: 07/17/18  1:34 AM   Result Value Ref Range    WBC 15.4 (H) 3.6 - 11.0 K/uL    RBC 3.07 (L) 3.80 - 5.20 M/uL    HGB 9.2 (L) 11.5 - 16.0 g/dL    HCT 28.2 (L) 35.0 - 47.0 %    MCV 91.9 80.0 - 99.0 FL    MCH 30.0 26.0 - 34.0 PG    MCHC 32.6 30.0 - 36.5 g/dL    RDW 19.8 (H) 11.5 - 14.5 %    PLATELET 40 (LL) 021 - 400 K/uL    NRBC 10.0 (H) 0  WBC    ABSOLUTE NRBC 1.54 (H) 0.00 - 0.01 K/uL    NEUTROPHILS 45 32 - 75 %    BAND NEUTROPHILS 7 (H) 0 - 6 %    LYMPHOCYTES 10 (L) 12 - 49 %    MONOCYTES 30 (H) 5 - 13 %    EOSINOPHILS 0 0 - 7 %    BASOPHILS 1 0 - 1 %    METAMYELOCYTES 3 (H) 0 %    MYELOCYTES 4 (H) 0 %    IMMATURE GRANULOCYTES 0 %    ABS. NEUTROPHILS 8.0 1.8 - 8.0 K/UL    ABS. LYMPHOCYTES 1.5 0.8 - 3.5 K/UL    ABS. MONOCYTES 4.6 (H) 0.0 - 1.0 K/UL    ABS. EOSINOPHILS 0.0 0.0 - 0.4 K/UL    ABS. BASOPHILS 0.2 (H) 0.0 - 0.1 K/UL    ABS. IMM.  GRANS. 0.0 K/UL    DF MANUAL      RBC COMMENTS STOMATOCYTES  1+        RBC COMMENTS ANISOCYTOSIS  2+       METABOLIC PANEL, BASIC    Collection Time: 07/17/18  1:34 AM   Result Value Ref Range    Sodium 138 136 - 145 mmol/L    Potassium 2.5 (LL) 3.5 - 5.1 mmol/L    Chloride 101 97 - 108 mmol/L    CO2 26 21 - 32 mmol/L    Anion gap 11 5 - 15 mmol/L    Glucose 147 (H) 65 - 100 mg/dL    BUN 14 6 - 20 MG/DL    Creatinine 1.01 0.55 - 1.02 MG/DL    BUN/Creatinine ratio 14 12 - 20      GFR est AA >60 >60 ml/min/1.73m2    GFR est non-AA 54 (L) >60 ml/min/1.73m2    Calcium 7.9 (L) 8.5 - 10.1 MG/DL   NT-PRO BNP    Collection Time: 07/17/18  1:34 AM   Result Value Ref Range    NT pro-BNP 1942 (H) 0 - 125 PG/ML       Medications reviewed  Current Facility-Administered Medications   Medication Dose Route Frequency    potassium chloride 10 mEq in 100 ml IVPB  10 mEq IntraVENous Q1H    vancomycin (VANCOCIN) 1,000 mg in 0.9% sodium chloride (MBP/ADV) 250 mL  1,000 mg IntraVENous Q12H    piperacillin-tazobactam (ZOSYN) 3.375 g in 0.9% sodium chloride (MBP/ADV) 100 mL  3.375 g IntraVENous Q8H    bumetanide (BUMEX) injection 1 mg  1 mg IntraVENous Q12H    ruxolitinib tab 15 mg (Patient Supplied)  15 mg Oral DAILY    aluminum-magnesium hydroxide (MAALOX) oral suspension 30 mL  30 mL Oral QID PRN    promethazine (PHENERGAN) 25 mg in NS IVPB  25 mg IntraVENous Q8H PRN    polyethylene glycol (MIRALAX) packet 17 g  17 g Oral QHS    senna-docusate (PERICOLACE) 8.6-50 mg per tablet 1 Tab  1 Tab Oral DAILY    pantoprazole (PROTONIX) tablet 40 mg  40 mg Oral ACB    oxyCODONE-acetaminophen (PERCOCET) 5-325 mg per tablet 1 Tab  1 Tab Oral Q4H PRN    HYDROmorphone (DILAUDID) tablet 1 mg  1 mg Oral Q4H PRN    senna (SENOKOT) tablet 17.2 mg  2 Tab Oral QHS    sodium chloride (NS) flush 5-10 mL  5-10 mL IntraVENous PRN    sodium chloride (NS) flush 5-10 mL  5-10 mL IntraVENous Q8H    sodium chloride (NS) flush 5-10 mL  5-10 mL IntraVENous PRN    acetaminophen (TYLENOL) tablet 650 mg  650 mg Oral Q4H PRN    ondansetron (ZOFRAN) injection 4 mg  4 mg IntraVENous Q4H PRN    0.9% sodium chloride infusion 250 mL  250 mL IntraVENous PRN       Care Plan discussed with: Patient and Nurse    Total time spent with patient: 30 minutes.     Jones Senior MD

## 2018-07-17 NOTE — PROGRESS NOTES
8pm:Dr Jose A Wilkerson just called and wants her weaned off her Mario 2 inhib over this wk as she is concerned with withdrawal effect that can mimic sepsis - she wants her on 1/2 dose over next few days and grad weaned off from there

## 2018-07-17 NOTE — PROGRESS NOTES
Progress Note    Patient: Marge Mims MRN: [id number]  SSN: [id number]    YOB: 1945  Age: 75 y.o. Sex: female      Admit Date: 2018    3 Days Post-Op    Procedure:  Procedure(s):  INCISION AND DRAINAGE, EXCISIONAL DEBRIDEMENT OF PERIRECTAL ABSCESS    Subjective:     Mrs. Kevin Can is feeling better. She denies any nausea. Her breathing is better. Objective:     Visit Vitals    /60    Pulse 96    Temp 98.6 °F (37 °C)    Resp 18    Ht 5' 6\" (1.676 m)    Wt 150 lb (68 kg)    SpO2 96%    BMI 24.21 kg/m2       Temp (24hrs), Av.5 °F (37.5 °C), Min:98.6 °F (37 °C), Max:101 °F (38.3 °C)      Physical Exam:    GENERAL: alert, cooperative, no distress    Data Review: reviewed  cultures.     Lab Review:   BMP:   Lab Results   Component Value Date/Time     2018 01:34 AM    K 2.5 (LL) 2018 01:34 AM     2018 01:34 AM    CO2 26 2018 01:34 AM    AGAP 11 2018 01:34 AM     (H) 2018 01:34 AM    BUN 14 2018 01:34 AM    CREA 1.01 2018 01:34 AM    GFRAA >60 2018 01:34 AM    GFRNA 54 (L) 2018 01:34 AM     CBC:   Lab Results   Component Value Date/Time    WBC 15.4 (H) 2018 01:34 AM    HGB 9.2 (L) 2018 01:34 AM    HCT 28.2 (L) 2018 01:34 AM    PLT 40 (LL) 2018 01:34 AM       Assessment:     Hospital Problems  Date Reviewed: 2018          Codes Class Noted POA    Myelofibrosis ([facility] ICD-10-CM: P79.93  ICD-9-CM: 289.83  2018 Yes        Thrombocytopenia ([facility] ICD-10-CM: D69.6  ICD-9-CM: 287.5  2018 Yes        Hypokalemia ICD-10-CM: E87.6  ICD-9-CM: 276.8  2018 Yes        Leukocytosis ICD-10-CM: R90.687  ICD-9-CM: 288.60  2018 Yes        Nausea and vomiting ICD-10-CM: R11.2  ICD-9-CM: 787.01  2018 Yes        SIRS (systemic inflammatory response syndrome) ([facility]) ICD-10-CM: R65.10  ICD-9-CM: 995.90  2018 Yes        Rhona-rectal abscess ICD-10-CM: K61.1  ICD-9-CM: 257 7/14/2018 Yes        * (Principal)Fever ICD-10-CM: R50.9  ICD-9-CM: 780.60  7/13/2018 Yes              Plan/Recommendations/Medical Decision Making:     No fever and feeling better. OR cultures grew E. Coli. Continue Zosyn and LWC. Appreciate ID input. Await Path.     Signed By: Jamila Morataya MD     July 17, 2018

## 2018-07-18 ENCOUNTER — APPOINTMENT (OUTPATIENT)
Dept: GENERAL RADIOLOGY | Age: 73
DRG: 854 | End: 2018-07-18
Attending: SPECIALIST
Payer: MEDICARE

## 2018-07-18 LAB
ABO + RH BLD: NORMAL
ALBUMIN SERPL-MCNC: 2.3 G/DL (ref 3.5–5)
ALBUMIN/GLOB SERPL: 0.6 {RATIO} (ref 1.1–2.2)
ALP SERPL-CCNC: 156 U/L (ref 45–117)
ALT SERPL-CCNC: 59 U/L (ref 12–78)
ANION GAP SERPL CALC-SCNC: 6 MMOL/L (ref 5–15)
ANTIGENS PRESENT BLD: NORMAL
ANTIGENS PRESENT RBC DONR: NORMAL
AST SERPL-CCNC: 97 U/L (ref 15–37)
BASOPHILS # BLD: 0.2 K/UL (ref 0–0.1)
BASOPHILS NFR BLD: 1 % (ref 0–1)
BILIRUB SERPL-MCNC: 1.2 MG/DL (ref 0.2–1)
BLD PROD TYP BPU: NORMAL
BLOOD GROUP ANTIBODIES SERPL: NORMAL
BLOOD GROUP ANTIBODIES SERPL: NORMAL
BPU ID: NORMAL
BUN SERPL-MCNC: 20 MG/DL (ref 6–20)
BUN/CREAT SERPL: 20 (ref 12–20)
CALCIUM SERPL-MCNC: 8.1 MG/DL (ref 8.5–10.1)
CHLORIDE SERPL-SCNC: 102 MMOL/L (ref 97–108)
CO2 SERPL-SCNC: 32 MMOL/L (ref 21–32)
CREAT SERPL-MCNC: 1.02 MG/DL (ref 0.55–1.02)
CROSSMATCH RESULT,%XM: NORMAL
DATE LAST DOSE: ABNORMAL
DIFFERENTIAL METHOD BLD: ABNORMAL
EOSINOPHIL # BLD: 0 K/UL (ref 0–0.4)
EOSINOPHIL NFR BLD: 0 % (ref 0–7)
ERYTHROCYTE [DISTWIDTH] IN BLOOD BY AUTOMATED COUNT: 20.4 % (ref 11.5–14.5)
GLOBULIN SER CALC-MCNC: 4 G/DL (ref 2–4)
GLUCOSE SERPL-MCNC: 106 MG/DL (ref 65–100)
HCT VFR BLD AUTO: 29.1 % (ref 35–47)
HGB BLD-MCNC: 9.4 G/DL (ref 11.5–16)
IMM GRANULOCYTES # BLD: 0 K/UL
IMM GRANULOCYTES NFR BLD AUTO: 0 %
LYMPHOCYTES # BLD: 0.8 K/UL (ref 0.8–3.5)
LYMPHOCYTES NFR BLD: 5 % (ref 12–49)
MAGNESIUM SERPL-MCNC: 1.9 MG/DL (ref 1.6–2.4)
MCH RBC QN AUTO: 30 PG (ref 26–34)
MCHC RBC AUTO-ENTMCNC: 32.3 G/DL (ref 30–36.5)
MCV RBC AUTO: 93 FL (ref 80–99)
METAMYELOCYTES NFR BLD MANUAL: 3 %
MONOCYTES # BLD: 5.7 K/UL (ref 0–1)
MONOCYTES NFR BLD: 37 % (ref 5–13)
NEUTS BAND NFR BLD MANUAL: 3 % (ref 0–6)
NEUTS SEG # BLD: 8 K/UL (ref 1.8–8)
NEUTS SEG NFR BLD: 49 % (ref 32–75)
NRBC # BLD: 1.29 K/UL (ref 0–0.01)
NRBC BLD-RTO: 8.4 PER 100 WBC
PLATELET # BLD AUTO: 41 K/UL (ref 150–400)
PMV BLD AUTO: ABNORMAL FL (ref 8.9–12.9)
POTASSIUM SERPL-SCNC: 3.1 MMOL/L (ref 3.5–5.1)
PROMYELOCYTES NFR BLD MANUAL: 2 %
PROT SERPL-MCNC: 6.3 G/DL (ref 6.4–8.2)
RBC # BLD AUTO: 3.13 M/UL (ref 3.8–5.2)
RBC MORPH BLD: ABNORMAL
RBC MORPH BLD: ABNORMAL
REPORTED DOSE,DOSE: ABNORMAL UNITS
REPORTED DOSE/TIME,TMG: ABNORMAL
SODIUM SERPL-SCNC: 140 MMOL/L (ref 136–145)
SPECIMEN EXP DATE BLD: NORMAL
STATUS OF UNIT,%ST: NORMAL
UNIT DIVISION, %UDIV: 0
VANCOMYCIN TROUGH SERPL-MCNC: 15.4 UG/ML (ref 5–10)
WBC # BLD AUTO: 15.3 K/UL (ref 3.6–11)

## 2018-07-18 PROCEDURE — 74011250637 HC RX REV CODE- 250/637: Performed by: FAMILY MEDICINE

## 2018-07-18 PROCEDURE — 77030019895 HC PCKNG STRP IODO -A

## 2018-07-18 PROCEDURE — 77010033678 HC OXYGEN DAILY

## 2018-07-18 PROCEDURE — 36415 COLL VENOUS BLD VENIPUNCTURE: CPT | Performed by: FAMILY MEDICINE

## 2018-07-18 PROCEDURE — 74011000258 HC RX REV CODE- 258: Performed by: INTERNAL MEDICINE

## 2018-07-18 PROCEDURE — 74011250636 HC RX REV CODE- 250/636: Performed by: FAMILY MEDICINE

## 2018-07-18 PROCEDURE — 83880 ASSAY OF NATRIURETIC PEPTIDE: CPT | Performed by: FAMILY MEDICINE

## 2018-07-18 PROCEDURE — 74011250636 HC RX REV CODE- 250/636: Performed by: INTERNAL MEDICINE

## 2018-07-18 PROCEDURE — 83735 ASSAY OF MAGNESIUM: CPT | Performed by: FAMILY MEDICINE

## 2018-07-18 PROCEDURE — 65660000000 HC RM CCU STEPDOWN

## 2018-07-18 PROCEDURE — 94760 N-INVAS EAR/PLS OXIMETRY 1: CPT

## 2018-07-18 PROCEDURE — 85025 COMPLETE CBC W/AUTO DIFF WBC: CPT | Performed by: FAMILY MEDICINE

## 2018-07-18 PROCEDURE — 74011250637 HC RX REV CODE- 250/637: Performed by: INTERNAL MEDICINE

## 2018-07-18 PROCEDURE — 71046 X-RAY EXAM CHEST 2 VIEWS: CPT

## 2018-07-18 PROCEDURE — 80202 ASSAY OF VANCOMYCIN: CPT | Performed by: INTERNAL MEDICINE

## 2018-07-18 PROCEDURE — 80053 COMPREHEN METABOLIC PANEL: CPT | Performed by: FAMILY MEDICINE

## 2018-07-18 RX ORDER — POTASSIUM CHLORIDE 7.45 MG/ML
10 INJECTION INTRAVENOUS
Status: COMPLETED | OUTPATIENT
Start: 2018-07-18 | End: 2018-07-28

## 2018-07-18 RX ORDER — SENNOSIDES 8.6 MG/1
5 TABLET ORAL
Status: DISCONTINUED | OUTPATIENT
Start: 2018-07-18 | End: 2018-07-31 | Stop reason: HOSPADM

## 2018-07-18 RX ADMIN — Medication 10 ML: at 22:31

## 2018-07-18 RX ADMIN — POTASSIUM CHLORIDE 20 MEQ: 1.5 POWDER, FOR SOLUTION ORAL at 07:27

## 2018-07-18 RX ADMIN — ACETAMINOPHEN 650 MG: 325 TABLET ORAL at 22:30

## 2018-07-18 RX ADMIN — POTASSIUM CHLORIDE 10 MEQ: 10 INJECTION, SOLUTION INTRAVENOUS at 10:19

## 2018-07-18 RX ADMIN — PIPERACILLIN SODIUM,TAZOBACTAM SODIUM 3.38 G: 3; .375 INJECTION, POWDER, FOR SOLUTION INTRAVENOUS at 14:30

## 2018-07-18 RX ADMIN — POTASSIUM CHLORIDE 20 MEQ: 1.5 POWDER, FOR SOLUTION ORAL at 11:52

## 2018-07-18 RX ADMIN — POTASSIUM CHLORIDE 10 MEQ: 10 INJECTION, SOLUTION INTRAVENOUS at 11:52

## 2018-07-18 RX ADMIN — PANTOPRAZOLE SODIUM 40 MG: 40 TABLET, DELAYED RELEASE ORAL at 07:27

## 2018-07-18 RX ADMIN — POLYETHYLENE GLYCOL 3350 17 G: 17 POWDER, FOR SOLUTION ORAL at 08:24

## 2018-07-18 RX ADMIN — VANCOMYCIN HYDROCHLORIDE 1000 MG: 1 INJECTION, POWDER, LYOPHILIZED, FOR SOLUTION INTRAVENOUS at 04:48

## 2018-07-18 RX ADMIN — SENNOSIDES 43 MG: 8.6 TABLET, FILM COATED ORAL at 22:30

## 2018-07-18 RX ADMIN — PIPERACILLIN SODIUM,TAZOBACTAM SODIUM 3.38 G: 3; .375 INJECTION, POWDER, FOR SOLUTION INTRAVENOUS at 07:27

## 2018-07-18 RX ADMIN — POTASSIUM CHLORIDE 20 MEQ: 1.5 POWDER, FOR SOLUTION ORAL at 16:13

## 2018-07-18 RX ADMIN — STANDARDIZED SENNA CONCENTRATE AND DOCUSATE SODIUM 1 TABLET: 8.6; 5 TABLET, FILM COATED ORAL at 08:24

## 2018-07-18 RX ADMIN — Medication 10 ML: at 07:28

## 2018-07-18 RX ADMIN — PIPERACILLIN SODIUM,TAZOBACTAM SODIUM 3.38 G: 3; .375 INJECTION, POWDER, FOR SOLUTION INTRAVENOUS at 22:30

## 2018-07-18 RX ADMIN — POTASSIUM CHLORIDE 10 MEQ: 10 INJECTION, SOLUTION INTRAVENOUS at 14:30

## 2018-07-18 RX ADMIN — POTASSIUM CHLORIDE 10 MEQ: 10 INJECTION, SOLUTION INTRAVENOUS at 13:22

## 2018-07-18 RX ADMIN — VANCOMYCIN HYDROCHLORIDE 1000 MG: 1 INJECTION, POWDER, LYOPHILIZED, FOR SOLUTION INTRAVENOUS at 16:13

## 2018-07-18 NOTE — PROGRESS NOTES
Cardiology Progress Note [address] [facility] [address], [place], [address] [facility] Phone [phone]; Fax [phone] 2018 10:12 AM  
 
Admit Date:           2018 Admit Diagnosis:  Fever PERIRECTAL ABSCESS 
:          1945 MRN:          [id number] ASSESSMENT/RECOMMENDATION:  
1)Pulm Edema - CXR on admission - right sided airspace disease asymmetric pulmonary edema versus pneumonia -- given fevers (afebrile today), suspect infectious process may be playing some role. Repeat chest Xray today - Diuresed well, breathing better - proBNP level was elevated (can be non-specific at times), recheck BNP in AM 
- echo shows normal LVEF with mild mR, reviewed results with patient 
- do not think was related to heat failure. No further cardiac testing needed at this time. Hypokalemia: replete in process CARDIOLOGY ATTENDING Patient personally seen and examined. All the elements of history and examination were personally performed. Assessment and plan was discussed and agree as written above Echo showed normal LVEF and grade 1 diastology (normal for age). Her symptoms are much better after diuretics and CXR with almost complete resolution of pulm edema. Suspect edema could have been at least in part iatrogenic in nature (IVF, transfusions). Going forward would watch volume status and salt intake. Can use diuretics PRN. Will see Ms. Ramos as needed. Please call if any questions. Mariposa Saleh MD, [facility]  
  
  
 
 
 
 
 
 08 - 1900 In: 100 [I.V.:100] Out: 750 [Urine:750] Last 3 Recorded Weights in this Encounter  
 18 0504 18 0330 18 7920 Weight: 151 lb (68.5 kg) 150 lb (68 kg) 156 lb (70.8 kg) 1901 -  0700 In: 2510 [P.O.:1610; I.V.:900] Out: 9214 [JRTYW:[id number]] SUBJECTIVE [facility] denies palpitations, irregular heart beat, SOB, chest pain or LE edema. No lightheadedness or dizziness Feels better Current Facility-Administered Medications Medication Dose Route Frequency  potassium chloride 10 mEq in 100 ml IVPB  10 mEq IntraVENous Q1H  
 potassium chloride (KLOR-CON) packet 20 mEq  20 mEq Oral TID WITH MEALS  polyethylene glycol (MIRALAX) packet 17 g  17 g Oral DAILY  vancomycin (VANCOCIN) 1,000 mg in 0.9% sodium chloride (MBP/ADV) 250 mL  1,000 mg IntraVENous Q12H  piperacillin-tazobactam (ZOSYN) 3.375 g in 0.9% sodium chloride (MBP/ADV) 100 mL  3.375 g IntraVENous Q8H  
 ruxolitinib tab 15 mg (Patient Supplied)  15 mg Oral DAILY  aluminum-magnesium hydroxide (MAALOX) oral suspension 30 mL  30 mL Oral QID PRN  promethazine (PHENERGAN) 25 mg in NS IVPB  25 mg IntraVENous Q8H PRN  
 senna-docusate (PERICOLACE) 8.6-50 mg per tablet 1 Tab  1 Tab Oral DAILY  pantoprazole (PROTONIX) tablet 40 mg  40 mg Oral ACB  oxyCODONE-acetaminophen (PERCOCET) 5-325 mg per tablet 1 Tab  1 Tab Oral Q4H PRN  
 HYDROmorphone (DILAUDID) tablet 1 mg  1 mg Oral Q4H PRN  
 senna (SENOKOT) tablet 17.2 mg  2 Tab Oral QHS  sodium chloride (NS) flush 5-10 mL  5-10 mL IntraVENous PRN  
 sodium chloride (NS) flush 5-10 mL  5-10 mL IntraVENous Q8H  
 sodium chloride (NS) flush 5-10 mL  5-10 mL IntraVENous PRN  
 acetaminophen (TYLENOL) tablet 650 mg  650 mg Oral Q4H PRN  
 ondansetron (ZOFRAN) injection 4 mg  4 mg IntraVENous Q4H PRN  
 0.9% sodium chloride infusion 250 mL  250 mL IntraVENous PRN OBJECTIVE Intake/Output Summary (Last 24 hours) at 07/18/18 1012 Last data filed at 07/18/18 1534 Gross per 24 hour Intake             1280 ml Output             5075 ml Net            -3795 ml Review of Systems - History obtained from the patient AS PER  HPI Telemetry NSR 
 
PHYSICAL EXAM  
 Visit Vitals  /64 (BP 1 Location: Left arm, BP Patient Position: At rest)  Pulse 73  Temp 98.7 °F (37.1 °C)  Resp 18  Ht 5' 6\" (1.676 m)  Wt 156 lb (70.8 kg)  SpO2 96%  BMI 25.18 kg/m2 Gen: Well-developed, well-nourished, in no acute distress  alert and oriented x 3 HEENT:  Pink conjunctivae, Hearing grossly normal.No scleral icterus or conjunctival, moist mucous membranes Neck: Supple,No JVD Resp: No accessory muscle use, Clear breath sounds, No rales or rhonchi 
Card: Regular Rate,Rythm, 2/6 murmurs at LUSB, no rubs or gallop. No thrills. GI:          soft, non-tender MSK: No cyanosis or clubbing, good capillary refill Skin: No rashes or ulcers, no bruising Neuro:  Cranial nerves are grossly intact, moving all four extremities, no focal deficit, follows commands appropriately Psych:  Good insight, oriented to person, place and time, alert, Nml Affect LE: No edema DATA REVIEW Laboratory and Imaging have been reviewed by me and are notable for No results for input(s): CPK, CKMB, TROIQ in the last 72 hours. Recent Labs  
   07/18/18 
 0154  07/17/18 
 0134  07/16/18 
 9099  07/16/18 
 0123 NA  140  138  135*  136  
K  3.1*  2.5*  3.2*  2.6*  
CO2  32  26  24  27 BUN  20  14  10  10 CREA  1.02  1.01  0.85  1.08* GLU  106*  147*  115*  126* MG  1.9  2.0   --    --   
WBC  15.3*  15.4*   --   15.0* HGB  9.4*  9.2*   --   9.2* HCT  29.1*  28.2*   --   28.4*  
PLT  41*  40*   --   46* Wai Segovia NP

## 2018-07-18 NOTE — PROGRESS NOTES
[facility] [place] at [address], [address] W: [phone]  F: [phone] Reason for Visit:  
Edil Jones is a 75 y.o. female who is seen in consultation at the request of Dr. Luciano Parker for evaluation of Myelofibrosis. History of Present Illness:  
Patient is a 75 y. o. with PMF who is admitted on 7/14/18 with c/o weakness, N/V and intermittent fevers x 5 days. She has a known h/o UTIs. Noted to have anemia, thrombocytopenia and leucocytosis on admission. CXR and UA unremarkable. She was started on Levaquin and Vancomycin and underwent I & D for a perirectal abscess on 7/14/18. She states that she underwent a BMT in 2013 for MF but relapsed soon after. Has since been on Jakafi and follows with Hematology at [facility]. She has also been on Exjade for transfusion iron overload. Does not think she has an enlarged spleen. She relocated from [facility] last year but was in the process of moving back later in this week. In the last 3-4 days she noted weakness, confusion, intermittent fevers and urinary incontinence. 1 Day prior to presentation she thought she felt a swelling in her rectal area. Hence she presented to the ER when she was found to have a temp of 102 F Since the I and D she has had a Tmax of 100F. Has better alertness, still has pain in the perirectal area, has no chills or bleeding. Notes some nausea x 1 day. No CP, SOB, Cough, dysuria. Has not had a BM since 3 days but has no abdominal discomfort Interval History:  
Feeling better each day; appetite good; drinking warm prune juice to assist in having a BM. Concerned that once discharged she would be going to new home in [place], near [place] should she need HH.wants to make sure everyone is aware. Follows with Dr Shashi Leonard/oncologist at [facility].([phone]); requesting call be made to inform her of admission Past Medical History:  
Diagnosis Date  Myelofibrosis ([facility] [address] [facility] BMT in 2013 for MF but relapsed soon after. Has since been on Jakafi and follows with Hematology at [facility] Past Surgical History:  
Procedure Laterality Date  HX BONE MARROW TRANSPLANT  HX VASCULAR ACCESS    
 right portacath Social History Substance Use Topics  Smoking status: Never Smoker  Smokeless tobacco: Never Used  Alcohol use Yes Comment: seldom Family History Problem Relation Age of Onset  Heart Attack Mother Current Facility-Administered Medications Medication Dose Route Frequency  potassium chloride 10 mEq in 100 ml IVPB  10 mEq IntraVENous Q1H  
 potassium chloride (KLOR-CON) packet 20 mEq  20 mEq Oral TID WITH MEALS  polyethylene glycol (MIRALAX) packet 17 g  17 g Oral DAILY  vancomycin (VANCOCIN) 1,000 mg in 0.9% sodium chloride (MBP/ADV) 250 mL  1,000 mg IntraVENous Q12H  piperacillin-tazobactam (ZOSYN) 3.375 g in 0.9% sodium chloride (MBP/ADV) 100 mL  3.375 g IntraVENous Q8H  
 ruxolitinib tab 15 mg (Patient Supplied)  15 mg Oral DAILY  aluminum-magnesium hydroxide (MAALOX) oral suspension 30 mL  30 mL Oral QID PRN  promethazine (PHENERGAN) 25 mg in NS IVPB  25 mg IntraVENous Q8H PRN  
 senna-docusate (PERICOLACE) 8.6-50 mg per tablet 1 Tab  1 Tab Oral DAILY  pantoprazole (PROTONIX) tablet 40 mg  40 mg Oral ACB  oxyCODONE-acetaminophen (PERCOCET) 5-325 mg per tablet 1 Tab  1 Tab Oral Q4H PRN  
 HYDROmorphone (DILAUDID) tablet 1 mg  1 mg Oral Q4H PRN  
 senna (SENOKOT) tablet 17.2 mg  2 Tab Oral QHS  sodium chloride (NS) flush 5-10 mL  5-10 mL IntraVENous PRN  
 sodium chloride (NS) flush 5-10 mL  5-10 mL IntraVENous Q8H  
 sodium chloride (NS) flush 5-10 mL  5-10 mL IntraVENous PRN  
 acetaminophen (TYLENOL) tablet 650 mg  650 mg Oral Q4H PRN  
 ondansetron (ZOFRAN) injection 4 mg  4 mg IntraVENous Q4H PRN  
 0.9% sodium chloride infusion 250 mL  250 mL IntraVENous PRN No Known Allergies Review of Systems: A complete review of systems was obtained, negative except as described above. Physical Exam:  
 
Visit Vitals  /64 (BP 1 Location: Left arm, BP Patient Position: At rest)  Pulse 73  Temp 98.7 °F (37.1 °C)  Resp 18  Ht 5' 6\" (1.676 m)  Wt 70.8 kg (156 lb)  SpO2 96%  BMI 25.18 kg/m2 ECOG PS: 2 General: Nauseous but otherwise NAD Eyes: PERRLA, anicteric sclerae HENT: Atraumatic, OP clear Neck: Supple Respiratory: CTAB, normal respiratory effort CV: tachyrate, regular rhythm, no murmurs, no peripheral edema GI: Soft, nontender, nondistended, no masses, no hepatomegaly, no splenomegaly MS:  Digits without clubbing or cyanosis. Skin: No rashes, ecchymoses, or petechiae. Normal temperature, turgor, and texture. Psych: Alert, oriented, appropriate affect, normal judgment/insight External genitalia Erythematous vulva. Packing noted Results:  
 
Lab Results Component Value Date/Time WBC 15.3 (H) 07/18/2018 01:54 AM  
 HGB 9.4 (L) 07/18/2018 01:54 AM  
 HCT 29.1 (L) 07/18/2018 01:54 AM  
 PLATELET 41 (LL) 50/44/1830 01:54 AM  
 MCV 93.0 07/18/2018 01:54 AM  
 ABS. NEUTROPHILS 8.0 07/18/2018 01:54 AM  
 
Lab Results Component Value Date/Time Sodium 140 07/18/2018 01:54 AM  
 Potassium 3.1 (L) 07/18/2018 01:54 AM  
 Chloride 102 07/18/2018 01:54 AM  
 CO2 32 07/18/2018 01:54 AM  
 Glucose 106 (H) 07/18/2018 01:54 AM  
 BUN 20 07/18/2018 01:54 AM  
 Creatinine 1.02 07/18/2018 01:54 AM  
 GFR est AA >60 07/18/2018 01:54 AM  
 GFR est non-AA 53 (L) 07/18/2018 01:54 AM  
 Calcium 8.1 (L) 07/18/2018 01:54 AM  
 
Lab Results Component Value Date/Time Bilirubin, total 1.2 (H) 07/18/2018 01:54 AM  
 ALT (SGPT) 59 07/18/2018 01:54 AM  
 AST (SGOT) 97 (H) 07/18/2018 01:54 AM  
 Alk.  phosphatase 156 (H) 07/18/2018 01:54 AM  
 Protein, total 6.3 (L) 07/18/2018 01:54 AM  
 Albumin 2.3 (L) 07/18/2018 01:54 AM  
 Globulin 4.0 07/18/2018 01:54 AM 7/15/2018 CT PELV W CONT IMPRESSION: No pelvic abscess identified. 
   
7/15/2018 XR CHEST IMPRESSION:  
  
New development of mild to moderate interstitial edema. Right perihilar airspace 
disease which may represent asymmetric pulmonary edema versus pneumonia. Assessment/Plan 1) Fever and darby rectal abscess S/p I & D,  
abx coverage:   Vancomycin and Levaquin 2) Myelofibrosis Follows with heme/onc at [facility] : Dr Jass Meier S/p BMT in 2013, relapsed and since on Jakafi at 15 mg BID Primary oncologist / Dr Akins Person recommending resuming Jakafi at half dose and weaning off over the week; resumed at 15 mg daily. Continue with daily CBC Recommend follow up with primary heme/onc at [facility] upon discharge: already has appt in mid-Aug.  
 
3) Thrombocytopenia Secondary to MF, Jakafi and acute infection Monitor 4) Anemia (s/p 2 units PRBCs) Secondary to MF Hgb stable Minimize transfusions and consider only if Hgb < 7 g/dl 5) Leukocytosis Secondary to MF and acute infection Blasts 5%- can be a result of acute infection. < 10% blasts can be seen with MF and does not indicate leukemic transformation Continue to monitor 6) Iron overload Hold Exjade 7) Hypokalemia Received repletion Continue to monitor 8. Dispo In the process of moving: upon discharge will be going to new home in [place] Discussed with CM in case home health is needed at discharge. Plan reviewed with Dr Delora Lanes Signed By: Brice Barreto NP

## 2018-07-18 NOTE — PROGRESS NOTES
CM is following for discharge needs. Pt was provided a list of home health agencies for skilled nursing. She reported that she is moving to [place] and will need to be out of her home in [facility] by 5pm on Sunday. Her new address is: [address] [place] [address] phone: [phone](c)/[phone](new home number).   ELSA AdamsonW

## 2018-07-18 NOTE — PROGRESS NOTES
[facility] [address] [facility] Dosing Services: Antimicrobial Stewardship Daily Doc    Consult for antibiotic dosing of Vancomycin by Dr. Tiffanie Valle  Pharmacist reviewed antibiotic appropriateness for 75year old , female  for indication of fever, sepsis, immunocompromised  Day of Therapy 5    Vancomycin therapy:  Current maintenance dose: 1000mg q 12 hrs  Dose calculated to approximate a therapeutic trough of 15-20 mcg/mL. Last trough level   7/18: 15 mcg/ml at 9.5 hrs post-dose, extrapolates to 12 mcg/ml. Plan for level / Adjustment in Therapy:  Zosyn started, SCr stable (although slight increase from 2 days ago). Afebrile, WBC improving, clinically improving from notes  C/w 1000mg every 12 hours. Low threshold to reduce frequency    Dose administration notes:   Doses given appropriately as scheduled      Other Antimicrobial   (not dosed by pharmacist) Zosyn 3.375 gm IV Q8h   Cultures 7/14: Wound- E coli (Pan-sen)- final  Anaerobic- NG- final  7/13: Urine- No growth   7/13:Blood x2- NGTD- pending   Serum Creatinine Lab Results   Component Value Date/Time    Creatinine 1.02 07/18/2018 01:54 AM         Creatinine Clearance Estimated Creatinine Clearance: 45.3 mL/min (based on Cr of 1.02). Temp Temp: 98.7 °F (37.1 °C)       WBC Lab Results   Component Value Date/Time    WBC 15.3 (H) 07/18/2018 01:54 AM        H/H Lab Results   Component Value Date/Time    HGB 9.4 (L) 07/18/2018 01:54 AM        Platelets    Lab Results   Component Value Date/Time    PLATELET 41 (LL) 25/61/5023 01:54 AM          Thank you,  Bren Moseley, Pharm. D.

## 2018-07-18 NOTE — PROGRESS NOTES
Bedside and Verbal shift change report given to Rocio (oncoming nurse) by Danette Stafford (offgoing nurse). Report included the following information SBAR, Kardex, ED Summary, OR Summary, Procedure Summary, Intake/Output, MAR and Med Rec Status. 1011: MD called as patient is requesting more senokot and a suppository as she is uncomfortably constipated. He states she can have that. Awaiting order to be placed. 1346: Continue corcoran cath per MD for perianal wound and frequency stress incontinence. 1931: Bedside and Verbal shift change report given to Alyssa Lamb (oncoming nurse) by Rocio (offgoing nurse). Report included the following information SBAR, Kardex, ED Summary, OR Summary, Procedure Summary, Intake/Output and Recent Results.

## 2018-07-18 NOTE — ROUTINE PROCESS
Bedside and Verbal shift change report given to Danielle Melton RN (oncoming nurse) by Dileep Centeno RN (offgoing nurse). Report included the following information SBAR, Kardex, Intake/Output, MAR, Accordion and Recent Results. 2104- Notified MD Liliana Chaney regarding pt ability to meet telemetry criteria, pt remains afebrile and denies pain related to surgical site. Received orders to transfer to telemetry at this time.

## 2018-07-18 NOTE — PROGRESS NOTES
Daily Progress Note: 7/18/2018  Imani Enamorado MD    Assessment/Plan:   Sepsis POA due to below:Fever (7/13/2018)/  Leukocytosis (7/14/2018)/  [facility] (7/14/2018): in the setting of immunosuppression.   --broad spectrum abx - pip-tazo      Rhona-rectal abscess (7/14/2018): left side. Likely source of fever. IV antibiotics as above. S/p I+D on 7/14; wound cultures pending  --per surgery  -- pelvic CT did not show pelvic abscess     Myelofibrosis ([facility] (7/14/2018)/ Anemia/Thrombocytopenia ([facility] (7/14/2018): follows up at [facility]. Hgb up to 8s after 2 units of PRBCs 7/14  --heme following     Hypokalemia (7/14/2018): likely from vomiting. Replete and follow K+     Nausea and vomiting (7/14/2018): likely from infection as above. Hydrate. IV anti-emetics. Monitor     Code Status:  Full     Problem List:  Problem List as of 7/18/2018  Date Reviewed: 7/14/2018          Codes Class Noted - Resolved    Myelofibrosis ([facility] ICD-10-CM: D75.81  ICD-9-CM: 289.83  7/14/2018 - Present        Thrombocytopenia ([facility] ICD-10-CM: D69.6  ICD-9-CM: 287.5  7/14/2018 - Present        Hypokalemia ICD-10-CM: E87.6  ICD-9-CM: 276.8  7/14/2018 - Present        Leukocytosis ICD-10-CM: E95.695  ICD-9-CM: 288.60  7/14/2018 - Present        Nausea and vomiting ICD-10-CM: R11.2  ICD-9-CM: 787.01  7/14/2018 - Present        SIRS (systemic inflammatory response syndrome) ([facility] ICD-10-CM: R65.10  ICD-9-CM: 995.90  7/14/2018 - Present        Rhona-rectal abscess ICD-10-CM: K61.1  ICD-9-CM: 126  7/14/2018 - Present        * (Principal)Fever ICD-10-CM: R50.9  ICD-9-CM: 780.60  7/13/2018 - Present              Subjective:   Ms. Aurelio Moore is a 75 y.o. female who is being admitted for Fever. Ms. Aurelio Moore presented to our Emergency Department today complaining of intermittent fever and chills associated with generalized weakness.  She has also been having nausea and vomiting but denies any headaches, flu like or respiratory symptoms, No abdominal discomfort or diarrhea. She has not has any urinary symptoms. No overt focal symptoms. She does have a Hx of myelofibrosis and follows up at [facility]. Work up thus far has been unremarkable. In light of her immunosuppression, she will be admitted for further management. [Dr Florez Patch     : pt found to have perirectal abscess. Afebrile since 5AM.  Still nauseated. NPO for I+D in OR today with Dr Raheem Jaquez. 7/15: continues to be nauseated. Labs pending this AM.  Surgeon doesn't feel it is much better despite I+D yesterday. Pt says pain is better controlled with percocet. 18: K+ 2.6. Repleting with IV. (6 total) WBC a little better. Tmax 98.8.     :  Pt reports she is feeling somewhat better. Dr Jose A Bloom from ([address] [place] [address]) called last night (see note from last night) and wanted Mario 2 inhibitor restarted at 1/2 dose and weaned off from there. WBC remains 15K. K+ back down - replacing. Tmax 101.9. Tnow 98.6. Diuresed about a liter overnight. Checking Mag also. :  Reports \"starting to feel better. \"  Little pain from surg site. Tmax 99. K+ 3.1 - cont to replace. WBC remains at 15K. LFTs sl up - cont to monitor. Review of Systems:   A comprehensive review of systems was negative except for that written in the HPI. Objective:   Physical Exam:     Visit Vitals    /64 (BP 1 Location: Left arm, BP Patient Position: At rest)    Pulse 73    Temp 98.7 °F (37.1 °C)    Resp 18    Ht 5' 6\" (1.676 m)    Wt 70.8 kg (156 lb)    SpO2 96%    BMI 25.18 kg/m2    O2 Flow Rate (L/min): 2 l/min O2 Device: Nasal cannula    Temp (24hrs), Av.6 °F (37 °C), Min:98 °F (36.7 °C), Max:99.1 °F (37.3 °C)         1901 -  0700  In: 2510 [P.O.:1610;  I.V.:900]  Out: 6425 [Urine:6425]    General:  Alert, cooperative, no distress, appears stated age, nauseated appearing   Head:  Normocephalic, without obvious abnormality, atraumatic. Eyes:  Conjunctivae/corneas clear. PERRL, EOMs intact. Nose: Nares normal. Septum midline. Throat: Lips, mucosa, and tongue dry   Neck: Supple, symmetrical, trachea midline, no adenopathy, thyroid: no enlargement/tenderness/nodules, no carotid bruit and no JVD. Back:   Symmetric, no curvature. ROM normal. No CVA tenderness. Lungs:   A few basilar crackles bilaterally. Chest wall:  No tenderness or deformity. Heart:  Regular rate and rhythm, S1, S2 normal, 1/9 ASHA, click, rub or gallop. Abdomen:   Soft, non-tender. Bowel sounds normal. No masses,  No organomegaly. : corcoran in place, L>R labia indurated and red, L glut and perirectal area dressed   Extremities: Extremities normal, atraumatic, no cyanosis or edema. No calf tenderness or cords. Pulses: 2+ and symmetric all extremities. Skin: Skin color, texture, turgor normal. No rashes or lesions   Neurologic: CNII-XII intact. Alert and oriented X 3. Fine motor of hands and fingers normal.   equal.  Gait not tested at this time. Sensation grossly normal to touch. Gross motor of extremities normal.       Data Review:       Recent Days:  Recent Labs      07/18/18   0154  07/17/18   0134  07/16/18   0123   WBC  15.3*  15.4*  15.0*   HGB  9.4*  9.2*  9.2*   HCT  29.1*  28.2*  28.4*   PLT  41*  40*  46*     Recent Labs      07/18/18   0154  07/17/18   1350  07/17/18   0134  07/16/18   0853   NA  140   --   138  135*   K  3.1*   --   2.5*  3.2*   CL  102   --   101  101   CO2  32   --   26  24   GLU  106*   --   147*  115*   BUN  20   --   14  10   CREA  1.02   --   1.01  0.85   CA  8.1*   --   7.9*  7.6*   MG  1.9   --   2.0   --    ALB  2.3*  2.4*   --    --    SGOT  97*  85*   --    --    ALT  59  51   --    --      No results for input(s): PH, PCO2, PO2, HCO3, FIO2 in the last 72 hours.     24 Hour Results:  Recent Results (from the past 24 hour(s))   HEPATIC FUNCTION PANEL    Collection Time: 07/17/18  1:50 PM Result Value Ref Range    Protein, total 6.4 6.4 - 8.2 g/dL    Albumin 2.4 (L) 3.5 - 5.0 g/dL    Globulin 4.0 2.0 - 4.0 g/dL    A-G Ratio 0.6 (L) 1.1 - 2.2      Bilirubin, total 1.6 (H) 0.2 - 1.0 MG/DL    Bilirubin, direct 0.9 (H) 0.0 - 0.2 MG/DL    Alk. phosphatase 161 (H) 45 - 117 U/L    AST (SGOT) 85 (H) 15 - 37 U/L    ALT (SGPT) 51 12 - 78 U/L   CBC WITH AUTOMATED DIFF    Collection Time: 07/18/18  1:54 AM   Result Value Ref Range    WBC 15.3 (H) 3.6 - 11.0 K/uL    RBC 3.13 (L) 3.80 - 5.20 M/uL    HGB 9.4 (L) 11.5 - 16.0 g/dL    HCT 29.1 (L) 35.0 - 47.0 %    MCV 93.0 80.0 - 99.0 FL    MCH 30.0 26.0 - 34.0 PG    MCHC 32.3 30.0 - 36.5 g/dL    RDW 20.4 (H) 11.5 - 14.5 %    PLATELET 41 (LL) 371 - 400 K/uL    MPV ABNORMAL 8.9 - 12.9 FL    NRBC 8.4 (H) 0  WBC    ABSOLUTE NRBC 1.29 (H) 0.00 - 0.01 K/uL    NEUTROPHILS 49 32 - 75 %    BAND NEUTROPHILS 3 0 - 6 %    LYMPHOCYTES 5 (L) 12 - 49 %    MONOCYTES 37 (H) 5 - 13 %    EOSINOPHILS 0 0 - 7 %    BASOPHILS 1 0 - 1 %    METAMYELOCYTES 3 (H) 0 %    PROMYELOCYTES 2 (H) 0 %    IMMATURE GRANULOCYTES 0 %    ABS. NEUTROPHILS 8.0 1.8 - 8.0 K/UL    ABS. LYMPHOCYTES 0.8 0.8 - 3.5 K/UL    ABS. MONOCYTES 5.7 (H) 0.0 - 1.0 K/UL    ABS. EOSINOPHILS 0.0 0.0 - 0.4 K/UL    ABS. BASOPHILS 0.2 (H) 0.0 - 0.1 K/UL    ABS. IMM.  GRANS. 0.0 K/UL    DF MANUAL      RBC COMMENTS ANISOCYTOSIS  2+        RBC COMMENTS TEARDROP CELLS  1+       MAGNESIUM    Collection Time: 07/18/18  1:54 AM   Result Value Ref Range    Magnesium 1.9 1.6 - 2.4 mg/dL   METABOLIC PANEL, COMPREHENSIVE    Collection Time: 07/18/18  1:54 AM   Result Value Ref Range    Sodium 140 136 - 145 mmol/L    Potassium 3.1 (L) 3.5 - 5.1 mmol/L    Chloride 102 97 - 108 mmol/L    CO2 32 21 - 32 mmol/L    Anion gap 6 5 - 15 mmol/L    Glucose 106 (H) 65 - 100 mg/dL    BUN 20 6 - 20 MG/DL    Creatinine 1.02 0.55 - 1.02 MG/DL    BUN/Creatinine ratio 20 12 - 20      GFR est AA >60 >60 ml/min/1.73m2    GFR est non-AA 53 (L) >60 ml/min/1.73m2    Calcium 8.1 (L) 8.5 - 10.1 MG/DL    Bilirubin, total 1.2 (H) 0.2 - 1.0 MG/DL    ALT (SGPT) 59 12 - 78 U/L    AST (SGOT) 97 (H) 15 - 37 U/L    Alk. phosphatase 156 (H) 45 - 117 U/L    Protein, total 6.3 (L) 6.4 - 8.2 g/dL    Albumin 2.3 (L) 3.5 - 5.0 g/dL    Globulin 4.0 2.0 - 4.0 g/dL    A-G Ratio 0.6 (L) 1.1 - 2.2         Medications reviewed  Current Facility-Administered Medications   Medication Dose Route Frequency    potassium chloride (KLOR-CON) packet 20 mEq  20 mEq Oral TID WITH MEALS    polyethylene glycol (MIRALAX) packet 17 g  17 g Oral DAILY    vancomycin (VANCOCIN) 1,000 mg in 0.9% sodium chloride (MBP/ADV) 250 mL  1,000 mg IntraVENous Q12H    piperacillin-tazobactam (ZOSYN) 3.375 g in 0.9% sodium chloride (MBP/ADV) 100 mL  3.375 g IntraVENous Q8H    ruxolitinib tab 15 mg (Patient Supplied)  15 mg Oral DAILY    aluminum-magnesium hydroxide (MAALOX) oral suspension 30 mL  30 mL Oral QID PRN    promethazine (PHENERGAN) 25 mg in NS IVPB  25 mg IntraVENous Q8H PRN    senna-docusate (PERICOLACE) 8.6-50 mg per tablet 1 Tab  1 Tab Oral DAILY    pantoprazole (PROTONIX) tablet 40 mg  40 mg Oral ACB    oxyCODONE-acetaminophen (PERCOCET) 5-325 mg per tablet 1 Tab  1 Tab Oral Q4H PRN    HYDROmorphone (DILAUDID) tablet 1 mg  1 mg Oral Q4H PRN    senna (SENOKOT) tablet 17.2 mg  2 Tab Oral QHS    sodium chloride (NS) flush 5-10 mL  5-10 mL IntraVENous PRN    sodium chloride (NS) flush 5-10 mL  5-10 mL IntraVENous Q8H    sodium chloride (NS) flush 5-10 mL  5-10 mL IntraVENous PRN    acetaminophen (TYLENOL) tablet 650 mg  650 mg Oral Q4H PRN    ondansetron (ZOFRAN) injection 4 mg  4 mg IntraVENous Q4H PRN    0.9% sodium chloride infusion 250 mL  250 mL IntraVENous PRN     Care Plan discussed with: Patient and Nurse  Total time spent with patient: 30 minutes.   Alo Manzo MD

## 2018-07-18 NOTE — PROGRESS NOTES
[facility] Progress Note           Charmaine Brewer DO    [phone] Office  [phone]  Fax    2018      Assessment & Plan:   1. Perirectal abscess. S/p I&D . Cultures growing ecoli. Abx changed to pip-tazo . Patient continues to improve. Will deescalate abx to augmentin/doxy when she is ready for discharge    2. Myelofibrosis. Anemia/Thrombocytopenia Follows up at [facility].    3. Immunosuppressed host.  Arlester Cancel being held by oncology due to infection  4. CHF. Cardiology following          Subjective:     C/o weakness    Objective:     Vitals:   Visit Vitals    /65 (BP 1 Location: Left arm, BP Patient Position: At rest)    Pulse 89    Temp 98.5 °F (36.9 °C)    Resp 18    Ht 5' 6\" (1.676 m)    Wt 70.8 kg (156 lb)    SpO2 96%    BMI 25.18 kg/m2        Tmax:  Temp (24hrs), Av.6 °F (37 °C), Min:98 °F (36.7 °C), Max:99.1 °F (37.3 °C)      Exam:   Patient is intubated:  no    Physical Examination:   General:  Alert, cooperative, no distress   Head:  Normocephalic, atraumatic. Eyes:  Conjunctivae clear   Neck:        Lungs:   No distress. Chest wall:     Heart:  Regular rate and rhythm   Abdomen:      Extremities: Moves all. Skin:  Perirectal abscess packed. Erythema improving   Neurologic: CNII-XII intact. Labs:        No lab exists for component: ITNL   No results for input(s): CPK, CKMB, TROIQ in the last 72 hours.   Recent Labs      18   0154  18   1350  18   0134  18   0853  18   0123   NA  140   --   138  135*  136   K  3.1*   --   2.5*  3.2*  2.6*   CL  102   --   101  101  101   CO2  32   --   26  24  27   BUN  20   --   14  10  10   CREA  1.02   --   1.01  0.85  1.08*   GLU  106*   --   147*  115*  126*   MG  1.9   --   2.0   --    --    ALB  2.3*  2.4*   --    --    --    WBC  15.3*   --   15.4*   --   15.0*   HGB  9.4*   --   9.2*   --   9.2*   HCT  29.1*   --   28.2*   --   28.4*   PLT  41*   --   40* --   46*     No results for input(s): INR, PTP, APTT in the last 72 hours.     No lab exists for component: INREXT, INREXT  Needs: urine analysis, urine sodium, protein and creatinine  No results found for: ANKUSH, CREAU      Cultures:     No results found for: SDES  Lab Results   Component Value Date/Time    Culture result: LIGHT ESCHERICHIA COLI (A) 07/14/2018 02:04 PM    Culture result: NO ANAEROBES ISOLATED 07/14/2018 02:04 PM    Culture result: NO GROWTH 5 DAYS 07/13/2018 09:36 PM       Radiology:     Medications       Current Facility-Administered Medications   Medication Dose Route Frequency Last Dose    potassium chloride 10 mEq in 100 ml IVPB  10 mEq IntraVENous Q1H 10 mEq at 07/18/18 1019    potassium chloride (KLOR-CON) packet 20 mEq  20 mEq Oral TID WITH MEALS 20 mEq at 07/18/18 0727    polyethylene glycol (MIRALAX) packet 17 g  17 g Oral DAILY 17 g at 07/18/18 0824    vancomycin (VANCOCIN) 1,000 mg in 0.9% sodium chloride (MBP/ADV) 250 mL  1,000 mg IntraVENous Q12H 1,000 mg at 07/18/18 0448    piperacillin-tazobactam (ZOSYN) 3.375 g in 0.9% sodium chloride (MBP/ADV) 100 mL  3.375 g IntraVENous Q8H 3.375 g at 07/18/18 0727    ruxolitinib tab 15 mg (Patient Supplied)  15 mg Oral DAILY 15 mg at 07/18/18 0824    aluminum-magnesium hydroxide (MAALOX) oral suspension 30 mL  30 mL Oral QID PRN 30 mL at 07/16/18 2147    promethazine (PHENERGAN) 25 mg in NS IVPB  25 mg IntraVENous Q8H PRN 25 mg at 07/16/18 0919    senna-docusate (PERICOLACE) 8.6-50 mg per tablet 1 Tab  1 Tab Oral DAILY 1 Tab at 07/18/18 0824    pantoprazole (PROTONIX) tablet 40 mg  40 mg Oral ACB 40 mg at 07/18/18 0727    oxyCODONE-acetaminophen (PERCOCET) 5-325 mg per tablet 1 Tab  1 Tab Oral Q4H PRN 1 Tab at 07/16/18 0120    HYDROmorphone (DILAUDID) tablet 1 mg  1 mg Oral Q4H PRN      senna (SENOKOT) tablet 17.2 mg  2 Tab Oral QHS 17.2 mg at 07/17/18 9408    sodium chloride (NS) flush 5-10 mL  5-10 mL IntraVENous PRN      sodium chloride (NS) flush 5-10 mL  5-10 mL IntraVENous Q8H 10 mL at 07/18/18 0728    sodium chloride (NS) flush 5-10 mL  5-10 mL IntraVENous PRN      acetaminophen (TYLENOL) tablet 650 mg  650 mg Oral Q4H  mg at 07/17/18 2045    ondansetron (ZOFRAN) injection 4 mg  4 mg IntraVENous Q4H PRN 4 mg at 07/16/18 0645    0.9% sodium chloride infusion 250 mL  250 mL IntraVENous PRN             Case discussed with:      Clark Carmichael DO

## 2018-07-19 LAB
ALBUMIN SERPL-MCNC: 2.3 G/DL (ref 3.5–5)
ALBUMIN/GLOB SERPL: 0.6 {RATIO} (ref 1.1–2.2)
ALP SERPL-CCNC: 159 U/L (ref 45–117)
ALT SERPL-CCNC: 64 U/L (ref 12–78)
ANION GAP SERPL CALC-SCNC: 4 MMOL/L (ref 5–15)
AST SERPL-CCNC: 84 U/L (ref 15–37)
BACTERIA SPEC CULT: NORMAL
BACTERIA SPEC CULT: NORMAL
BASOPHILS # BLD: 0 K/UL (ref 0–0.1)
BASOPHILS NFR BLD: 0 % (ref 0–1)
BILIRUB SERPL-MCNC: 1 MG/DL (ref 0.2–1)
BNP SERPL-MCNC: 656 PG/ML (ref 0–125)
BNP SERPL-MCNC: 760 PG/ML (ref 0–125)
BUN SERPL-MCNC: 15 MG/DL (ref 6–20)
BUN/CREAT SERPL: 17 (ref 12–20)
CALCIUM SERPL-MCNC: 8 MG/DL (ref 8.5–10.1)
CHLORIDE SERPL-SCNC: 105 MMOL/L (ref 97–108)
CO2 SERPL-SCNC: 28 MMOL/L (ref 21–32)
CREAT SERPL-MCNC: 0.88 MG/DL (ref 0.55–1.02)
DIFFERENTIAL METHOD BLD: ABNORMAL
EOSINOPHIL # BLD: 0 K/UL (ref 0–0.4)
EOSINOPHIL NFR BLD: 0 % (ref 0–7)
ERYTHROCYTE [DISTWIDTH] IN BLOOD BY AUTOMATED COUNT: 20.1 % (ref 11.5–14.5)
GLOBULIN SER CALC-MCNC: 3.6 G/DL (ref 2–4)
GLUCOSE SERPL-MCNC: 117 MG/DL (ref 65–100)
HCT VFR BLD AUTO: 29.8 % (ref 35–47)
HGB BLD-MCNC: 9.3 G/DL (ref 11.5–16)
IMM GRANULOCYTES # BLD: 0 K/UL
IMM GRANULOCYTES NFR BLD AUTO: 0 %
LYMPHOCYTES # BLD: 1.3 K/UL (ref 0.8–3.5)
LYMPHOCYTES NFR BLD: 8 % (ref 12–49)
MAGNESIUM SERPL-MCNC: 1.9 MG/DL (ref 1.6–2.4)
MCH RBC QN AUTO: 29.3 PG (ref 26–34)
MCHC RBC AUTO-ENTMCNC: 31.2 G/DL (ref 30–36.5)
MCV RBC AUTO: 94 FL (ref 80–99)
METAMYELOCYTES NFR BLD MANUAL: 1 %
MONOCYTES # BLD: 4.2 K/UL (ref 0–1)
MONOCYTES NFR BLD: 26 % (ref 5–13)
MYELOCYTES NFR BLD MANUAL: 3 %
NEUTS BAND NFR BLD MANUAL: 4 % (ref 0–6)
NEUTS SEG # BLD: 10.1 K/UL (ref 1.8–8)
NEUTS SEG NFR BLD: 58 % (ref 32–75)
NRBC # BLD: 0.5 K/UL (ref 0–0.01)
NRBC BLD-RTO: 3.1 PER 100 WBC
PLATELET # BLD AUTO: 40 K/UL (ref 150–400)
PMV BLD AUTO: ABNORMAL FL (ref 8.9–12.9)
POTASSIUM SERPL-SCNC: 4.2 MMOL/L (ref 3.5–5.1)
PROT SERPL-MCNC: 5.9 G/DL (ref 6.4–8.2)
RBC # BLD AUTO: 3.17 M/UL (ref 3.8–5.2)
RBC MORPH BLD: ABNORMAL
RBC MORPH BLD: ABNORMAL
SERVICE CMNT-IMP: NORMAL
SERVICE CMNT-IMP: NORMAL
SODIUM SERPL-SCNC: 137 MMOL/L (ref 136–145)
WBC # BLD AUTO: 16.3 K/UL (ref 3.6–11)

## 2018-07-19 PROCEDURE — 74011250637 HC RX REV CODE- 250/637: Performed by: FAMILY MEDICINE

## 2018-07-19 PROCEDURE — 85025 COMPLETE CBC W/AUTO DIFF WBC: CPT | Performed by: FAMILY MEDICINE

## 2018-07-19 PROCEDURE — 36415 COLL VENOUS BLD VENIPUNCTURE: CPT | Performed by: FAMILY MEDICINE

## 2018-07-19 PROCEDURE — 94760 N-INVAS EAR/PLS OXIMETRY 1: CPT

## 2018-07-19 PROCEDURE — 83735 ASSAY OF MAGNESIUM: CPT | Performed by: FAMILY MEDICINE

## 2018-07-19 PROCEDURE — 97161 PT EVAL LOW COMPLEX 20 MIN: CPT | Performed by: PHYSICAL THERAPIST

## 2018-07-19 PROCEDURE — 97116 GAIT TRAINING THERAPY: CPT | Performed by: PHYSICAL THERAPIST

## 2018-07-19 PROCEDURE — 97530 THERAPEUTIC ACTIVITIES: CPT | Performed by: PHYSICAL THERAPIST

## 2018-07-19 PROCEDURE — 74011250637 HC RX REV CODE- 250/637: Performed by: INTERNAL MEDICINE

## 2018-07-19 PROCEDURE — 74011250636 HC RX REV CODE- 250/636: Performed by: INTERNAL MEDICINE

## 2018-07-19 PROCEDURE — 65660000000 HC RM CCU STEPDOWN

## 2018-07-19 PROCEDURE — 74011000258 HC RX REV CODE- 258: Performed by: INTERNAL MEDICINE

## 2018-07-19 PROCEDURE — 80053 COMPREHEN METABOLIC PANEL: CPT | Performed by: FAMILY MEDICINE

## 2018-07-19 PROCEDURE — 83880 ASSAY OF NATRIURETIC PEPTIDE: CPT | Performed by: FAMILY MEDICINE

## 2018-07-19 RX ORDER — AMOXICILLIN 250 MG
1 CAPSULE ORAL 2 TIMES DAILY
Status: DISCONTINUED | OUTPATIENT
Start: 2018-07-19 | End: 2018-07-31 | Stop reason: HOSPADM

## 2018-07-19 RX ADMIN — PANTOPRAZOLE SODIUM 40 MG: 40 TABLET, DELAYED RELEASE ORAL at 07:19

## 2018-07-19 RX ADMIN — STANDARDIZED SENNA CONCENTRATE AND DOCUSATE SODIUM 1 TABLET: 8.6; 5 TABLET, FILM COATED ORAL at 08:51

## 2018-07-19 RX ADMIN — POTASSIUM CHLORIDE 20 MEQ: 1.5 POWDER, FOR SOLUTION ORAL at 08:51

## 2018-07-19 RX ADMIN — SENNOSIDES 43 MG: 8.6 TABLET, FILM COATED ORAL at 22:56

## 2018-07-19 RX ADMIN — PIPERACILLIN SODIUM,TAZOBACTAM SODIUM 3.38 G: 3; .375 INJECTION, POWDER, FOR SOLUTION INTRAVENOUS at 07:19

## 2018-07-19 RX ADMIN — Medication 10 ML: at 22:57

## 2018-07-19 RX ADMIN — VANCOMYCIN HYDROCHLORIDE 1000 MG: 1 INJECTION, POWDER, LYOPHILIZED, FOR SOLUTION INTRAVENOUS at 16:49

## 2018-07-19 RX ADMIN — POTASSIUM CHLORIDE 20 MEQ: 1.5 POWDER, FOR SOLUTION ORAL at 16:49

## 2018-07-19 RX ADMIN — PIPERACILLIN SODIUM,TAZOBACTAM SODIUM 3.38 G: 3; .375 INJECTION, POWDER, FOR SOLUTION INTRAVENOUS at 14:34

## 2018-07-19 RX ADMIN — Medication 10 ML: at 07:19

## 2018-07-19 RX ADMIN — VANCOMYCIN HYDROCHLORIDE 1000 MG: 1 INJECTION, POWDER, LYOPHILIZED, FOR SOLUTION INTRAVENOUS at 03:48

## 2018-07-19 RX ADMIN — ACETAMINOPHEN 650 MG: 325 TABLET ORAL at 23:01

## 2018-07-19 RX ADMIN — POTASSIUM CHLORIDE 20 MEQ: 1.5 POWDER, FOR SOLUTION ORAL at 11:22

## 2018-07-19 RX ADMIN — PIPERACILLIN SODIUM,TAZOBACTAM SODIUM 3.38 G: 3; .375 INJECTION, POWDER, FOR SOLUTION INTRAVENOUS at 22:56

## 2018-07-19 RX ADMIN — SENNOSIDES AND DOCUSATE SODIUM 1 TABLET: 8.6; 5 TABLET ORAL at 22:56

## 2018-07-19 NOTE — PROGRESS NOTES
NUTRITION    RECOMMENDATIONS:     1. Continue to encourage  Po intake    ASSESSMENT:   7/19: Patient seen due to LOS. Admitted with fever, s/p incision and Drainage, excisional debridement of   perirectal abscess. Visited pt who states her appetite is good, finally had a bowel movement so she is feeling better. She is in the process of moving as well as she care for her  who is wheelchair bound. States she eats well at breakfast and lunch, then gets tired by dinner time. We discussed easy meal preparation as well as keeping ONS at home if needed. Pt verbalized understanding, writer provide her with a coupon and will send Ensure High Protein at PM snack for her to try. She denies any weight changes, reports  lbs. Past Medical History:   Diagnosis Date    Myelofibrosis ([facility] [address]      BMT in 2013 for MF but relapsed soon after. Has since been on Jakafi and follows with Hematology at [facility]       Diet: Regular    Abd:   wdl         BM: 7/19    Skin Integrity: []Intact  [x]Other: Perineum wound from surgery  Edema: [x]None []Other    Nutritionally Significant Medications: [x] Reviewed & Includes: Maalox, Dilaudid, Zofran, Protonix, Potassium Chloride, Phenergan, Senokot, Vancomycin    Labs:    Lab Results   Component Value Date/Time    Sodium 137 07/19/2018 01:25 AM    Potassium 4.2 07/19/2018 01:25 AM    Chloride 105 07/19/2018 01:25 AM    CO2 28 07/19/2018 01:25 AM    Anion gap 4 (L) 07/19/2018 01:25 AM    Glucose 117 (H) 07/19/2018 01:25 AM    BUN 15 07/19/2018 01:25 AM    Creatinine 0.88 07/19/2018 01:25 AM    Calcium 8.0 (L) 07/19/2018 01:25 AM    Magnesium 1.9 07/19/2018 01:25 AM    Albumin 2.3 (L) 07/19/2018 01:25 AM       Anthropometrics:   Weight Source: Bed (7/19 0351)  Height: 5' 6\" (167.6 cm),    Body mass index is 25.94 kg/(m^2).   IBW : 61.2 kg (135 lb), % IBW (Calculated): 119.04 %, Usual Body Weight: 68 kg (150 lb),    Wt Readings from Last 5 Encounters:   07/19/18 72.9 kg (160 lb 11.2 oz)       Estimated Daily Nutrition Requirements:   Weight Used: UBW (150 lbs)  Kcals: 1560 Kcals/day Based on:Lucila Bedolla (x 1.3)  Protein: 68 g (1 gm/kg)   Fluid: 1700 ml (25 ml/kg)      Education & Discharge Needs:   [] Pt discussed in ID rounds     Nutrition related discharge needs addressed:     [x] Supplements (on d/c instruction &/or coupons provided)    [] Tube Feedings     [] Education    []No nutrition related discharge needs at this time     Cultural, Jehovah's witness and ethnic food preferences identified    [x] None   [] Yes     NUTRITION DIAGNOSIS:     Increased nutrient needs related to wound healing  as evidenced by perineum wound from I&D , excisional debridement of perirectal abcess                     Pt is at Nutrition Risk:  [x]    No Nutrition Risk Identified:  []    RD INTERVENTION / PT GOALS:     Food/Nutrient Delivery:   , Supplements: Commercial supplement (Ensure HIgh Protein PM snack),  ,  ,    Nutrition Education: ,    Nutrition Counseling:    Coordination of Care:      Goal: Pt will continue to eat well at meals and 100% of Ensure at snack prior to discharge    MONITORING & EVALUATION:   Food/Nutrient Intake Outcomes:  Total energy intake, Liquid meal replacement          Previous Nutrition Goals:  Previous Goal Met: N/A  Previous Recommendations:      Previous Recommendations Implemented: N/A       Carissa Keita RD

## 2018-07-19 NOTE — PROGRESS NOTES
7- CASE MANAGEMENT NOTE:  I met with the pt to continue discharge planning. She said her daughter, Eladio Nielson ([phone]), is moving the pt's  and their belongings today to [address] The new house will be fairly near their son and family who live in [place]. The new home is 2 level and the pt and  will live on the lower level (ground entrance from the rear) and the upper level can be occupied by caregivers when needed. She said she would like to use [phone] [id number]) upon discharge for wound care and PT/OT. I conveyed this to Dr. Tori Garcia and she will write home health orders tomorrow.  Ge Coburn, BSW, CM

## 2018-07-19 NOTE — PROGRESS NOTES
[facility] Progress Note           Lissy Looney DO    [phone] Office  [phone]  Fax    2018      Assessment & Plan:   1. Perirectal abscess. S/p I&D . Cultures growing ecoli. Abx changed to pip-tazo . Patient continues to improve. Will deescalate abx to augmentin/doxy when she is ready for discharge    2. Myelofibrosis. Anemia/Thrombocytopenia Follows up at [facility].    3. Leukocytosis. Related to MF  4. Immunosuppressed host.  Nany Enrique restarted at 1/2 dose. 5. CHF. Cardiology following          Subjective:     C/o weakness    Objective:     Vitals:   Visit Vitals    /72 (BP 1 Location: Left arm, BP Patient Position: At rest)    Pulse 80    Temp 98.3 °F (36.8 °C)    Resp 16    Ht 5' 6\" (1.676 m)    Wt 72.9 kg (160 lb 11.2 oz)    SpO2 96%    BMI 25.94 kg/m2        Tmax:  Temp (24hrs), Av.8 °F (37.1 °C), Min:98.3 °F (36.8 °C), Max:99.7 °F (37.6 °C)      Exam:   Patient is intubated:  no    Physical Examination:   General:  Alert, cooperative, no distress   Head:  Normocephalic, atraumatic. Eyes:  Conjunctivae clear   Neck:        Lungs:   No distress. Chest wall:     Heart:  Regular rate and rhythm   Abdomen:      Extremities: Moves all. Skin:  Perirectal abscess packed. Erythema improving   Neurologic: CNII-XII intact. Labs:        No lab exists for component: ITNL   No results for input(s): CPK, CKMB, TROIQ in the last 72 hours.   Recent Labs      18   0125  18   0154  18   1350  18   0134   NA  137  140   --   138   K  4.2  3.1*   --   2.5*   CL  105  102   --   101   CO2  28  32   --   26   BUN  15  20   --   14   CREA  0.88  1.02   --   1.01   GLU  117*  106*   --   147*   MG  1.9  1.9   --   2.0   ALB  2.3*  2.3*  2.4*   --    WBC  16.3*  15.3*   --   15.4*   HGB  9.3*  9.4*   --   9.2*   HCT  29.8*  29.1*   --   28.2*   PLT  40*  41*   --   40*     No results for input(s): INR, PTP, APTT in the last 72 hours.     No lab exists for component: INREXT, INREXT  Needs: urine analysis, urine sodium, protein and creatinine  No results found for: ANKUSH, CREAU      Cultures:     No results found for: SDES  Lab Results   Component Value Date/Time    Culture result: LIGHT ESCHERICHIA COLI (A) 07/14/2018 02:04 PM    Culture result: NO ANAEROBES ISOLATED 07/14/2018 02:04 PM    Culture result: NO GROWTH 6 DAYS 07/13/2018 09:36 PM       Radiology:     Medications       Current Facility-Administered Medications   Medication Dose Route Frequency Last Dose    senna-docusate (PERICOLACE) 8.6-50 mg per tablet 1 Tab  1 Tab Oral BID      senna (SENOKOT) tablet 43 mg  5 Tab Oral QHS 43 mg at 07/18/18 2230    potassium chloride (KLOR-CON) packet 20 mEq  20 mEq Oral TID WITH MEALS 20 mEq at 07/19/18 1122    vancomycin (VANCOCIN) 1,000 mg in 0.9% sodium chloride (MBP/ADV) 250 mL  1,000 mg IntraVENous Q12H 1,000 mg at 07/19/18 0348    piperacillin-tazobactam (ZOSYN) 3.375 g in 0.9% sodium chloride (MBP/ADV) 100 mL  3.375 g IntraVENous Q8H 3.375 g at 07/19/18 1434    ruxolitinib tab 15 mg (Patient Supplied)  15 mg Oral DAILY 15 mg at 07/19/18 0853    aluminum-magnesium hydroxide (MAALOX) oral suspension 30 mL  30 mL Oral QID PRN 30 mL at 07/16/18 2147    promethazine (PHENERGAN) 25 mg in NS IVPB  25 mg IntraVENous Q8H PRN 25 mg at 07/16/18 0919    pantoprazole (PROTONIX) tablet 40 mg  40 mg Oral ACB 40 mg at 07/19/18 0719    oxyCODONE-acetaminophen (PERCOCET) 5-325 mg per tablet 1 Tab  1 Tab Oral Q4H PRN 1 Tab at 07/16/18 0120    HYDROmorphone (DILAUDID) tablet 1 mg  1 mg Oral Q4H PRN      sodium chloride (NS) flush 5-10 mL  5-10 mL IntraVENous PRN      sodium chloride (NS) flush 5-10 mL  5-10 mL IntraVENous Q8H 10 mL at 07/19/18 0719    sodium chloride (NS) flush 5-10 mL  5-10 mL IntraVENous PRN      acetaminophen (TYLENOL) tablet 650 mg  650 mg Oral Q4H  mg at 07/18/18 6020    ondansetron (ZOFRAN) injection 4 mg 4 mg IntraVENous Q4H PRN 4 mg at 07/16/18 0645    0.9% sodium chloride infusion 250 mL  250 mL IntraVENous PRN             Case discussed with:      Unknown Candelario, DO

## 2018-07-19 NOTE — PROGRESS NOTES
[facility] at [address], [address] [place] [address] [address] W: [phone]  F: [phone] Reason for Visit:  
Beau Clark is a 75 y.o. female who is seen in consultation at the request of Dr. Robbie Gold for evaluation of Myelofibrosis. History of Present Illness:  
Patient is a 75 y. o. with PMF who is admitted on 7/14/18 with c/o weakness, N/V and intermittent fevers x 5 days. She has a known h/o UTIs. Noted to have anemia, thrombocytopenia and leucocytosis on admission. CXR and UA unremarkable. She was started on Levaquin and Vancomycin and underwent I & D for a perirectal abscess on 7/14/18. She states that she underwent a BMT in 2013 for MF but relapsed soon after. Has since been on Jakafi and follows with Hematology at [facility]. She has also been on Exjade for transfusion iron overload. Does not think she has an enlarged spleen. She relocated from [facility] last year but was in the process of moving back later in this week. In the last 3-4 days she noted weakness, confusion, intermittent fevers and urinary incontinence. 1 Day prior to presentation she thought she felt a swelling in her rectal area. Hence she presented to the ER when she was found to have a temp of 102 F Since the I and D she has had a Tmax of 100F. Has better alertness, still has pain in the perirectal area, has no chills or bleeding. Notes some nausea x 1 day. No CP, SOB, Cough, dysuria. Has not had a BM since 3 days but has no abdominal discomfort Interval History:  
Feeling better each day; appetite good; has had a BM Follows with Dr Victor Hugo Leonard/oncologist at [facility].([phone]); I have spoken with her Past Medical History:  
Diagnosis Date  Myelofibrosis ([address] BMT in 2013 for MF but relapsed soon after. Has since been on Jakafi and follows with Hematology at [facility] Past Surgical History:  
Procedure Laterality Date  HX BONE MARROW TRANSPLANT  HX VASCULAR ACCESS    
 right portacath Social History Substance Use Topics  Smoking status: Never Smoker  Smokeless tobacco: Never Used  Alcohol use Yes Comment: seldom Family History Problem Relation Age of Onset  Heart Attack Mother Current Facility-Administered Medications Medication Dose Route Frequency  senna-docusate (PERICOLACE) 8.6-50 mg per tablet 1 Tab  1 Tab Oral BID  senna (SENOKOT) tablet 43 mg  5 Tab Oral QHS  potassium chloride (KLOR-CON) packet 20 mEq  20 mEq Oral TID WITH MEALS  vancomycin (VANCOCIN) 1,000 mg in 0.9% sodium chloride (MBP/ADV) 250 mL  1,000 mg IntraVENous Q12H  piperacillin-tazobactam (ZOSYN) 3.375 g in 0.9% sodium chloride (MBP/ADV) 100 mL  3.375 g IntraVENous Q8H  
 ruxolitinib tab 15 mg (Patient Supplied)  15 mg Oral DAILY  aluminum-magnesium hydroxide (MAALOX) oral suspension 30 mL  30 mL Oral QID PRN  promethazine (PHENERGAN) 25 mg in NS IVPB  25 mg IntraVENous Q8H PRN  pantoprazole (PROTONIX) tablet 40 mg  40 mg Oral ACB  oxyCODONE-acetaminophen (PERCOCET) 5-325 mg per tablet 1 Tab  1 Tab Oral Q4H PRN  
 HYDROmorphone (DILAUDID) tablet 1 mg  1 mg Oral Q4H PRN  
 sodium chloride (NS) flush 5-10 mL  5-10 mL IntraVENous PRN  
 sodium chloride (NS) flush 5-10 mL  5-10 mL IntraVENous Q8H  
 sodium chloride (NS) flush 5-10 mL  5-10 mL IntraVENous PRN  
 acetaminophen (TYLENOL) tablet 650 mg  650 mg Oral Q4H PRN  
 ondansetron (ZOFRAN) injection 4 mg  4 mg IntraVENous Q4H PRN  
 0.9% sodium chloride infusion 250 mL  250 mL IntraVENous PRN No Known Allergies Review of Systems: A complete review of systems was obtained, negative except as described above. Physical Exam:  
 
Visit Vitals  /72 (BP 1 Location: Left arm, BP Patient Position: At rest)  Pulse 80  Temp 98.3 °F (36.8 °C)  Resp 16  
 Ht 5' 6\" (1.676 m)  Wt 160 lb 11.2 oz (72.9 kg)  SpO2 96%  BMI 25.94 kg/m2 ECOG PS: 2 
General: Nauseous but otherwise NAD Eyes: PERRLA, anicteric sclerae HENT: Atraumatic, OP clear Neck: Supple Respiratory: CTAB, normal respiratory effort CV:  regular rhythm, no murmurs, no peripheral edema GI: Soft, nontender, nondistended, no masses, no hepatomegaly, no splenomegaly MS:  Digits without clubbing or cyanosis. Skin: No rashes, ecchymoses, or petechiae. Normal temperature, turgor, and texture. Psych: Alert, oriented, appropriate affect, normal judgment/insight External genitalia Erythematous vulva. Packing noted Results:  
 
Lab Results Component Value Date/Time WBC 16.3 (H) 07/19/2018 01:25 AM  
 HGB 9.3 (L) 07/19/2018 01:25 AM  
 HCT 29.8 (L) 07/19/2018 01:25 AM  
 PLATELET 40 (LL) 16/81/1335 01:25 AM  
 MCV 94.0 07/19/2018 01:25 AM  
 ABS. NEUTROPHILS 10.1 (H) 07/19/2018 01:25 AM  
 
Lab Results Component Value Date/Time Sodium 137 07/19/2018 01:25 AM  
 Potassium 4.2 07/19/2018 01:25 AM  
 Chloride 105 07/19/2018 01:25 AM  
 CO2 28 07/19/2018 01:25 AM  
 Glucose 117 (H) 07/19/2018 01:25 AM  
 BUN 15 07/19/2018 01:25 AM  
 Creatinine 0.88 07/19/2018 01:25 AM  
 GFR est AA >60 07/19/2018 01:25 AM  
 GFR est non-AA >60 07/19/2018 01:25 AM  
 Calcium 8.0 (L) 07/19/2018 01:25 AM  
 
Lab Results Component Value Date/Time Bilirubin, total 1.0 07/19/2018 01:25 AM  
 ALT (SGPT) 64 07/19/2018 01:25 AM  
 AST (SGOT) 84 (H) 07/19/2018 01:25 AM  
 Alk. phosphatase 159 (H) 07/19/2018 01:25 AM  
 Protein, total 5.9 (L) 07/19/2018 01:25 AM  
 Albumin 2.3 (L) 07/19/2018 01:25 AM  
 Globulin 3.6 07/19/2018 01:25 AM  
 
 
7/15/2018 CT PELV W CONT IMPRESSION: No pelvic abscess identified. 
   
7/15/2018 XR CHEST IMPRESSION:  
  
New development of mild to moderate interstitial edema. Right perihilar airspace 
disease which may represent asymmetric pulmonary edema versus pneumonia. Assessment/Plan 1) Fever and darby rectal abscess S/p I & D,  
abx coverage:   Vancomycin and zosyn 2) Myelofibrosis Follows with heme/onc at [facility] : Dr Lee Began S/p BMT in 2013, relapsed and since on Jakafi at 15 mg BID Primary oncologist / Dr Gini Hernandez recommending resuming Jakafi at half dose; resumed at 15 mg daily. Will continue. Should not interfere with wound healing as long as wbc does not drop into the leukopenia range. Continue with daily CBC Recommend follow up with primary heme/onc at [facility] upon discharge: informed to make an appointment within a week from discharge 3) Thrombocytopenia Secondary to MF, Jakafi and acute infection Monitor 4) Anemia (s/p 2 units PRBCs) Secondary to MF Hgb stable Minimize transfusions and consider only if Hgb < 7 g/dl 5) Leukocytosis Secondary to MF and acute infection Blasts 5%- can be a result of acute infection. < 10% blasts can be seen with MF and does not indicate leukemic transformation. Blasts improved from what was seen from Dr. Villaseñor  office Continue to monitor 6) Iron overload Hold Exjade 7) Hypokalemia Received repletion Continue to monitor 8) Dispo In the process of moving: upon discharge will be going to new home in [facility] [place] Discussed with CM in case home health is needed at discharge.  
 
 
 
Signed By: Richard Nguyen MD

## 2018-07-19 NOTE — PROGRESS NOTES
Beginning of shift:  Bedside and Verbal shift change report given to Lalo Garsia RN (oncoming nurse) by Viet Clifford RN (offgoing nurse). Report included the following information SBAR, Kardex, Procedure Summary, MAR, Med Rec Status and Cardiac Rhythm NSR.

## 2018-07-19 NOTE — PROGRESS NOTES
Daily Progress Note: 7/19/2018  Sloane Montana MD    Assessment/Plan:   Sepsis POA due to below:Fever (7/13/2018)/  Leukocytosis (7/14/2018)/  SIRS (systemic inflammatory response syndrome) ([facility] (7/14/2018): in the setting of immunosuppression.   --broad spectrum abx - pip-tazo      Rhona-rectal abscess (7/14/2018): left side. Likely source of fever. IV antibiotics as above. S/p I+D on 7/14; wound cultures pending  --per surgery  --pelvic CT did not show pelvic abscess  --continue catheter     Myelofibrosis ([facility] (7/14/2018)/ Anemia/Thrombocytopenia ([facility] (7/14/2018): follows up at [facility]. Hgb up to 8s after 2 units of PRBCs 7/14  --heme following     Hypokalemia (7/14/2018): likely from vomiting. Replete and follow K+     Nausea and vomiting (7/14/2018): likely from infection as above. Hydrate. IV anti-emetics. Monitor    Constipation  --Stool softeners daily  --Stop Miralax as this has not been effective in the past     Code Status:  Full     Problem List:  Problem List as of 7/19/2018  Date Reviewed: 7/14/2018          Codes Class Noted - Resolved    Myelofibrosis ([facility] ICD-10-CM: D75.81  ICD-9-CM: 289.83  7/14/2018 - Present        Thrombocytopenia ([facility] ICD-10-CM: D69.6  ICD-9-CM: 287.5  7/14/2018 - Present        Hypokalemia ICD-10-CM: E87.6  ICD-9-CM: 276.8  7/14/2018 - Present        Leukocytosis ICD-10-CM: T04.159  ICD-9-CM: 288.60  7/14/2018 - Present        Nausea and vomiting ICD-10-CM: R11.2  ICD-9-CM: 787.01  7/14/2018 - Present        SIRS (systemic inflammatory response syndrome) ([facility] ICD-10-CM: R65.10  ICD-9-CM: 995.90  7/14/2018 - Present        Rhona-rectal abscess ICD-10-CM: K61.1  ICD-9-CM: 581  7/14/2018 - Present        * (Principal)Fever ICD-10-CM: R50.9  ICD-9-CM: 780.60  7/13/2018 - Present              Subjective:   Ms. Charles Green is a 75 y.o. female who is being admitted for Fever. Ms. Charles Green presented to our Emergency Department today complaining of intermittent fever and chills associated with generalized weakness. She has also been having nausea and vomiting but denies any headaches, flu like or respiratory symptoms, No abdominal discomfort or diarrhea. She has not has any urinary symptoms. No overt focal symptoms. She does have a Hx of myelofibrosis and follows up at [facility]. Work up thus far has been unremarkable. In light of her immunosuppression, she will be admitted for further management. [Dr Ayaz Fischer     : Pt found to have perirectal abscess. Afebrile since 5AM.  Still nauseated. NPO for I+D in OR today with Dr Collette Rowe. 7/15: continues to be nauseated. Labs pending this AM.  Surgeon doesn't feel it is much better despite I+D yesterday. Pt says pain is better controlled with percocet. 18: K+ 2.6. Repleting with IV. (6 total) WBC a little better. Tmax 98.8.     :  Pt reports she is feeling somewhat better. Dr Phong Grimaldo from ([address] [place] [address]) called last night (see note from last night) and wanted Mario 2 inhibitor restarted at 1/2 dose and weaned off from there. WBC remains 15K. K+ back down - replacing. Tmax 101.9. Tnow 98.6. Diuresed about a liter overnight. Checking Mag also. :  Reports \"starting to feel better. \"  Little pain from surg site. Tmax 99. K+ 3.1 - cont to replace. WBC remains at 15K. LFTs sl up - cont to monitor. 18: Less pain. Finally had a BM and feels better. WBC still up. Tmax 99.7    Review of Systems:   A comprehensive review of systems was negative except for that written in the HPI.     Objective:   Physical Exam:     Visit Vitals    /75 (BP 1 Location: Left arm, BP Patient Position: At rest;Supine)    Pulse 74    Temp 98.8 °F (37.1 °C)    Resp 16    Ht 5' 6\" (1.676 m)    Wt 160 lb 11.2 oz (72.9 kg)    SpO2 93%    BMI 25.94 kg/m2    O2 Flow Rate (L/min): 2 l/min O2 Device: Room air    Temp (24hrs), Av.9 °F (37.2 °C), Min:98.3 °F (36.8 °C), Max:99.7 °F (37.6 °C)         1901 - 07/19 0700  In: 800 [I.V.:800]  Out: 5250 [Urine:5250]    General:  Alert, cooperative, no distress, appears stated age   Head:  Normocephalic, without obvious abnormality, atraumatic. Eyes:  Conjunctivae/corneas clear. PERRL, EOMs intact. Nose: Nares normal. Septum midline. Throat: Lips, mucosa, and tongue dry   Neck: Supple, symmetrical, trachea midline, no adenopathy, thyroid: no enlargement/tenderness/nodules, no carotid bruit and no JVD. Back:   Symmetric, no curvature. ROM normal. No CVA tenderness. Lungs:   A few basilar crackles bilaterally. Chest wall:  No tenderness or deformity. Heart:  Regular rate and rhythm, S1, S2 normal, 1/9 ASHA, click, rub or gallop. Abdomen:   Soft, non-tender. Bowel sounds normal. No masses,  No organomegaly. : corcoran in place, L>R labia indurated and red, L glut and perirectal area indurated   Extremities: Extremities normal, atraumatic, no cyanosis or edema. No calf tenderness or cords. Pulses: 2+ and symmetric all extremities. Skin: Skin color, texture, turgor normal. No rashes or lesions   Neurologic: CNII-XII intact. Alert and oriented X 3. Fine motor of hands and fingers normal.   equal.  Gait not tested at this time. Sensation grossly normal to touch.   Gross motor of extremities normal.       Data Review:       Recent Days:  Recent Labs      07/19/18   0125  07/18/18   0154  07/17/18   0134   WBC  16.3*  15.3*  15.4*   HGB  9.3*  9.4*  9.2*   HCT  29.8*  29.1*  28.2*   PLT  40*  41*  40*     Recent Labs      07/19/18   0125  07/18/18   0154  07/17/18   1350  07/17/18   0134   NA  137  140   --   138   K  4.2  3.1*   --   2.5*   CL  105  102   --   101   CO2  28  32   --   26   GLU  117*  106*   --   147*   BUN  15  20   --   14   CREA  0.88  1.02   --   1.01   CA  8.0*  8.1*   --   7.9*   MG  1.9  1.9   --   2.0   ALB  2.3*  2.3*  2.4*   --    SGOT  84*  97*  85*   --    ALT  64  59  51   --      No results for input(s): PH, PCO2, PO2, HCO3, FIO2 in the last 72 hours. 24 Hour Results:  Recent Results (from the past 24 hour(s))   CBC WITH AUTOMATED DIFF    Collection Time: 07/19/18  1:25 AM   Result Value Ref Range    WBC 16.3 (H) 3.6 - 11.0 K/uL    RBC 3.17 (L) 3.80 - 5.20 M/uL    HGB 9.3 (L) 11.5 - 16.0 g/dL    HCT 29.8 (L) 35.0 - 47.0 %    MCV 94.0 80.0 - 99.0 FL    MCH 29.3 26.0 - 34.0 PG    MCHC 31.2 30.0 - 36.5 g/dL    RDW 20.1 (H) 11.5 - 14.5 %    PLATELET 40 (LL) 594 - 400 K/uL    MPV ABNORMAL 8.9 - 12.9 FL    NRBC 3.1 (H) 0  WBC    ABSOLUTE NRBC 0.50 (H) 0.00 - 0.01 K/uL    NEUTROPHILS 58 32 - 75 %    BAND NEUTROPHILS 4 0 - 6 %    LYMPHOCYTES 8 (L) 12 - 49 %    MONOCYTES 26 (H) 5 - 13 %    EOSINOPHILS 0 0 - 7 %    BASOPHILS 0 0 - 1 %    METAMYELOCYTES 1 (H) 0 %    MYELOCYTES 3 (H) 0 %    IMMATURE GRANULOCYTES 0 %    ABS. NEUTROPHILS 10.1 (H) 1.8 - 8.0 K/UL    ABS. LYMPHOCYTES 1.3 0.8 - 3.5 K/UL    ABS. MONOCYTES 4.2 (H) 0.0 - 1.0 K/UL    ABS. EOSINOPHILS 0.0 0.0 - 0.4 K/UL    ABS. BASOPHILS 0.0 0.0 - 0.1 K/UL    ABS. IMM. GRANS. 0.0 K/UL    DF MANUAL      RBC COMMENTS ANISOCYTOSIS  1+        RBC COMMENTS STOMATOCYTES  2+       METABOLIC PANEL, COMPREHENSIVE    Collection Time: 07/19/18  1:25 AM   Result Value Ref Range    Sodium 137 136 - 145 mmol/L    Potassium 4.2 3.5 - 5.1 mmol/L    Chloride 105 97 - 108 mmol/L    CO2 28 21 - 32 mmol/L    Anion gap 4 (L) 5 - 15 mmol/L    Glucose 117 (H) 65 - 100 mg/dL    BUN 15 6 - 20 MG/DL    Creatinine 0.88 0.55 - 1.02 MG/DL    BUN/Creatinine ratio 17 12 - 20      GFR est AA >60 >60 ml/min/1.73m2    GFR est non-AA >60 >60 ml/min/1.73m2    Calcium 8.0 (L) 8.5 - 10.1 MG/DL    Bilirubin, total 1.0 0.2 - 1.0 MG/DL    ALT (SGPT) 64 12 - 78 U/L    AST (SGOT) 84 (H) 15 - 37 U/L    Alk.  phosphatase 159 (H) 45 - 117 U/L    Protein, total 5.9 (L) 6.4 - 8.2 g/dL    Albumin 2.3 (L) 3.5 - 5.0 g/dL    Globulin 3.6 2.0 - 4.0 g/dL    A-G Ratio 0.6 (L) 1.1 - 2.2     MAGNESIUM    Collection Time: 07/19/18  1:25 AM   Result Value Ref Range    Magnesium 1.9 1.6 - 2.4 mg/dL   NT-PRO BNP    Collection Time: 07/19/18  1:25 AM   Result Value Ref Range    NT pro- (H) 0 - 125 PG/ML       Medications reviewed  Current Facility-Administered Medications   Medication Dose Route Frequency    senna (SENOKOT) tablet 43 mg  5 Tab Oral QHS    potassium chloride (KLOR-CON) packet 20 mEq  20 mEq Oral TID WITH MEALS    polyethylene glycol (MIRALAX) packet 17 g  17 g Oral DAILY    vancomycin (VANCOCIN) 1,000 mg in 0.9% sodium chloride (MBP/ADV) 250 mL  1,000 mg IntraVENous Q12H    piperacillin-tazobactam (ZOSYN) 3.375 g in 0.9% sodium chloride (MBP/ADV) 100 mL  3.375 g IntraVENous Q8H    ruxolitinib tab 15 mg (Patient Supplied)  15 mg Oral DAILY    aluminum-magnesium hydroxide (MAALOX) oral suspension 30 mL  30 mL Oral QID PRN    promethazine (PHENERGAN) 25 mg in NS IVPB  25 mg IntraVENous Q8H PRN    senna-docusate (PERICOLACE) 8.6-50 mg per tablet 1 Tab  1 Tab Oral DAILY    pantoprazole (PROTONIX) tablet 40 mg  40 mg Oral ACB    oxyCODONE-acetaminophen (PERCOCET) 5-325 mg per tablet 1 Tab  1 Tab Oral Q4H PRN    HYDROmorphone (DILAUDID) tablet 1 mg  1 mg Oral Q4H PRN    sodium chloride (NS) flush 5-10 mL  5-10 mL IntraVENous PRN    sodium chloride (NS) flush 5-10 mL  5-10 mL IntraVENous Q8H    sodium chloride (NS) flush 5-10 mL  5-10 mL IntraVENous PRN    acetaminophen (TYLENOL) tablet 650 mg  650 mg Oral Q4H PRN    ondansetron (ZOFRAN) injection 4 mg  4 mg IntraVENous Q4H PRN    0.9% sodium chloride infusion 250 mL  250 mL IntraVENous PRN     Care Plan discussed with: Patient and Nurse  Total time spent with patient: 30 minutes.     David Ozuna MD

## 2018-07-19 NOTE — PROGRESS NOTES
Problem: Mobility Impaired (Adult and Pediatric)  Goal: *Acute Goals and Plan of Care (Insert Text)  Physical Therapy Goals  Initiated 7/19/2018  1. Patient will transfer from bed to chair and chair to bed with modified independence using the least restrictive device within 3 day(s). 3.  Patient will perform sit to stand with modified independence within 3 day(s). 4.  Patient will ambulate with modified independence for 300 feet with the least restrictive device within 3 day(s). 5.  Patient will ascend/descend 5 stairs with 1 handrail(s) with modified independence within 3 day(s). physical Therapy EVALUATION  Patient: Padma Edmonds (31 y.o. female)  Date: 7/19/2018  Primary Diagnosis: Fever  PERIRECTAL ABSCESS  Procedure(s) (LRB):  INCISION AND DRAINAGE, EXCISIONAL DEBRIDEMENT OF PERIRECTAL ABSCESS (N/A) 5 Days Post-Op   Precautions: fall; cardiac       ASSESSMENT :  Based on the objective data described below, the patient presents with generalized weakness and reduced activity tolerance impacting functional mobility. At present she requires min A for bed mobility and transitions. Feels very insecure without an assistive device, reaching out for stabilization while upright. Began training with rolling walker. No prior use of assistive devices or falls, tho does note some balance insecurity. Lives with disabled , both active on a small farm. Will benefit from PT in [address] [place] [address], and initially upon return home (new residence). May need a walker at discharge; will reassess  tomorrow. Patient will benefit from skilled intervention to address the above impairments.   Patients rehabilitation potential is considered to be Excellent  Factors which may influence rehabilitation potential include:   [x]         None noted  []         Mental ability/status  []         Medical condition  []         Home/family situation and support systems  []         Safety awareness  []         Pain tolerance/management  [] Other: PLAN :  Recommendations and Planned Interventions:  []           Bed Mobility Training             []    Neuromuscular Re-Education  [x]           Transfer Training                   []    Orthotic/Prosthetic Training  [x]           Gait Training                         []    Modalities  [x]           Therapeutic Exercises           []    Edema Management/Control  [x]           Therapeutic Activities            [x]    Patient and Family Training/Education  []           Other (comment):    Frequency/Duration: Patient will be followed by physical therapy  5 times a week to address goals. Discharge Recommendations: Home Health  Further Equipment Recommendations for Discharge: rolling walker possible     SUBJECTIVE:   Patient stated I think I'll need a walker.     OBJECTIVE DATA SUMMARY:   HISTORY:    Past Medical History:   Diagnosis Date    Myelofibrosis ([facility]      BMT in 2013 for MF but relapsed soon after. Has since been on Jakafi and follows with Hematology at [facility]     Past Surgical History:   Procedure Laterality Date    HX BONE MARROW TRANSPLANT      HX VASCULAR ACCESS      right portacath     Prior Level of Function/Home Situation: see above & case mgmt. Personal factors and/or comorbidities impacting plan of care: very active; myelofibrosis    Home Situation  Home Environment: Private residence  # Steps to Enter: 5  One/Two Story Residence: One story  Living Alone: No  Support Systems: Child(hemant), Family member(s), Spouse/Significant Other/Partner  Patient Expects to be Discharged to[de-identified] Private residence  Current DME Used/Available at Home: None (walker ,cane for her )    EXAMINATION/PRESENTATION/DECISION MAKING:   Critical Behavior:   axox4           Hearing: Auditory  Auditory Impairment: None  Skin:  No problem noted  Edema: as above  Range Of Motion:  AROM: Within functional limits                       Strength:    Strength:  Within functional limits                    Tone & Sensation:   Tone: Normal              Sensation: Intact               Coordination:  Coordination: Within functional limits  Vision:      Functional Mobility:  Bed Mobility:  Rolling: Modified independent  Supine to Sit: Minimum assistance  Sit to Supine: Minimum assistance  Scooting: Modified independent  Transfers:  Sit to Stand: Contact guard assistance  Stand to Sit: Contact guard assistance                       Balance:   Sitting: Intact  Standing: Intact; With support  Ambulation/Gait Training:  Distance (ft): 80 Feet (ft)  Assistive Device: Walker, rolling  Ambulation - Level of Assistance: Contact guard assistance                       Speed/Dee Dee: Slow  Step Length: Right shortened;Left shortened                 Therapeutic Exercises:   Seated exercises prior to standing    Functional Measure:  Tinetti test:    Sitting Balance: 1  Arises: 1  Attempts to Rise: 2  Immediate Standing Balance: 1  Standing Balance: 1  Nudged: 2  Eyes Closed: 1  Turn 360 Degrees - Continuous/Discontinuous: 1  Turn 360 Degrees - Steady/Unsteady: 1  Sitting Down: 1  Balance Score: 12  Indication of Gait: 0  R Step Length/Height: 1  L Step Length/Height: 1  R Foot Clearance: 1  L Foot Clearance: 1  Step Symmetry: 1  Step Continuity: 1  Path: 1  Trunk: 0  Walking Time: 0  Gait Score: 7  Total Score: 19       Tinetti Test and G-code impairment scale:  Percentage of Impairment CH    0%   CI    1-19% CJ    20-39% CK    40-59% CL    60-79% CM    80-99% CN     100%   Tinetti  Score 0-28 28 23-27 17-22 12-16 6-11 1-5 0       Tinetti Tool Score Risk of Falls  <19 = High Fall Risk  19-24 = Moderate Fall Risk  25-28 = Low Fall Risk  Tinetti ME. Performance-Oriented Assessment of Mobility Problems in Elderly Patients. Hernandez 66; [id number].  (Scoring Description: PT Bulletin Feb. 10, 1993)    Older adults: Mauricio Asif et al, 2009; n = [address] elderly evaluated with ABC, KARINA, ADL, and IADL)  · Mean KARINA score for males aged 69-68 years = 26.21(3.40)  · Mean KARINA score for females age 69-68 years = 25.16(4.30)  · Mean KARINA score for males over 80 years = 23.29(6.02)  · Mean KARINA score for females over 80 years = 17.20(8.32)       G codes: In compliance with CMSs Claims Based Outcome Reporting, the following G-code set was chosen for this patient based on their primary functional limitation being treated: The outcome measure chosen to determine the severity of the functional limitation was the tinetti with a score of 19/28 which was correlated with the impairment scale. ? Mobility - Walking and Moving Around:     - CURRENT STATUS: CJ - 20%-39% impaired, limited or restricted    - GOAL STATUS: CI - 1%-19% impaired, limited or restricted    - D/C STATUS:  ---------------To be determined---------------      Physical Therapy Evaluation Charge Determination   History Examination Presentation Decision-Making   MEDIUM  Complexity : 1-2 comorbidities / personal factors will impact the outcome/ POC  MEDIUM Complexity : 3 Standardized tests and measures addressing body structure, function, activity limitation and / or participation in recreation  LOW Complexity : Stable, uncomplicated  Other outcome measures tinetti  LOW       Based on the above components, the patient evaluation is determined to be of the following complexity level: LOW     Pain:  Pain Scale 1: Numeric (0 - 10)  Pain Intensity 1: 0              Activity Tolerance:   No problems noted re vitals. No SOB  Please refer to the flowsheet for vital signs taken during this treatment. After treatment:   []         Patient left in no apparent distress sitting up in chair  [x]         Patient left in no apparent distress in bed  [x]         Call bell left within reach  [x]         Nursing notified  []         Caregiver present  []         Bed alarm activated    COMMUNICATION/EDUCATION:   The patients plan of care was discussed with: Registered Nurse.   [x]         Fall prevention education was provided and the patient/caregiver indicated understanding. [x]         Patient/family have participated as able in goal setting and plan of care. []         Patient/family agree to work toward stated goals and plan of care. []         Patient understands intent and goals of therapy, but is neutral about his/her participation. []         Patient is unable to participate in goal setting and plan of care.     Thank you for this referral.  July Wilkerson, PT   Time Calculation: 32 mins

## 2018-07-19 NOTE — PROGRESS NOTES
Progress Note    Patient: Luís Moser MRN: [id number]  SSN: [id number]    YOB: 1945  Age: 75 y.o. Sex: female      Admit Date: 2018    5 Days Post-Op    Procedure:  Procedure(s):  INCISION AND DRAINAGE, EXCISIONAL DEBRIDEMENT OF PERIRECTAL ABSCESS    Subjective:     Mrs. Genevie Shone feels better. She denies any pain. Objective:     Visit Vitals    /72 (BP 1 Location: Left arm, BP Patient Position: At rest)    Pulse 80    Temp 98.3 °F (36.8 °C)    Resp 16    Ht 5' 6\" (1.676 m)    Wt 160 lb 11.2 oz (72.9 kg)    SpO2 96%    BMI 25.94 kg/m2       Temp (24hrs), Av.8 °F (37.1 °C), Min:98.3 °F (36.8 °C), Max:99.7 °F (37.6 °C)      Physical Exam:    GENERAL: alert, cooperative, no distress, SKIN: The perianal wound is clean. There is less erythema and mild desquamation.      Lab Review:   BMP:   Lab Results   Component Value Date/Time     2018 01:25 AM    K 4.2 2018 01:25 AM     2018 01:25 AM    CO2 28 2018 01:25 AM    AGAP 4 (L) 2018 01:25 AM     (H) 2018 01:25 AM    BUN 15 2018 01:25 AM    CREA 0.88 2018 01:25 AM    GFRAA >60 2018 01:25 AM    GFRNA >60 2018 01:25 AM     CBC:   Lab Results   Component Value Date/Time    WBC 16.3 (H) 2018 01:25 AM    HGB 9.3 (L) 2018 01:25 AM    HCT 29.8 (L) 2018 01:25 AM    PLT 40 (LL) 2018 01:25 AM       Assessment:     Hospital Problems  Date Reviewed: 2018          Codes Class Noted POA    Myel[facility] ([facility] ICD-10-CM: N77.05  ICD-9-CM: 289.83  2018 Yes        Thrombocytopenia ([address] ICD-10-CM: D69.6  ICD-9-CM: 287.5  2018 Yes        Hypokalemia ICD-10-CM: E87.6  ICD-9-CM: 276.8  2018 Yes        Leukocytosis ICD-10-CM: Y61.589  ICD-9-CM: 288.60  2018 Yes        Nausea and vomiting ICD-10-CM: R11.2  ICD-9-CM: 787.01  2018 Yes        SIRS (systemic inflammatory response syndrome) (HCC) ICD-10-CM: R65.10  ICD-9-CM: 995.90  7/14/2018 Yes        Rhona-rectal abscess ICD-10-CM: K61.1  ICD-9-CM: 669  7/14/2018 Yes        * (Principal)Fever ICD-10-CM: R50.9  ICD-9-CM: 780.60  7/13/2018 Yes              Plan/Recommendations/Medical Decision Making:     Continue antibiotics per ID and LWC. Path with abscess and cellulitis in the soft tissue. Home health for dressings being arranged. Will f/u with surgeon in [place] since moving. Will follow as needed.     Signed By: Kimber Rojas MD     July 19, 2018

## 2018-07-19 NOTE — PROGRESS NOTES
0730- Bedside report received patient resting in bed she has a lot of questions night nurse Gracia has answered them no acute concerns to address at this time. ~ TOMER Powell RN   0930- Pt examined this morning, Perianal abscess is without pain or discomfort for the patient although sitz baths have been ordered BID for cleansing. Fran Powell RN      1200- Pt in bed she will do the Sitz bath after lunch and I will change her dressing after. Fran Powell RN     1400- Surgery has come to see patient, and Hem/Onc MD, patient is progressing well per MD's no further interventions at this time, patient has had her sitz bath and I have packed her wound this afternoon. ~ TOMER Powell RN     1700- New IV has been started in right right forearm, IV abx administered, patient resting no acute distress noted. Fran Powell RN     1900- Bedside and Verbal shift change report given to Olga Damon  (oncoming nurse) by Thefaby Powell RN  (offgoing nurse). Report included the following information SBAR, Kardex and Med Rec Status.

## 2018-07-19 NOTE — PROGRESS NOTES
Beginning of shift:  Bedside and Verbal shift change report given to GURWINDER Fagan (oncoming nurse) by Priscilla Hoskins RN (offgoing nurse). Report included the following information SBAR, Kardex, Procedure Summary, Intake/Output, MAR, Accordion, Recent Results, Med Rec Status and Cardiac Rhythm Sinus Rhythm. 1958  Initial assessment completed. Introduced self as primary RN. VSS. She c/o lower abd discomfort at 1/10 and has requested Tylenol to be given w/ her evening medications.  Pt denies additional complaints at this time. Plan for the evening discussed with pt and she has verbalized understanding. Bed locked and in low position with call bell within reach.  Instructed her to press call renteria when needed. White board updated with this RN's name. 0525  Pt has had an uneventful evening for the most part. She has been given 5 tablets of Senakot to assist in her BM's w/o any effects. She has had no complaints. End of shift:  Bedside and Verbal shift change report given to Marquita Broussard RN (oncoming nurse) by GURWINDER Fagan (offgoing nurse). Report included the following information SBAR, Kardex, Procedure Summary, Intake/Output, MAR, Accordion, Recent Results, Med Rec Status and Cardiac Rhythm NSR.

## 2018-07-19 NOTE — PROGRESS NOTES
Problem: Falls - Risk of  Goal: *Absence of Falls  Document Marcus Fall Risk and appropriate interventions in the flowsheet.    Outcome: Progressing Towards Goal  Fall Risk Interventions:  Mobility Interventions: Patient to call before getting OOB    Mentation Interventions: Adequate sleep, hydration, pain control    Medication Interventions: Teach patient to arise slowly    Elimination Interventions: Patient to call for help with toileting needs    History of Falls Interventions: Door open when patient unattended

## 2018-07-20 LAB
ALBUMIN SERPL-MCNC: 2.5 G/DL (ref 3.5–5)
ALBUMIN/GLOB SERPL: 0.6 {RATIO} (ref 1.1–2.2)
ALP SERPL-CCNC: 159 U/L (ref 45–117)
ALT SERPL-CCNC: 60 U/L (ref 12–78)
ANION GAP SERPL CALC-SCNC: 7 MMOL/L (ref 5–15)
AST SERPL-CCNC: 61 U/L (ref 15–37)
BASOPHILS # BLD: 0.2 K/UL (ref 0–0.1)
BASOPHILS NFR BLD: 1 % (ref 0–1)
BILIRUB SERPL-MCNC: 0.9 MG/DL (ref 0.2–1)
BUN SERPL-MCNC: 13 MG/DL (ref 6–20)
BUN/CREAT SERPL: 15 (ref 12–20)
CALCIUM SERPL-MCNC: 8.2 MG/DL (ref 8.5–10.1)
CHLORIDE SERPL-SCNC: 105 MMOL/L (ref 97–108)
CO2 SERPL-SCNC: 25 MMOL/L (ref 21–32)
CREAT SERPL-MCNC: 0.85 MG/DL (ref 0.55–1.02)
DIFFERENTIAL METHOD BLD: ABNORMAL
EOSINOPHIL # BLD: 0 K/UL (ref 0–0.4)
EOSINOPHIL NFR BLD: 0 % (ref 0–7)
ERYTHROCYTE [DISTWIDTH] IN BLOOD BY AUTOMATED COUNT: 19.9 % (ref 11.5–14.5)
GLOBULIN SER CALC-MCNC: 4 G/DL (ref 2–4)
GLUCOSE SERPL-MCNC: 99 MG/DL (ref 65–100)
HCT VFR BLD AUTO: 30.5 % (ref 35–47)
HGB BLD-MCNC: 9.6 G/DL (ref 11.5–16)
IMM GRANULOCYTES # BLD: 0 K/UL
IMM GRANULOCYTES NFR BLD AUTO: 0 %
LYMPHOCYTES # BLD: 2.9 K/UL (ref 0.8–3.5)
LYMPHOCYTES NFR BLD: 14 % (ref 12–49)
MAGNESIUM SERPL-MCNC: 1.8 MG/DL (ref 1.6–2.4)
MCH RBC QN AUTO: 29.8 PG (ref 26–34)
MCHC RBC AUTO-ENTMCNC: 31.5 G/DL (ref 30–36.5)
MCV RBC AUTO: 94.7 FL (ref 80–99)
METAMYELOCYTES NFR BLD MANUAL: 7 %
MONOCYTES # BLD: 1 K/UL (ref 0–1)
MONOCYTES NFR BLD: 5 % (ref 5–13)
MYELOCYTES NFR BLD MANUAL: 7 %
NEUTS BAND NFR BLD MANUAL: 9 % (ref 0–6)
NEUTS SEG # BLD: 13.5 K/UL (ref 1.8–8)
NEUTS SEG NFR BLD: 57 % (ref 32–75)
NRBC # BLD: 0.4 K/UL (ref 0–0.01)
NRBC BLD-RTO: 2 PER 100 WBC
PLATELET # BLD AUTO: 54 K/UL (ref 150–400)
POTASSIUM SERPL-SCNC: 4 MMOL/L (ref 3.5–5.1)
PROT SERPL-MCNC: 6.5 G/DL (ref 6.4–8.2)
RBC # BLD AUTO: 3.22 M/UL (ref 3.8–5.2)
RBC MORPH BLD: ABNORMAL
RBC MORPH BLD: ABNORMAL
SODIUM SERPL-SCNC: 137 MMOL/L (ref 136–145)
WBC # BLD AUTO: 20.5 K/UL (ref 3.6–11)

## 2018-07-20 PROCEDURE — 85025 COMPLETE CBC W/AUTO DIFF WBC: CPT | Performed by: FAMILY MEDICINE

## 2018-07-20 PROCEDURE — 74011250637 HC RX REV CODE- 250/637: Performed by: INTERNAL MEDICINE

## 2018-07-20 PROCEDURE — 74011250636 HC RX REV CODE- 250/636: Performed by: INTERNAL MEDICINE

## 2018-07-20 PROCEDURE — 74011250637 HC RX REV CODE- 250/637: Performed by: FAMILY MEDICINE

## 2018-07-20 PROCEDURE — 80053 COMPREHEN METABOLIC PANEL: CPT | Performed by: FAMILY MEDICINE

## 2018-07-20 PROCEDURE — 74011000258 HC RX REV CODE- 258: Performed by: INTERNAL MEDICINE

## 2018-07-20 PROCEDURE — 65660000000 HC RM CCU STEPDOWN

## 2018-07-20 PROCEDURE — 83735 ASSAY OF MAGNESIUM: CPT | Performed by: FAMILY MEDICINE

## 2018-07-20 PROCEDURE — 36415 COLL VENOUS BLD VENIPUNCTURE: CPT | Performed by: FAMILY MEDICINE

## 2018-07-20 RX ADMIN — PIPERACILLIN SODIUM,TAZOBACTAM SODIUM 3.38 G: 3; .375 INJECTION, POWDER, FOR SOLUTION INTRAVENOUS at 06:28

## 2018-07-20 RX ADMIN — PIPERACILLIN SODIUM,TAZOBACTAM SODIUM 3.38 G: 3; .375 INJECTION, POWDER, FOR SOLUTION INTRAVENOUS at 23:18

## 2018-07-20 RX ADMIN — ACETAMINOPHEN 650 MG: 325 TABLET ORAL at 23:19

## 2018-07-20 RX ADMIN — Medication 10 ML: at 16:23

## 2018-07-20 RX ADMIN — VANCOMYCIN HYDROCHLORIDE 1000 MG: 1 INJECTION, POWDER, LYOPHILIZED, FOR SOLUTION INTRAVENOUS at 16:29

## 2018-07-20 RX ADMIN — PIPERACILLIN SODIUM,TAZOBACTAM SODIUM 3.38 G: 3; .375 INJECTION, POWDER, FOR SOLUTION INTRAVENOUS at 16:23

## 2018-07-20 RX ADMIN — Medication 10 ML: at 06:29

## 2018-07-20 RX ADMIN — POTASSIUM CHLORIDE 20 MEQ: 1.5 POWDER, FOR SOLUTION ORAL at 17:41

## 2018-07-20 RX ADMIN — VANCOMYCIN HYDROCHLORIDE 1000 MG: 1 INJECTION, POWDER, LYOPHILIZED, FOR SOLUTION INTRAVENOUS at 03:45

## 2018-07-20 RX ADMIN — PANTOPRAZOLE SODIUM 40 MG: 40 TABLET, DELAYED RELEASE ORAL at 06:30

## 2018-07-20 RX ADMIN — POTASSIUM CHLORIDE 20 MEQ: 1.5 POWDER, FOR SOLUTION ORAL at 11:38

## 2018-07-20 NOTE — PROGRESS NOTES
7- CASE MANAGEMENT NOTE:  Per pt's first choice, I faxed the home health referral to [facility] in [place] but they would not guarantee to see the pt within 24 hours of discharge or daily wound care. I did some extenisive investigation and finally was able to send the referral to [address] [place] [place] in [place] and they have accepted. I also obtained a rolling walker from [facility] [address] and our DME closet. No further discharge needs identified. Care Management Interventions  PCP Verified by CM:  Yes (Dr. Nick Franco nurse navigator)  Transition of Care Consult (CM Consult): [address]: No  Reason Outside [place]: Out of service area  Discharge Durable Medical Equipment: Yes (Naye Guzmán from [place])  Physical Therapy Consult: Yes  Occupational Therapy Consult: Yes  Speech Therapy Consult: No  Current Support Network: Lives with Spouse, Own Home  Confirm Follow Up Transport: Family  Plan discussed with Pt/Family/Caregiver: Yes  Freedom of Choice Offered: Yes  Discharge Location  Discharge Placement:  (Home with familly and [facility] from [place])    [address], [place]

## 2018-07-20 NOTE — PROGRESS NOTES
[facility] [place] at [address] [facility] [address] [facility] W: [phone]  F: [phone] Reason for Visit:  
Hari Daigle is a 75 y.o. female who is seen in consultation at the request of Dr. Jazmine Abdul for evaluation of Myelofibrosis. History of Present Illness:  
Patient is a 75 y. o. with PMF who is admitted on 7/14/18 with c/o weakness, N/V and intermittent fevers x 5 days. She has a known h/o UTIs. Noted to have anemia, thrombocytopenia and leucocytosis on admission. CXR and UA unremarkable. She was started on Levaquin and Vancomycin and underwent I & D for a perirectal abscess on 7/14/18. She states that she underwent a BMT in 2013 for MF but relapsed soon after. Has since been on Jakafi and follows with Hematology at [facility]. She has also been on Exjade for transfusion iron overload. Does not think she has an enlarged spleen. She relocated from [facility] last year but was in the process of moving back later in this week. In the last 3-4 days she noted weakness, confusion, intermittent fevers and urinary incontinence. 1 Day prior to presentation she thought she felt a swelling in her rectal area. Hence she presented to the ER when she was found to have a temp of 102 F Since the I and D she has had a Tmax of 100F. Has better alertness, still has pain in the perirectal area, has no chills or bleeding. Notes some nausea x 1 day. No CP, SOB, Cough, dysuria. Has not had a BM since 3 days but has no abdominal discomfort Interval History:  
Feeling better each day; appetite good; has had a BM 
 
afebrile Follows with Dr Qamar Leonard/oncologist at [phone]); I have spoken with her Past Medical History:  
Diagnosis Date  Myelofibrosis ([address] BMT in 2013 for MF but relapsed soon after. Has since been on Jakafi and follows with Hematology at [facility] Past Surgical History:  
Procedure Laterality Date  HX BONE MARROW TRANSPLANT  HX VASCULAR ACCESS    
 right portacath Social History Substance Use Topics  Smoking status: Never Smoker  Smokeless tobacco: Never Used  Alcohol use Yes Comment: seldom Family History Problem Relation Age of Onset  Heart Attack Mother Current Facility-Administered Medications Medication Dose Route Frequency  senna-docusate (PERICOLACE) 8.6-50 mg per tablet 1 Tab  1 Tab Oral BID  senna (SENOKOT) tablet 43 mg  5 Tab Oral QHS  potassium chloride (KLOR-CON) packet 20 mEq  20 mEq Oral TID WITH MEALS  vancomycin (VANCOCIN) 1,000 mg in 0.9% sodium chloride (MBP/ADV) 250 mL  1,000 mg IntraVENous Q12H  piperacillin-tazobactam (ZOSYN) 3.375 g in 0.9% sodium chloride (MBP/ADV) 100 mL  3.375 g IntraVENous Q8H  
 ruxolitinib tab 15 mg (Patient Supplied)  15 mg Oral DAILY  aluminum-magnesium hydroxide (MAALOX) oral suspension 30 mL  30 mL Oral QID PRN  promethazine (PHENERGAN) 25 mg in NS IVPB  25 mg IntraVENous Q8H PRN  pantoprazole (PROTONIX) tablet 40 mg  40 mg Oral ACB  oxyCODONE-acetaminophen (PERCOCET) 5-325 mg per tablet 1 Tab  1 Tab Oral Q4H PRN  
 HYDROmorphone (DILAUDID) tablet 1 mg  1 mg Oral Q4H PRN  
 sodium chloride (NS) flush 5-10 mL  5-10 mL IntraVENous PRN  
 sodium chloride (NS) flush 5-10 mL  5-10 mL IntraVENous Q8H  
 sodium chloride (NS) flush 5-10 mL  5-10 mL IntraVENous PRN  
 acetaminophen (TYLENOL) tablet 650 mg  650 mg Oral Q4H PRN  
 ondansetron (ZOFRAN) injection 4 mg  4 mg IntraVENous Q4H PRN  
 0.9% sodium chloride infusion 250 mL  250 mL IntraVENous PRN No Known Allergies Review of Systems: A complete review of systems was obtained, negative except as described above. Physical Exam:  
 
Visit Vitals  /71 (BP 1 Location: Right arm, BP Patient Position: At rest;Post activity;Supine; Head of bed elevated (Comment degrees))  Pulse 77  Temp 98.4 °F (36.9 °C)  Resp 16  
 Ht 5' 6\" (1.676 m)  Wt 157 lb 1.6 oz (71.3 kg)  SpO2 95%  BMI 25.36 kg/m2 ECOG PS: 2 General: Nauseous but otherwise NAD Eyes: PERRLA, anicteric sclerae HENT: Atraumatic, OP clear Neck: Supple Respiratory: CTAB, normal respiratory effort CV:  regular rhythm, no murmurs, no peripheral edema GI: Soft, nontender, nondistended, no masses, no hepatomegaly, no splenomegaly MS:  Digits without clubbing or cyanosis. Skin: No rashes, ecchymoses, or petechiae. Normal temperature, turgor, and texture. Psych: Alert, oriented, appropriate affect, normal judgment/insight External genitalia Erythematous vulva. Packing noted Results:  
 
Lab Results Component Value Date/Time WBC 20.5 (H) 07/20/2018 04:02 AM  
 HGB 9.6 (L) 07/20/2018 04:02 AM  
 HCT 30.5 (L) 07/20/2018 04:02 AM  
 PLATELET 54 (L) 70/60/2006 04:02 AM  
 MCV 94.7 07/20/2018 04:02 AM  
 ABS. NEUTROPHILS 13.5 (H) 07/20/2018 04:02 AM  
 
Lab Results Component Value Date/Time Sodium 137 07/20/2018 04:02 AM  
 Potassium 4.0 07/20/2018 04:02 AM  
 Chloride 105 07/20/2018 04:02 AM  
 CO2 25 07/20/2018 04:02 AM  
 Glucose 99 07/20/2018 04:02 AM  
 BUN 13 07/20/2018 04:02 AM  
 Creatinine 0.85 07/20/2018 04:02 AM  
 GFR est AA >60 07/20/2018 04:02 AM  
 GFR est non-AA >60 07/20/2018 04:02 AM  
 Calcium 8.2 (L) 07/20/2018 04:02 AM  
 
Lab Results Component Value Date/Time Bilirubin, total 0.9 07/20/2018 04:02 AM  
 ALT (SGPT) 60 07/20/2018 04:02 AM  
 AST (SGOT) 61 (H) 07/20/2018 04:02 AM  
 Alk. phosphatase 159 (H) 07/20/2018 04:02 AM  
 Protein, total 6.5 07/20/2018 04:02 AM  
 Albumin 2.5 (L) 07/20/2018 04:02 AM  
 Globulin 4.0 07/20/2018 04:02 AM  
 
 
7/15/2018 CT PELV W CONT IMPRESSION: No pelvic abscess identified. 
   
7/15/2018 XR CHEST IMPRESSION:  
  
New development of mild to moderate interstitial edema. Right perihilar airspace 
disease which may represent asymmetric pulmonary edema versus pneumonia. Assessment/Plan 1) Fever and darby rectal abscess S/p I & D,  
abx coverage:   Vancomycin and zosyn Plan per patient is to put her on PO abx over weekend and possible discharge on Monday 2) Myelofibrosis Follows with heme/onc at [facility] : Dr Jass Meier S/p BMT in 2013, relapsed and since on Jakafi at 15 mg BID Primary oncologist / Dr Akins Person recommending resuming Jakafi at half dose; resumed at 15 mg daily. Will continue. Should not interfere with wound healing as long as wbc does not drop into the leukopenia range. Continue with daily CBC Recommend follow up with primary heme/onc at [facility] upon discharge: informed to make an appointment within a week from discharge Rising leukocytosis may be from myelofibrosis and decreasing the dose of the  EVI 3) Thrombocytopenia Secondary to MF, Jakafi and acute infection Monitor 
 
improving 4) Anemia (s/p 2 units PRBCs) Secondary to MF Hgb stable Minimize transfusions and consider only if Hgb < 7 g/dl 5) Leukocytosis Secondary to MF and acute infection Blasts 5%- can be a result of acute infection. < 10% blasts can be seen with MF and does not indicate leukemic transformation. Blasts improved from what was seen from Dr. Lorenzo [facility] office Continue to monitor 6) Iron overload Hold Exjade 7) Hypokalemia Received repletion Continue to monitor 8) Dispo In the process of moving: upon discharge will be going to new home in [facility] [place] Discussed with CM in case home health is needed at discharge.  
 
 
 
Signed By: Baldemar Vieyra MD

## 2018-07-20 NOTE — PROGRESS NOTES
[facility] Progress Note           Tae Daniels DO    [phone] Office  [phone]  Fax    2018      Assessment & Plan:   1. Perirectal abscess. S/p I&D . Cultures growing ecoli. Abx changed to pip-tazo . Patient continues to improve. Will deescalate abx to augmentin/doxy when she is ready for discharge    2. Myelofibrosis. Anemia/Thrombocytopenia Follows up at [facility].    3. Leukocytosis. Related to MF  4. Immunosuppressed host.  Adina Horse restarted at 1/2 dose. 5. CHF. Cardiology following          Subjective:     C/o weakness    Objective:     Vitals:   Visit Vitals    /73 (BP 1 Location: Left arm, BP Patient Position: At rest)    Pulse 84    Temp 99.2 °F (37.3 °C)    Resp 16    Ht 5' 6\" (1.676 m)    Wt 71.3 kg (157 lb 1.6 oz)    SpO2 97%    BMI 25.36 kg/m2        Tmax:  Temp (24hrs), Av.5 °F (36.9 °C), Min:97.8 °F (36.6 °C), Max:99.3 °F (37.4 °C)      Exam:   Patient is intubated:  no    Physical Examination:   General:  Alert, cooperative, no distress   Head:  Normocephalic, atraumatic. Eyes:  Conjunctivae clear   Neck:        Lungs:   No distress. Chest wall:     Heart:     Abdomen:      Extremities: Moves all. Skin:    Neurologic: CNII-XII intact. Labs:        No lab exists for component: ITNL   No results for input(s): CPK, CKMB, TROIQ in the last 72 hours. Recent Labs      18   0402  18   0125  18   0154   NA  137  137  140   K  4.0  4.2  3.1*   CL  105  105  102   CO2  25  28  32   BUN  13  15  20   CREA  0.85  0.88  1.02   GLU  99  117*  106*   MG  1.8  1.9  1.9   ALB  2.5*  2.3*  2.3*   WBC  20.5*  16.3*  15.3*   HGB  9.6*  9.3*  9.4*   HCT  30.5*  29.8*  29.1*   PLT  54*  40*  41*     No results for input(s): INR, PTP, APTT in the last 72 hours.     No lab exists for component: INREXT, INREXT  Needs: urine analysis, urine sodium, protein and creatinine  No results found for: ANKUSH, CREAU      Cultures: No results found for: SDES  Lab Results   Component Value Date/Time    Culture result: LIGHT ESCHERICHIA COLI (A) 07/14/2018 02:04 PM    Culture result: NO ANAEROBES ISOLATED 07/14/2018 02:04 PM    Culture result: NO GROWTH 6 DAYS 07/13/2018 09:36 PM       Radiology:     Medications       Current Facility-Administered Medications   Medication Dose Route Frequency Last Dose    senna-docusate (PERICOLACE) 8.6-50 mg per tablet 1 Tab  1 Tab Oral BID 1 Tab at 07/19/18 2256    senna (SENOKOT) tablet 43 mg  5 Tab Oral QHS 43 mg at 07/19/18 2256    potassium chloride (KLOR-CON) packet 20 mEq  20 mEq Oral TID WITH MEALS 20 mEq at 07/20/18 1138    vancomycin (VANCOCIN) 1,000 mg in 0.9% sodium chloride (MBP/ADV) 250 mL  1,000 mg IntraVENous Q12H 1,000 mg at 07/20/18 0345    piperacillin-tazobactam (ZOSYN) 3.375 g in 0.9% sodium chloride (MBP/ADV) 100 mL  3.375 g IntraVENous Q8H 3.375 g at 07/20/18 8683    ruxolitinib tab 15 mg (Patient Supplied)  15 mg Oral DAILY 15 mg at 07/20/18 1139    aluminum-magnesium hydroxide (MAALOX) oral suspension 30 mL  30 mL Oral QID PRN 30 mL at 07/16/18 2147    promethazine (PHENERGAN) 25 mg in NS IVPB  25 mg IntraVENous Q8H PRN 25 mg at 07/16/18 0919    pantoprazole (PROTONIX) tablet 40 mg  40 mg Oral ACB 40 mg at 07/20/18 0630    oxyCODONE-acetaminophen (PERCOCET) 5-325 mg per tablet 1 Tab  1 Tab Oral Q4H PRN 1 Tab at 07/16/18 0120    HYDROmorphone (DILAUDID) tablet 1 mg  1 mg Oral Q4H PRN      sodium chloride (NS) flush 5-10 mL  5-10 mL IntraVENous PRN      sodium chloride (NS) flush 5-10 mL  5-10 mL IntraVENous Q8H 10 mL at 07/20/18 0629    sodium chloride (NS) flush 5-10 mL  5-10 mL IntraVENous PRN      acetaminophen (TYLENOL) tablet 650 mg  650 mg Oral Q4H  mg at 07/19/18 2301    ondansetron (ZOFRAN) injection 4 mg  4 mg IntraVENous Q4H PRN 4 mg at 07/16/18 0645    0.9% sodium chloride infusion 250 mL  250 mL IntraVENous PRN             Case discussed with:      Unknown Palomino, DO

## 2018-07-20 NOTE — PROGRESS NOTES
0800 Bedside and Verbal shift change report given to [facility] (oncoming nurse) by Marie Champagne (offgoing nurse). Report included the following information SBAR, Kardex, Intake/Output, MAR and Recent Results. 1274 Dressing of buttocks changed. After patient used bathroom to have a bowel movement, dressing fell off. Abscess packed with 1/2 inch, dry 4x4 and covered with tape. Offered sitz bath before dressing changed. Pt declined, wanted to get back into bed. Patient requested sitz bath after lunch. 1455 patient tolerated sitz bath. Prior to bath, patient had another soft stool. Wound care performed.

## 2018-07-20 NOTE — PROGRESS NOTES
Daily Progress Note: 7/20/2018  Rod Davis MD    Assessment/Plan:   Sepsis POA due to below:Fever (7/13/2018)/  Leukocytosis (7/14/2018)/  SIRS (systemic inflammatory response syndrome) ([facility] (7/14/2018): in the setting of immunosuppression.   --broad spectrum abx - pip-tazo       Rhona-rectal abscess (7/14/2018): left side. Likely source of fever. IV antibiotics as above. S/p I+D on 7/14; wound cultures pending  --per surgery  --pelvic CT did not show pelvic abscess  --continue catheter     Myelofibrosis ([facility] (7/14/2018)/ Anemia/Thrombocytopenia ([facility] (7/14/2018): follows up at [facility]. Hgb up to 8s after 2 units of PRBCs 7/14  --heme following     Hypokalemia (7/14/2018): likely from vomiting. Replete and follow K+     Nausea and vomiting (7/14/2018): likely from infection as above. Hydrate. IV anti-emetics. Monitor    Constipation  --Stool softeners daily  --Stop Miralax as this has not been effective in the past     Code Status:  Full     Problem List:  Problem List as of 7/20/2018  Date Reviewed: 7/14/2018          Codes Class Noted - Resolved    Myelofibrosis ([facility] ICD-10-CM: D75.81  ICD-9-CM: 289.83  7/14/2018 - Present        Thrombocytopenia ([facility] ICD-10-CM: D69.6  ICD-9-CM: 287.5  7/14/2018 - Present        Hypokalemia ICD-10-CM: E87.6  ICD-9-CM: 276.8  7/14/2018 - Present        Leukocytosis ICD-10-CM: D39.711  ICD-9-CM: 288.60  7/14/2018 - Present        Nausea and vomiting ICD-10-CM: R11.2  ICD-9-CM: 787.01  7/14/2018 - Present        SIRS (systemic inflammatory response syndrome) ([facility] ICD-10-CM: R65.10  ICD-9-CM: 995.90  7/14/2018 - Present        Rhona-rectal abscess ICD-10-CM: K61.1  ICD-9-CM: 421  7/14/2018 - Present        * (Principal)Fever ICD-10-CM: R50.9  ICD-9-CM: 780.60  7/13/2018 - Present              Subjective:   Ms. Robin Hardy is a 75 y.o. female who is being admitted for Fever. Ms. Robin Hardy presented to our Emergency Department today complaining of intermittent fever and chills associated with generalized weakness. She has also been having nausea and vomiting but denies any headaches, flu like or respiratory symptoms, No abdominal discomfort or diarrhea. She has not has any urinary symptoms. No overt focal symptoms. She does have a Hx of myelofibrosis and follows up at [facility]. Work up thus far has been unremarkable. In light of her immunosuppression, she will be admitted for further management. [Dr Norton Hollow     7/14: Pt found to have perirectal abscess. Afebrile since 5AM.  Still nauseated. NPO for I+D in OR today with Dr Joyce Mcarthur. 7/15: continues to be nauseated. Labs pending this AM.  Surgeon doesn't feel it is much better despite I+D yesterday. Pt says pain is better controlled with percocet. 7/16/18: K+ 2.6. Repleting with IV. (6 total) WBC a little better. Tmax 98.8.     7/17:  Pt reports she is feeling somewhat better. Dr Kylie Bey from ([address] [place] [address]) called last night (see note from last night) and wanted Mario 2 inhibitor restarted at 1/2 dose and weaned off from there. WBC remains 15K. K+ back down - replacing. Tmax 101.9. Tnow 98.6. Diuresed about a liter overnight. Checking Mag also. 7/18:  Reports \"starting to feel better. \"  Little pain from surg site. Tmax 99. K+ 3.1 - cont to replace. WBC remains at 15K. LFTs sl up - cont to monitor. 7/19/18: Less pain. Finally had a BM and feels better. WBC still up. Tmax 99.7    7/20:  She reports she feels better but remains very weak. Cont to need IV antibiotics. WBC up to 20K. Tmax 99. K+ normalized. LFTs better. Review of Systems:   A comprehensive review of systems was negative except for that written in the HPI. Objective:   Physical Exam:     Visit Vitals    /71 (BP 1 Location: Right arm, BP Patient Position: At rest;Post activity;Supine; Head of bed elevated (Comment degrees))    Pulse 77    Temp 98.4 °F (36.9 °C)    Resp 16    Ht 5' 6\" (1.676 m)    Wt 71.3 kg (157 lb 1.6 oz)    SpO2 95%    BMI 25.36 kg/m2    O2 Flow Rate (L/min): 2 l/min O2 Device: Room air    Temp (24hrs), Av.5 °F (36.9 °C), Min:97.8 °F (36.6 °C), Max:99.3 °F (37.4 °C)         1901 -  0700  In: 800 [P.O.:450; I.V.:350]  Out: 5500 [Urine:5500]    General:  Alert, cooperative, no distress, appears stated age   Head:  Normocephalic, without obvious abnormality, atraumatic. Eyes:  Conjunctivae/corneas clear. PERRL, EOMs intact. Nose: Nares normal. Septum midline. Throat: Lips, mucosa, and tongue dry   Neck: Supple, symmetrical, trachea midline, no adenopathy, thyroid: no enlargement/tenderness/nodules, no carotid bruit and no JVD. Back:   Symmetric, no curvature. ROM normal. No CVA tenderness. Lungs:   A few basilar crackles bilaterally. Chest wall:  No tenderness or deformity. Heart:  Regular rate and rhythm, S1, S2 normal, 1/9 ASHA, click, rub or gallop. Abdomen:   Soft, non-tender. Bowel sounds normal. No masses,  No organomegaly. : corcoran in place, L>R labia indurated and red, L glut and perirectal area indurated   Extremities: Extremities normal, atraumatic, no cyanosis or edema. No calf tenderness or cords. Pulses: 2+ and symmetric all extremities. Skin: Skin color, texture, turgor normal. No rashes or lesions   Neurologic: CNII-XII intact. Alert and oriented X 3. Fine motor of hands and fingers normal.   equal.  Gait not tested at this time. Sensation grossly normal to touch.   Gross motor of extremities normal.       Data Review:       Recent Days:  Recent Labs      18   0402  18   0125  18   0154   WBC  20.5*  16.3*  15.3*   HGB  9.6*  9.3*  9.4*   HCT  30.5*  29.8*  29.1*   PLT  54*  40*  41*     Recent Labs      18   0402  18   0125  18   0154   NA  137  137  140   K  4.0  4.2  3.1*   CL  105  105  102   CO2  25  28  32   GLU  99  117*  106*   BUN  13  15  20   CREA  0.85  0.88  1.02   CA  8.2*  8.0*  8.1*   MG 1.8  1.9  1.9   ALB  2.5*  2.3*  2.3*   SGOT  61*  84*  97*   ALT  60  64  59     No results for input(s): PH, PCO2, PO2, HCO3, FIO2 in the last 72 hours. 24 Hour Results:  Recent Results (from the past 24 hour(s))   CBC WITH AUTOMATED DIFF    Collection Time: 07/20/18  4:02 AM   Result Value Ref Range    WBC 20.5 (H) 3.6 - 11.0 K/uL    RBC 3.22 (L) 3.80 - 5.20 M/uL    HGB 9.6 (L) 11.5 - 16.0 g/dL    HCT 30.5 (L) 35.0 - 47.0 %    MCV 94.7 80.0 - 99.0 FL    MCH 29.8 26.0 - 34.0 PG    MCHC 31.5 30.0 - 36.5 g/dL    RDW 19.9 (H) 11.5 - 14.5 %    PLATELET 54 (L) 742 - 400 K/uL    NRBC 2.0 (H) 0  WBC    ABSOLUTE NRBC 0.40 (H) 0.00 - 0.01 K/uL    NEUTROPHILS 57 32 - 75 %    BAND NEUTROPHILS 9 (H) 0 - 6 %    LYMPHOCYTES 14 12 - 49 %    MONOCYTES 5 5 - 13 %    EOSINOPHILS 0 0 - 7 %    BASOPHILS 1 0 - 1 %    METAMYELOCYTES 7 (H) 0 %    MYELOCYTES 7 (H) 0 %    IMMATURE GRANULOCYTES 0 %    ABS. NEUTROPHILS 13.5 (H) 1.8 - 8.0 K/UL    ABS. LYMPHOCYTES 2.9 0.8 - 3.5 K/UL    ABS. MONOCYTES 1.0 0.0 - 1.0 K/UL    ABS. EOSINOPHILS 0.0 0.0 - 0.4 K/UL    ABS. BASOPHILS 0.2 (H) 0.0 - 0.1 K/UL    ABS. IMM. GRANS. 0.0 K/UL    DF MANUAL      RBC COMMENTS STOMATOCYTES  2+        RBC COMMENTS ANISOCYTOSIS  1+       METABOLIC PANEL, COMPREHENSIVE    Collection Time: 07/20/18  4:02 AM   Result Value Ref Range    Sodium 137 136 - 145 mmol/L    Potassium 4.0 3.5 - 5.1 mmol/L    Chloride 105 97 - 108 mmol/L    CO2 25 21 - 32 mmol/L    Anion gap 7 5 - 15 mmol/L    Glucose 99 65 - 100 mg/dL    BUN 13 6 - 20 MG/DL    Creatinine 0.85 0.55 - 1.02 MG/DL    BUN/Creatinine ratio 15 12 - 20      GFR est AA >60 >60 ml/min/1.73m2    GFR est non-AA >60 >60 ml/min/1.73m2    Calcium 8.2 (L) 8.5 - 10.1 MG/DL    Bilirubin, total 0.9 0.2 - 1.0 MG/DL    ALT (SGPT) 60 12 - 78 U/L    AST (SGOT) 61 (H) 15 - 37 U/L    Alk.  phosphatase 159 (H) 45 - 117 U/L    Protein, total 6.5 6.4 - 8.2 g/dL    Albumin 2.5 (L) 3.5 - 5.0 g/dL    Globulin 4.0 2.0 - 4.0 g/dL A-G Ratio 0.6 (L) 1.1 - 2.2     MAGNESIUM    Collection Time: 07/20/18  4:02 AM   Result Value Ref Range    Magnesium 1.8 1.6 - 2.4 mg/dL       Medications reviewed  Current Facility-Administered Medications   Medication Dose Route Frequency    senna-docusate (PERICOLACE) 8.6-50 mg per tablet 1 Tab  1 Tab Oral BID    senna (SENOKOT) tablet 43 mg  5 Tab Oral QHS    potassium chloride (KLOR-CON) packet 20 mEq  20 mEq Oral TID WITH MEALS    vancomycin (VANCOCIN) 1,000 mg in 0.9% sodium chloride (MBP/ADV) 250 mL  1,000 mg IntraVENous Q12H    piperacillin-tazobactam (ZOSYN) 3.375 g in 0.9% sodium chloride (MBP/ADV) 100 mL  3.375 g IntraVENous Q8H    ruxolitinib tab 15 mg (Patient Supplied)  15 mg Oral DAILY    aluminum-magnesium hydroxide (MAALOX) oral suspension 30 mL  30 mL Oral QID PRN    promethazine (PHENERGAN) 25 mg in NS IVPB  25 mg IntraVENous Q8H PRN    pantoprazole (PROTONIX) tablet 40 mg  40 mg Oral ACB    oxyCODONE-acetaminophen (PERCOCET) 5-325 mg per tablet 1 Tab  1 Tab Oral Q4H PRN    HYDROmorphone (DILAUDID) tablet 1 mg  1 mg Oral Q4H PRN    sodium chloride (NS) flush 5-10 mL  5-10 mL IntraVENous PRN    sodium chloride (NS) flush 5-10 mL  5-10 mL IntraVENous Q8H    sodium chloride (NS) flush 5-10 mL  5-10 mL IntraVENous PRN    acetaminophen (TYLENOL) tablet 650 mg  650 mg Oral Q4H PRN    ondansetron (ZOFRAN) injection 4 mg  4 mg IntraVENous Q4H PRN    0.9% sodium chloride infusion 250 mL  250 mL IntraVENous PRN     Care Plan discussed with: Patient and Nurse  Total time spent with patient: 30 minutes.     Asha Medrano MD

## 2018-07-20 NOTE — PROGRESS NOTES
PHYSICAL THERAPY NOTE:   Treatment attempted with pt declining due to need for a dressing change following a BM. Pt reports ambulating to the bathroom twice today. Pt apologetic and reports understanding  the importance of therapy. Will continue to follow and attempt treatment later today as time allows. Thank you.    [facility]

## 2018-07-21 ENCOUNTER — APPOINTMENT (OUTPATIENT)
Dept: CT IMAGING | Age: 73
DRG: 854 | End: 2018-07-21
Attending: FAMILY MEDICINE
Payer: MEDICARE

## 2018-07-21 LAB
BASOPHILS # BLD: 0 K/UL (ref 0–0.1)
BASOPHILS NFR BLD: 0 % (ref 0–1)
DIFFERENTIAL METHOD BLD: ABNORMAL
EOSINOPHIL # BLD: 0 K/UL (ref 0–0.4)
EOSINOPHIL NFR BLD: 0 % (ref 0–7)
ERYTHROCYTE [DISTWIDTH] IN BLOOD BY AUTOMATED COUNT: 19.4 % (ref 11.5–14.5)
HCT VFR BLD AUTO: 29.1 % (ref 35–47)
HGB BLD-MCNC: 9.1 G/DL (ref 11.5–16)
IMM GRANULOCYTES # BLD: 0 K/UL
IMM GRANULOCYTES NFR BLD AUTO: 0 %
LYMPHOCYTES # BLD: 4.7 K/UL (ref 0.8–3.5)
LYMPHOCYTES NFR BLD: 20 % (ref 12–49)
MAGNESIUM SERPL-MCNC: 1.9 MG/DL (ref 1.6–2.4)
MCH RBC QN AUTO: 29.7 PG (ref 26–34)
MCHC RBC AUTO-ENTMCNC: 31.3 G/DL (ref 30–36.5)
MCV RBC AUTO: 95.1 FL (ref 80–99)
METAMYELOCYTES NFR BLD MANUAL: 4 %
MONOCYTES # BLD: 4.4 K/UL (ref 0–1)
MONOCYTES NFR BLD: 19 % (ref 5–13)
MYELOCYTES NFR BLD MANUAL: 1 %
NEUTS BAND NFR BLD MANUAL: 6 % (ref 0–6)
NEUTS SEG # BLD: 12.2 K/UL (ref 1.8–8)
NEUTS SEG NFR BLD: 46 % (ref 32–75)
NRBC # BLD: 0.63 K/UL (ref 0–0.01)
NRBC BLD-RTO: 2.7 PER 100 WBC
PLATELET # BLD AUTO: 71 K/UL (ref 150–400)
PROMYELOCYTES NFR BLD MANUAL: 4 %
RBC # BLD AUTO: 3.06 M/UL (ref 3.8–5.2)
RBC MORPH BLD: ABNORMAL
RBC MORPH BLD: ABNORMAL
WBC # BLD AUTO: 23.4 K/UL (ref 3.6–11)

## 2018-07-21 PROCEDURE — 85025 COMPLETE CBC W/AUTO DIFF WBC: CPT | Performed by: FAMILY MEDICINE

## 2018-07-21 PROCEDURE — 72193 CT PELVIS W/DYE: CPT

## 2018-07-21 PROCEDURE — 74011000258 HC RX REV CODE- 258: Performed by: INTERNAL MEDICINE

## 2018-07-21 PROCEDURE — 74011250636 HC RX REV CODE- 250/636: Performed by: INTERNAL MEDICINE

## 2018-07-21 PROCEDURE — 74011636320 HC RX REV CODE- 636/320: Performed by: RADIOLOGY

## 2018-07-21 PROCEDURE — 65660000000 HC RM CCU STEPDOWN

## 2018-07-21 PROCEDURE — 36415 COLL VENOUS BLD VENIPUNCTURE: CPT | Performed by: FAMILY MEDICINE

## 2018-07-21 PROCEDURE — 83735 ASSAY OF MAGNESIUM: CPT | Performed by: FAMILY MEDICINE

## 2018-07-21 PROCEDURE — 74011250637 HC RX REV CODE- 250/637: Performed by: FAMILY MEDICINE

## 2018-07-21 PROCEDURE — 94760 N-INVAS EAR/PLS OXIMETRY 1: CPT

## 2018-07-21 PROCEDURE — 74011250637 HC RX REV CODE- 250/637: Performed by: INTERNAL MEDICINE

## 2018-07-21 RX ORDER — POTASSIUM CHLORIDE 1.5 G/1.77G
20 POWDER, FOR SOLUTION ORAL DAILY
Status: DISCONTINUED | OUTPATIENT
Start: 2018-07-22 | End: 2018-07-24

## 2018-07-21 RX ADMIN — ACETAMINOPHEN 650 MG: 325 TABLET ORAL at 20:31

## 2018-07-21 RX ADMIN — VANCOMYCIN HYDROCHLORIDE 1000 MG: 1 INJECTION, POWDER, LYOPHILIZED, FOR SOLUTION INTRAVENOUS at 04:00

## 2018-07-21 RX ADMIN — PIPERACILLIN SODIUM,TAZOBACTAM SODIUM 3.38 G: 3; .375 INJECTION, POWDER, FOR SOLUTION INTRAVENOUS at 22:09

## 2018-07-21 RX ADMIN — PIPERACILLIN SODIUM,TAZOBACTAM SODIUM 3.38 G: 3; .375 INJECTION, POWDER, FOR SOLUTION INTRAVENOUS at 06:24

## 2018-07-21 RX ADMIN — IOPAMIDOL 100 ML: 612 INJECTION, SOLUTION INTRAVENOUS at 11:00

## 2018-07-21 RX ADMIN — PIPERACILLIN SODIUM,TAZOBACTAM SODIUM 3.38 G: 3; .375 INJECTION, POWDER, FOR SOLUTION INTRAVENOUS at 15:09

## 2018-07-21 RX ADMIN — PANTOPRAZOLE SODIUM 40 MG: 40 TABLET, DELAYED RELEASE ORAL at 11:20

## 2018-07-21 RX ADMIN — POTASSIUM CHLORIDE 20 MEQ: 1.5 POWDER, FOR SOLUTION ORAL at 11:20

## 2018-07-21 RX ADMIN — VANCOMYCIN HYDROCHLORIDE 1000 MG: 1 INJECTION, POWDER, LYOPHILIZED, FOR SOLUTION INTRAVENOUS at 16:15

## 2018-07-21 NOTE — PROGRESS NOTES
Daily Progress Note: 7/21/2018  Imani Enamorado MD    Assessment/Plan:   Sepsis POA due to below:Fever (7/13/2018)/  Leukocytosis (7/14/2018)/  SIRS (systemic inflammatory response syndrome) ([facility] (7/14/2018): in the setting of immunosuppression.   --broad spectrum abx - pip-tazo       Rhona-rectal abscess (7/14/2018): left side. Likely source of fever. IV antibiotics as above. S/p I+D on 7/14; wound cultures pending  --per surgery  --pelvic CT did not show pelvic abscess  --continue catheter     Myelofibrosis ([facility] (7/14/2018)/ Anemia/Thrombocytopenia ([facility] (7/14/2018): follows up at [facility]. Hgb up to 8s after 2 units of PRBCs 7/14  --heme following     Hypokalemia (7/14/2018): likely from vomiting. Replete and follow K+     Nausea and vomiting (7/14/2018): likely from infection as above. Hydrate. IV anti-emetics. Monitor    Constipation  --Stool softeners daily  --Stop Miralax as this has not been effective in the past     Code Status:  Full     Problem List:  Problem List as of 7/21/2018  Date Reviewed: 7/14/2018          Codes Class Noted - Resolved    Myelofibrosis ([facility] ICD-10-CM: D75.81  ICD-9-CM: 289.83  7/14/2018 - Present        Thrombocytopenia ([facility] ICD-10-CM: D69.6  ICD-9-CM: 287.5  7/14/2018 - Present        Hypokalemia ICD-10-CM: E87.6  ICD-9-CM: 276.8  7/14/2018 - Present        Leukocytosis ICD-10-CM: Q51.248  ICD-9-CM: 288.60  7/14/2018 - Present        Nausea and vomiting ICD-10-CM: R11.2  ICD-9-CM: 787.01  7/14/2018 - Present        SIRS (systemic inflammatory response syndrome) ([facility] ICD-10-CM: R65.10  ICD-9-CM: 995.90  7/14/2018 - Present        Rhona-rectal abscess ICD-10-CM: K61.1  ICD-9-CM: 402  7/14/2018 - Present        * (Principal)Fever ICD-10-CM: R50.9  ICD-9-CM: 780.60  7/13/2018 - Present              Subjective:   Ms. Aurelio Moore is a 75 y.o. female who is being admitted for Fever. Ms. Aurelio Moore presented to our Emergency Department today complaining of intermittent fever and chills associated with generalized weakness. She has also been having nausea and vomiting but denies any headaches, flu like or respiratory symptoms, No abdominal discomfort or diarrhea. She has not has any urinary symptoms. No overt focal symptoms. She does have a Hx of myelofibrosis and follows up at [facility]. Work up thus far has been unremarkable. In light of her immunosuppression, she will be admitted for further management. [Dr Tram Burgos     7/14: Pt found to have perirectal abscess. Afebrile since 5AM.  Still nauseated. NPO for I+D in OR today with Dr Obdulia Srinivasan. 7/15: continues to be nauseated. Labs pending this AM.  Surgeon doesn't feel it is much better despite I+D yesterday. Pt says pain is better controlled with percocet. 7/16/18: K+ 2.6. Repleting with IV. (6 total) WBC a little better. Tmax 98.8.     7/17:  Pt reports she is feeling somewhat better. Dr Lauren Schmitt from ([address] [place] [address]) called last night (see note from last night) and wanted Mario 2 inhibitor restarted at 1/2 dose and weaned off from there. WBC remains 15K. K+ back down - replacing. Tmax 101.9. Tnow 98.6. Diuresed about a liter overnight. Checking Mag also. 7/18:  Reports \"starting to feel better. \"  Little pain from surg site. Tmax 99. K+ 3.1 - cont to replace. WBC remains at 15K. LFTs sl up - cont to monitor. 7/19/18: Less pain. Finally had a BM and feels better. WBC still up. Tmax 99.7    7/20:  She reports she feels better but remains very weak. Cont to need IV antibiotics. WBC up to 20K. Tmax 99. K+ normalized. LFTs better. 7/21:  Tmax 99. WBC up again. Will repeat CT of the pelvis as her WBC is higher. She is having less pain. Review of Systems:   A comprehensive review of systems was negative except for that written in the HPI.     Objective:   Physical Exam:     Visit Vitals    /70 (BP 1 Location: Left arm, BP Patient Position: At rest)    Pulse 82    Temp 98.2 °F (36.8 °C)    Resp 13    Ht 5' 6\" (1.676 m)    Wt 71.3 kg (157 lb 1.6 oz)    SpO2 94%    BMI 25.36 kg/m2    O2 Flow Rate (L/min): 2 l/min O2 Device: Room air    Temp (24hrs), Av.4 °F (36.9 °C), Min:98 °F (36.7 °C), Max:99.2 °F (37.3 °C)         190 -  0700  In: 790 [P.O.:340; I.V.:450]  Out: 4275 [Urine:4275]    General:  Alert, cooperative, no distress, appears stated age   Head:  Normocephalic, without obvious abnormality, atraumatic. Eyes:  Conjunctivae/corneas clear. PERRL, EOMs intact. Nose: Nares normal. Septum midline. Throat: Lips, mucosa, and tongue dry   Neck: Supple, symmetrical, trachea midline, no adenopathy, thyroid: no enlargement/tenderness/nodules, no carotid bruit and no JVD. Back:   Symmetric, no curvature. ROM normal. No CVA tenderness. Lungs:   A few basilar crackles bilaterally. Chest wall:  No tenderness or deformity. Heart:  Regular rate and rhythm, S1, S2 normal, 1/9 ASHA, click, rub or gallop. Abdomen:   Soft, non-tender. Bowel sounds normal. No masses,  No organomegaly. : corcoran in place, L>R labia indurated and red, L glut and perirectal area indurated   Extremities: Extremities normal, atraumatic, no cyanosis or edema. No calf tenderness or cords. Pulses: 2+ and symmetric all extremities. Skin: Skin color, texture, turgor normal. No rashes or lesions   Neurologic: CNII-XII intact. Alert and oriented X 3. Fine motor of hands and fingers normal.   equal.  Gait not tested at this time. Sensation grossly normal to touch.   Gross motor of extremities normal.       Data Review:       Recent Days:  Recent Labs      18   0625  18   0402  18   0125   WBC  23.4*  20.5*  16.3*   HGB  9.1*  9.6*  9.3*   HCT  29.1*  30.5*  29.8*   PLT  71*  54*  40*     Recent Labs      18   0625  18   0402  18   0125   NA   --   137  137   K   --   4.0  4.2   CL   --   105  105   CO2   --   25  28   GLU   --   99  117*   BUN   --   13 15   CREA   --   0.85  0.88   CA   --   8.2*  8.0*   MG  1.9  1.8  1.9   ALB   --   2.5*  2.3*   SGOT   --   61*  84*   ALT   --   60  64     No results for input(s): PH, PCO2, PO2, HCO3, FIO2 in the last 72 hours. 24 Hour Results:  Recent Results (from the past 24 hour(s))   MAGNESIUM    Collection Time: 07/21/18  6:25 AM   Result Value Ref Range    Magnesium 1.9 1.6 - 2.4 mg/dL   CBC WITH AUTOMATED DIFF    Collection Time: 07/21/18  6:25 AM   Result Value Ref Range    WBC 23.4 (H) 3.6 - 11.0 K/uL    RBC 3.06 (L) 3.80 - 5.20 M/uL    HGB 9.1 (L) 11.5 - 16.0 g/dL    HCT 29.1 (L) 35.0 - 47.0 %    MCV 95.1 80.0 - 99.0 FL    MCH 29.7 26.0 - 34.0 PG    MCHC 31.3 30.0 - 36.5 g/dL    RDW 19.4 (H) 11.5 - 14.5 %    PLATELET 71 (L) 409 - 400 K/uL    NRBC 2.7 (H) 0  WBC    ABSOLUTE NRBC 0.63 (H) 0.00 - 0.01 K/uL    NEUTROPHILS 46 32 - 75 %    BAND NEUTROPHILS 6 0 - 6 %    LYMPHOCYTES 20 12 - 49 %    MONOCYTES 19 (H) 5 - 13 %    EOSINOPHILS 0 0 - 7 %    BASOPHILS 0 0 - 1 %    METAMYELOCYTES 4 (H) 0 %    MYELOCYTES 1 (H) 0 %    PROMYELOCYTES 4 (H) 0 %    IMMATURE GRANULOCYTES 0 %    ABS. NEUTROPHILS 12.2 (H) 1.8 - 8.0 K/UL    ABS. LYMPHOCYTES 4.7 (H) 0.8 - 3.5 K/UL    ABS. MONOCYTES 4.4 (H) 0.0 - 1.0 K/UL    ABS. EOSINOPHILS 0.0 0.0 - 0.4 K/UL    ABS. BASOPHILS 0.0 0.0 - 0.1 K/UL    ABS. IMM.  GRANS. 0.0 K/UL    DF MANUAL      RBC COMMENTS ANISOCYTOSIS  1+        RBC COMMENTS STOMATOCYTES  2+           Medications reviewed  Current Facility-Administered Medications   Medication Dose Route Frequency    senna-docusate (PERICOLACE) 8.6-50 mg per tablet 1 Tab  1 Tab Oral BID    senna (SENOKOT) tablet 43 mg  5 Tab Oral QHS    potassium chloride (KLOR-CON) packet 20 mEq  20 mEq Oral TID WITH MEALS    vancomycin (VANCOCIN) 1,000 mg in 0.9% sodium chloride (MBP/ADV) 250 mL  1,000 mg IntraVENous Q12H    piperacillin-tazobactam (ZOSYN) 3.375 g in 0.9% sodium chloride (MBP/ADV) 100 mL  3.375 g IntraVENous Q8H    ruxolitinib tab 15 mg (Patient Supplied)  15 mg Oral DAILY    aluminum-magnesium hydroxide (MAALOX) oral suspension 30 mL  30 mL Oral QID PRN    promethazine (PHENERGAN) 25 mg in NS IVPB  25 mg IntraVENous Q8H PRN    pantoprazole (PROTONIX) tablet 40 mg  40 mg Oral ACB    oxyCODONE-acetaminophen (PERCOCET) 5-325 mg per tablet 1 Tab  1 Tab Oral Q4H PRN    HYDROmorphone (DILAUDID) tablet 1 mg  1 mg Oral Q4H PRN    sodium chloride (NS) flush 5-10 mL  5-10 mL IntraVENous PRN    sodium chloride (NS) flush 5-10 mL  5-10 mL IntraVENous Q8H    sodium chloride (NS) flush 5-10 mL  5-10 mL IntraVENous PRN    acetaminophen (TYLENOL) tablet 650 mg  650 mg Oral Q4H PRN    ondansetron (ZOFRAN) injection 4 mg  4 mg IntraVENous Q4H PRN    0.9% sodium chloride infusion 250 mL  250 mL IntraVENous PRN     Care Plan discussed with: Patient and Nurse  Total time spent with patient: 30 minutes.     Nahomi Mcdonough MD

## 2018-07-21 NOTE — PROGRESS NOTES
PHYSICAL THERAPY NOTE:   Spoke with RN prior to attempt at treatment with RN reporting that pt is about to go down for a CT scan. Will continue to follow and attempt treatment later today as time allows. Thank you.  Nilsa PICKARD

## 2018-07-21 NOTE — PROGRESS NOTES
0830: Dr. Arik Szymanski aware of patient having multiple bowel movements. No new order for testing at this time. 1115: Dr. Arik Szymanski confirmed that patient can have potassium packets daily rather than TID. Order adjusted in [facility] care. 1700: Patient refused sitz baths today. Bedside and Verbal shift change report given to [place] RN (oncoming nurse) by Waldo Phillip RN (offgoing nurse). Report included the following information SBAR, Kardex, MAR and Recent Results.

## 2018-07-21 NOTE — PROGRESS NOTES
Bedside and Verbal shift change report given to Ayesha (oncoming nurse) by Carmen Witt (offgoing nurse). Report included the following information SBAR, Kardex, ED Summary, Procedure Summary, Intake/Output, MAR, Accordion, Recent Results, Med Rec Status and Cardiac Rhythm SR.     2000- Pt lying in bed appears comfortable. A&O x4- MOLINA equal- mild generalized weakness. Pt up to bedside commode- Lg brown loose bm noted- Rhona-care complete/cleansed- new packing/dressing place- tolerated well. VSS. Call bell with in reach- will continue to monitor. 0000- Reassessment unchanged. No  Complaints of any pain. No needs at this time. Pt turns self. Call bell in reach. VSS. Will continue to monitor. 0400- Reassessment unchanged. No complaints of any pain. Pt turns self. Call bell in reach. VSS. Will continue to monitor. 0630- Meds given, Labs drawn and sent. Bedside and Verbal shift change report given to John (oncoming nurse) by Anton Rubi (offgoing nurse). Report included the following information SBAR, Kardex, ED Summary, Procedure Summary, Intake/Output, MAR, Accordion, Recent Results, Med Rec Status and Cardiac Rhythm NSR.

## 2018-07-22 LAB
BASOPHILS # BLD: 0.2 K/UL (ref 0–0.1)
BASOPHILS NFR BLD: 1 % (ref 0–1)
DATE LAST DOSE: ABNORMAL
DIFFERENTIAL METHOD BLD: ABNORMAL
EOSINOPHIL # BLD: 0 K/UL (ref 0–0.4)
EOSINOPHIL NFR BLD: 0 % (ref 0–7)
ERYTHROCYTE [DISTWIDTH] IN BLOOD BY AUTOMATED COUNT: 18.9 % (ref 11.5–14.5)
HCT VFR BLD AUTO: 28.7 % (ref 35–47)
HGB BLD-MCNC: 8.9 G/DL (ref 11.5–16)
IMM GRANULOCYTES # BLD: 0 K/UL
IMM GRANULOCYTES NFR BLD AUTO: 0 %
LYMPHOCYTES # BLD: 2 K/UL (ref 0.8–3.5)
LYMPHOCYTES NFR BLD: 9 % (ref 12–49)
MAGNESIUM SERPL-MCNC: 2 MG/DL (ref 1.6–2.4)
MCH RBC QN AUTO: 29.6 PG (ref 26–34)
MCHC RBC AUTO-ENTMCNC: 31 G/DL (ref 30–36.5)
MCV RBC AUTO: 95.3 FL (ref 80–99)
METAMYELOCYTES NFR BLD MANUAL: 12 %
MONOCYTES # BLD: 4.7 K/UL (ref 0–1)
MONOCYTES NFR BLD: 21 % (ref 5–13)
MYELOCYTES NFR BLD MANUAL: 4 %
NEUTS BAND NFR BLD MANUAL: 9 % (ref 0–6)
NEUTS SEG # BLD: 11.5 K/UL (ref 1.8–8)
NEUTS SEG NFR BLD: 42 % (ref 32–75)
NRBC # BLD: 1.01 K/UL (ref 0–0.01)
NRBC BLD-RTO: 4.5 PER 100 WBC
PLATELET # BLD AUTO: 91 K/UL (ref 150–400)
PROMYELOCYTES NFR BLD MANUAL: 2 %
RBC # BLD AUTO: 3.01 M/UL (ref 3.8–5.2)
RBC MORPH BLD: ABNORMAL
RBC MORPH BLD: ABNORMAL
REPORTED DOSE,DOSE: ABNORMAL UNITS
REPORTED DOSE/TIME,TMG: 1600
VANCOMYCIN TROUGH SERPL-MCNC: 15.5 UG/ML (ref 5–10)
WBC # BLD AUTO: 22.5 K/UL (ref 3.6–11)

## 2018-07-22 PROCEDURE — 74011250636 HC RX REV CODE- 250/636: Performed by: INTERNAL MEDICINE

## 2018-07-22 PROCEDURE — 74011000258 HC RX REV CODE- 258: Performed by: INTERNAL MEDICINE

## 2018-07-22 PROCEDURE — 83735 ASSAY OF MAGNESIUM: CPT | Performed by: FAMILY MEDICINE

## 2018-07-22 PROCEDURE — 74011250636 HC RX REV CODE- 250/636: Performed by: FAMILY MEDICINE

## 2018-07-22 PROCEDURE — 74011250637 HC RX REV CODE- 250/637: Performed by: INTERNAL MEDICINE

## 2018-07-22 PROCEDURE — 94760 N-INVAS EAR/PLS OXIMETRY 1: CPT

## 2018-07-22 PROCEDURE — 85025 COMPLETE CBC W/AUTO DIFF WBC: CPT | Performed by: FAMILY MEDICINE

## 2018-07-22 PROCEDURE — 65660000000 HC RM CCU STEPDOWN

## 2018-07-22 PROCEDURE — 80202 ASSAY OF VANCOMYCIN: CPT | Performed by: FAMILY MEDICINE

## 2018-07-22 PROCEDURE — 36415 COLL VENOUS BLD VENIPUNCTURE: CPT | Performed by: FAMILY MEDICINE

## 2018-07-22 RX ADMIN — PIPERACILLIN SODIUM,TAZOBACTAM SODIUM 3.38 G: 3; .375 INJECTION, POWDER, FOR SOLUTION INTRAVENOUS at 15:10

## 2018-07-22 RX ADMIN — Medication 10 ML: at 23:10

## 2018-07-22 RX ADMIN — PANTOPRAZOLE SODIUM 40 MG: 40 TABLET, DELAYED RELEASE ORAL at 08:00

## 2018-07-22 RX ADMIN — VANCOMYCIN HYDROCHLORIDE 1000 MG: 1 INJECTION, POWDER, LYOPHILIZED, FOR SOLUTION INTRAVENOUS at 03:02

## 2018-07-22 RX ADMIN — PIPERACILLIN SODIUM,TAZOBACTAM SODIUM 3.38 G: 3; .375 INJECTION, POWDER, FOR SOLUTION INTRAVENOUS at 23:10

## 2018-07-22 RX ADMIN — ACETAMINOPHEN 650 MG: 325 TABLET ORAL at 20:24

## 2018-07-22 RX ADMIN — Medication 10 ML: at 14:00

## 2018-07-22 RX ADMIN — PIPERACILLIN SODIUM,TAZOBACTAM SODIUM 3.38 G: 3; .375 INJECTION, POWDER, FOR SOLUTION INTRAVENOUS at 06:31

## 2018-07-22 RX ADMIN — VANCOMYCIN HYDROCHLORIDE 1250 MG: 10 INJECTION, POWDER, LYOPHILIZED, FOR SOLUTION INTRAVENOUS at 15:55

## 2018-07-22 NOTE — PROGRESS NOTES
Spiritual Care Assessment/Progress Note  [address]      NAME: Fawad Koroma      MRN: [id number]  AGE: 75 y.o. SEX: female  Alevism Affiliation: No Latter day   Language: English     7/22/2018     Total Time (in minutes): 5     Spiritual Assessment begun in SF 3 PROG CARE TELE 2 through conversation with:         []Patient        [] Family    [] Friend(s)        Reason for Consult: Initial/Spiritual assessment, patient floor     Spiritual beliefs: (Please include comment if needed)     [] Identifies with a devon tradition:         [] Supported by a devon community:            [] Claims no spiritual orientation:           [] Seeking spiritual identity:                [] Adheres to an individual form of spirituality:           [x] Not able to assess:                           Identified resources for coping:      [] Prayer                               [] Music                  [] Guided Imagery     [] Family/friends                 [] Pet visits     [] Devotional reading                         [] Unknown     [] Other:                                              Interventions offered during this visit: (See comments for more details)                Plan of Care:     [x] Support spiritual and/or cultural needs    [] Support AMD and/or advance care planning process      [] Support grieving process   [] Coordinate Rites and/or Rituals    [] Coordination with community clergy   [] No spiritual needs identified at this time   [] Detailed Plan of Care below (See Comments)  [] Make referral to Music Therapy  [] Make referral to Pet Therapy     [] Make referral to Addiction services  [] Make referral to [facility]  [] Make referral to Spiritual Care Partner  [] No future visits requested        [] Follow up visits as needed     Comments:  attempted spiritual assessment for pt on [facility] due to length of stay. Pt was working with staff. No fam at this time.   will attempt again at a later time. Please notify us if any needs rise. Mariam Banks MJuliaS.   Spiritual Care Department  If needs rise please call Yolanda-HUMBLE ([phone])

## 2018-07-22 NOTE — PROGRESS NOTES
Daily Progress Note: 7/22/2018  Sarahi Dong MD    Assessment/Plan:   Sepsis POA due to below:Fever (7/13/2018)/  Leukocytosis (7/14/2018)/  SIRS (systemic inflammatory response syndrome) ([facility] (7/14/2018): in the setting of immunosuppression.   --broad spectrum abx - pip-tazo       Rhona-rectal abscess (7/14/2018): left side. Likely source of fever. IV antibiotics as above. S/p I+D on 7/14; wound cultures pending  --per surgery- will re consult for tomorrow  --pelvic CT did not show pelvic abscess- repeat CT neg  --continue catheter     Myelofibrosis ([facility] (7/14/2018)/ Anemia/Thrombocytopenia ([facility] (7/14/2018): follows up at [facility]. Hgb up to 8s after 2 units of PRBCs 7/14  --heme following     Hypokalemia (7/14/2018): likely from vomiting. Replete and follow K+     Nausea and vomiting (7/14/2018): likely from infection as above. Hydrate. IV anti-emetics. Monitor    Constipation  --Stool softeners daily  --Stop Miralax as this has not been effective in the past     Code Status:  Full     Problem List:  Problem List as of 7/22/2018  Date Reviewed: 7/14/2018          Codes Class Noted - Resolved    Myelofibrosis ([facility] ICD-10-CM: D75.81  ICD-9-CM: 289.83  7/14/2018 - Present        Thrombocytopenia ([facility] ICD-10-CM: D69.6  ICD-9-CM: 287.5  7/14/2018 - Present        Hypokalemia ICD-10-CM: E87.6  ICD-9-CM: 276.8  7/14/2018 - Present        Leukocytosis ICD-10-CM: G93.725  ICD-9-CM: 288.60  7/14/2018 - Present        Nausea and vomiting ICD-10-CM: R11.2  ICD-9-CM: 787.01  7/14/2018 - Present        SIRS (systemic inflammatory response syndrome) ([facility] ICD-10-CM: R65.10  ICD-9-CM: 995.90  7/14/2018 - Present        Rhona-rectal abscess ICD-10-CM: K61.1  ICD-9-CM: 149  7/14/2018 - Present        * (Principal)Fever ICD-10-CM: R50.9  ICD-9-CM: 780.60  7/13/2018 - Present              Subjective:   Ms. Solange Morfin is a 75 y.o. female who is being admitted for Fever. Ms. Solange Morfin presented to our Emergency Department today complaining of intermittent fever and chills associated with generalized weakness. She has also been having nausea and vomiting but denies any headaches, flu like or respiratory symptoms, No abdominal discomfort or diarrhea. She has not has any urinary symptoms. No overt focal symptoms. She does have a Hx of myelofibrosis and follows up at [facility]. Work up thus far has been unremarkable. In light of her immunosuppression, she will be admitted for further management. [Dr Jose Sorto     7/14: Pt found to have perirectal abscess. Afebrile since 5AM.  Still nauseated. NPO for I+D in OR today with Dr Rolando Bautista. 7/15: continues to be nauseated. Labs pending this AM.  Surgeon doesn't feel it is much better despite I+D yesterday. Pt says pain is better controlled with percocet. 7/16/18: K+ 2.6. Repleting with IV. (6 total) WBC a little better. Tmax 98.8.     7/17:  Pt reports she is feeling somewhat better. Dr Jignesh Garcia from ([address] [place] [address]) called last night (see note from last night) and wanted Mario 2 inhibitor restarted at 1/2 dose and weaned off from there. WBC remains 15K. K+ back down - replacing. Tmax 101.9. Tnow 98.6. Diuresed about a liter overnight. Checking Mag also. 7/18:  Reports \"starting to feel better. \"  Little pain from surg site. Tmax 99. K+ 3.1 - cont to replace. WBC remains at 15K. LFTs sl up - cont to monitor. 7/19/18: Less pain. Finally had a BM and feels better. WBC still up. Tmax 99.7    7/20:  She reports she feels better but remains very weak. Cont to need IV antibiotics. WBC up to 20K. Tmax 99. K+ normalized. LFTs better. 7/21:  Tmax 99. WBC up again. Will repeat CT of the pelvis as her WBC is higher. She is having less pain. 7/22/18: Feeling pretty well. WBC is still high but repeat CT was neg for abscess. Will ask surgery to see again tomorrow.      Review of Systems:   A comprehensive review of systems was negative except for that written in the HPI. Objective:   Physical Exam:     Visit Vitals    /60    Pulse 94    Temp 98.3 °F (36.8 °C)    Resp 16    Ht 5' 6\" (1.676 m)    Wt 155 lb 9.6 oz (70.6 kg)    SpO2 96%    BMI 25.11 kg/m2    O2 Flow Rate (L/min): 2 l/min O2 Device: Room air    Temp (24hrs), Av.9 °F (37.2 °C), Min:98.3 °F (36.8 °C), Max:99.1 °F (37.3 °C)    701 - 1900  In: -   Out: 750 [Urine:750]   1901 -  07  In: 2672 [P.O.:120; I.V.:1100]  Out: 3625 [Urine:3625]    General:  Alert, cooperative, no distress, appears stated age   Head:  Normocephalic, without obvious abnormality, atraumatic. Eyes:  Conjunctivae/corneas clear. PERRL, EOMs intact. Nose: Nares normal. Septum midline. Throat: Lips, mucosa, and tongue dry   Neck: Supple, symmetrical, trachea midline, no adenopathy, thyroid: no enlargement/tenderness/nodules, no carotid bruit and no JVD. Back:   Symmetric, no curvature. ROM normal. No CVA tenderness. Lungs:   A few basilar crackles bilaterally. Chest wall:  No tenderness or deformity. Heart:  Regular rate and rhythm, S1, S2 normal, 1/9 ASHA, click, rub or gallop. Abdomen:   Soft, non-tender. Bowel sounds normal. No masses,  No organomegaly. : corcoran in place, L>R labia indurated and red, L glut and perirectal area indurated- less swollen than a few days ago   Extremities: Extremities normal, atraumatic, no cyanosis or edema. No calf tenderness or cords. Pulses: 2+ and symmetric all extremities. Skin: Skin color, texture, turgor normal. No rashes or lesions   Neurologic: CNII-XII intact. Alert and oriented X 3. Fine motor of hands and fingers normal.   equal.  Gait not tested at this time. Sensation grossly normal to touch.   Gross motor of extremities normal.       Data Review:       Recent Days:  Recent Labs      18   0300  18   0625  18   0402   WBC  22.5*  23.4*  20.5*   HGB  8.9*  9.1*  9.6*   HCT  28.7*  29.1*  30.5*   PLT 91*  71*  54*     Recent Labs      07/22/18   0300  07/21/18   0625  07/20/18   0402   NA   --    --   137   K   --    --   4.0   CL   --    --   105   CO2   --    --   25   GLU   --    --   99   BUN   --    --   13   CREA   --    --   0.85   CA   --    --   8.2*   MG  2.0  1.9  1.8   ALB   --    --   2.5*   SGOT   --    --   61*   ALT   --    --   60     No results for input(s): PH, PCO2, PO2, HCO3, FIO2 in the last 72 hours. 24 Hour Results:  Recent Results (from the past 24 hour(s))   MAGNESIUM    Collection Time: 07/22/18  3:00 AM   Result Value Ref Range    Magnesium 2.0 1.6 - 2.4 mg/dL   CBC WITH AUTOMATED DIFF    Collection Time: 07/22/18  3:00 AM   Result Value Ref Range    WBC 22.5 (H) 3.6 - 11.0 K/uL    RBC 3.01 (L) 3.80 - 5.20 M/uL    HGB 8.9 (L) 11.5 - 16.0 g/dL    HCT 28.7 (L) 35.0 - 47.0 %    MCV 95.3 80.0 - 99.0 FL    MCH 29.6 26.0 - 34.0 PG    MCHC 31.0 30.0 - 36.5 g/dL    RDW 18.9 (H) 11.5 - 14.5 %    PLATELET 91 (L) 637 - 400 K/uL    NRBC 4.5 (H) 0  WBC    ABSOLUTE NRBC 1.01 (H) 0.00 - 0.01 K/uL    NEUTROPHILS 42 32 - 75 %    BAND NEUTROPHILS 9 (H) 0 - 6 %    LYMPHOCYTES 9 (L) 12 - 49 %    MONOCYTES 21 (H) 5 - 13 %    EOSINOPHILS 0 0 - 7 %    BASOPHILS 1 0 - 1 %    METAMYELOCYTES 12 (H) 0 %    MYELOCYTES 4 (H) 0 %    PROMYELOCYTES 2 (H) 0 %    IMMATURE GRANULOCYTES 0 %    ABS. NEUTROPHILS 11.5 (H) 1.8 - 8.0 K/UL    ABS. LYMPHOCYTES 2.0 0.8 - 3.5 K/UL    ABS. MONOCYTES 4.7 (H) 0.0 - 1.0 K/UL    ABS. EOSINOPHILS 0.0 0.0 - 0.4 K/UL    ABS. BASOPHILS 0.2 (H) 0.0 - 0.1 K/UL    ABS. IMM.  GRANS. 0.0 K/UL    DF MANUAL      RBC COMMENTS ANISOCYTOSIS  1+        RBC COMMENTS STOMATOCYTES  2+       VANCOMYCIN, TROUGH    Collection Time: 07/22/18  3:00 AM   Result Value Ref Range    Vancomycin,trough 15.5 (H) 5.0 - 10.0 ug/mL    Reported dose date: 20180721      Reported dose time: 1600      Reported dose: 1000 MG UNITS       Medications reviewed  Current Facility-Administered Medications Medication Dose Route Frequency    potassium chloride (KLOR-CON) packet 20 mEq  20 mEq Oral DAILY    senna-docusate (PERICOLACE) 8.6-50 mg per tablet 1 Tab  1 Tab Oral BID    senna (SENOKOT) tablet 43 mg  5 Tab Oral QHS    vancomycin (VANCOCIN) 1,000 mg in 0.9% sodium chloride (MBP/ADV) 250 mL  1,000 mg IntraVENous Q12H    piperacillin-tazobactam (ZOSYN) 3.375 g in 0.9% sodium chloride (MBP/ADV) 100 mL  3.375 g IntraVENous Q8H    ruxolitinib tab 15 mg (Patient Supplied)  15 mg Oral DAILY    aluminum-magnesium hydroxide (MAALOX) oral suspension 30 mL  30 mL Oral QID PRN    promethazine (PHENERGAN) 25 mg in NS IVPB  25 mg IntraVENous Q8H PRN    pantoprazole (PROTONIX) tablet 40 mg  40 mg Oral ACB    oxyCODONE-acetaminophen (PERCOCET) 5-325 mg per tablet 1 Tab  1 Tab Oral Q4H PRN    HYDROmorphone (DILAUDID) tablet 1 mg  1 mg Oral Q4H PRN    sodium chloride (NS) flush 5-10 mL  5-10 mL IntraVENous PRN    sodium chloride (NS) flush 5-10 mL  5-10 mL IntraVENous Q8H    sodium chloride (NS) flush 5-10 mL  5-10 mL IntraVENous PRN    acetaminophen (TYLENOL) tablet 650 mg  650 mg Oral Q4H PRN    ondansetron (ZOFRAN) injection 4 mg  4 mg IntraVENous Q4H PRN    0.9% sodium chloride infusion 250 mL  250 mL IntraVENous PRN     Care Plan discussed with: Patient and Nurse  Total time spent with patient: 30 minutes.     Jefry Sam MD

## 2018-07-22 NOTE — PROGRESS NOTES
Bedside and Verbal shift change report given to [place] (oncoming nurse) by Bassem Kumar RN (offgoing nurse). Report included the following information SBAR, Kardex, MAR, Recent Results and Cardiac Rhythm NSR.

## 2018-07-23 LAB
ALBUMIN SERPL-MCNC: 2.7 G/DL (ref 3.5–5)
ALBUMIN/GLOB SERPL: 0.6 {RATIO} (ref 1.1–2.2)
ALP SERPL-CCNC: 154 U/L (ref 45–117)
ALT SERPL-CCNC: 38 U/L (ref 12–78)
ANION GAP SERPL CALC-SCNC: 8 MMOL/L (ref 5–15)
AST SERPL-CCNC: 39 U/L (ref 15–37)
BASOPHILS # BLD: 0 K/UL (ref 0–0.1)
BASOPHILS NFR BLD: 0 % (ref 0–1)
BILIRUB SERPL-MCNC: 0.7 MG/DL (ref 0.2–1)
BUN SERPL-MCNC: 10 MG/DL (ref 6–20)
BUN/CREAT SERPL: 12 (ref 12–20)
CALCIUM SERPL-MCNC: 8.4 MG/DL (ref 8.5–10.1)
CHLORIDE SERPL-SCNC: 105 MMOL/L (ref 97–108)
CO2 SERPL-SCNC: 27 MMOL/L (ref 21–32)
CREAT SERPL-MCNC: 0.85 MG/DL (ref 0.55–1.02)
DIFFERENTIAL METHOD BLD: ABNORMAL
EOSINOPHIL # BLD: 0.2 K/UL (ref 0–0.4)
EOSINOPHIL NFR BLD: 1 % (ref 0–7)
ERYTHROCYTE [DISTWIDTH] IN BLOOD BY AUTOMATED COUNT: 18.6 % (ref 11.5–14.5)
GLOBULIN SER CALC-MCNC: 4.2 G/DL (ref 2–4)
GLUCOSE SERPL-MCNC: 107 MG/DL (ref 65–100)
HCT VFR BLD AUTO: 29.3 % (ref 35–47)
HGB BLD-MCNC: 9 G/DL (ref 11.5–16)
IMM GRANULOCYTES # BLD: 0 K/UL
IMM GRANULOCYTES NFR BLD AUTO: 0 %
LYMPHOCYTES # BLD: 3.8 K/UL (ref 0.8–3.5)
LYMPHOCYTES NFR BLD: 16 % (ref 12–49)
MAGNESIUM SERPL-MCNC: 2.1 MG/DL (ref 1.6–2.4)
MCH RBC QN AUTO: 29.5 PG (ref 26–34)
MCHC RBC AUTO-ENTMCNC: 30.7 G/DL (ref 30–36.5)
MCV RBC AUTO: 96.1 FL (ref 80–99)
METAMYELOCYTES NFR BLD MANUAL: 8 %
MONOCYTES # BLD: 5.5 K/UL (ref 0–1)
MONOCYTES NFR BLD: 23 % (ref 5–13)
NEUTS BAND NFR BLD MANUAL: 5 % (ref 0–6)
NEUTS SEG # BLD: 11.9 K/UL (ref 1.8–8)
NEUTS SEG NFR BLD: 45 % (ref 32–75)
NRBC # BLD: 1.65 K/UL (ref 0–0.01)
NRBC BLD-RTO: 6.9 PER 100 WBC
PLATELET # BLD AUTO: 99 K/UL (ref 150–400)
POTASSIUM SERPL-SCNC: 4 MMOL/L (ref 3.5–5.1)
PROMYELOCYTES NFR BLD MANUAL: 2 %
PROT SERPL-MCNC: 6.9 G/DL (ref 6.4–8.2)
RBC # BLD AUTO: 3.05 M/UL (ref 3.8–5.2)
RBC MORPH BLD: ABNORMAL
SODIUM SERPL-SCNC: 140 MMOL/L (ref 136–145)
WBC # BLD AUTO: 23.8 K/UL (ref 3.6–11)

## 2018-07-23 PROCEDURE — 74011250637 HC RX REV CODE- 250/637: Performed by: FAMILY MEDICINE

## 2018-07-23 PROCEDURE — 74011250637 HC RX REV CODE- 250/637: Performed by: INTERNAL MEDICINE

## 2018-07-23 PROCEDURE — 83735 ASSAY OF MAGNESIUM: CPT | Performed by: FAMILY MEDICINE

## 2018-07-23 PROCEDURE — 80053 COMPREHEN METABOLIC PANEL: CPT | Performed by: FAMILY MEDICINE

## 2018-07-23 PROCEDURE — 36415 COLL VENOUS BLD VENIPUNCTURE: CPT | Performed by: FAMILY MEDICINE

## 2018-07-23 PROCEDURE — 74011250636 HC RX REV CODE- 250/636: Performed by: INTERNAL MEDICINE

## 2018-07-23 PROCEDURE — 74011250636 HC RX REV CODE- 250/636: Performed by: FAMILY MEDICINE

## 2018-07-23 PROCEDURE — 74011000258 HC RX REV CODE- 258: Performed by: INTERNAL MEDICINE

## 2018-07-23 PROCEDURE — 65660000000 HC RM CCU STEPDOWN

## 2018-07-23 PROCEDURE — 97530 THERAPEUTIC ACTIVITIES: CPT

## 2018-07-23 PROCEDURE — 85025 COMPLETE CBC W/AUTO DIFF WBC: CPT | Performed by: FAMILY MEDICINE

## 2018-07-23 PROCEDURE — 97116 GAIT TRAINING THERAPY: CPT

## 2018-07-23 RX ORDER — AMOXICILLIN AND CLAVULANATE POTASSIUM 875; 125 MG/1; MG/1
1 TABLET, FILM COATED ORAL EVERY 12 HOURS
Qty: 12 TAB | Refills: 0 | Status: SHIPPED | OUTPATIENT
Start: 2018-07-23

## 2018-07-23 RX ORDER — POTASSIUM CHLORIDE 1.5 G/1.77G
20 POWDER, FOR SOLUTION ORAL DAILY
Qty: 15 PACKET | Refills: 0 | Status: SHIPPED | OUTPATIENT
Start: 2018-07-24

## 2018-07-23 RX ORDER — SENNOSIDES 8.6 MG/1
5 TABLET ORAL
Qty: 30 TAB | Refills: 0 | Status: SHIPPED
Start: 2018-07-23

## 2018-07-23 RX ORDER — DOXYCYCLINE HYCLATE 100 MG
100 TABLET ORAL EVERY 12 HOURS
Qty: 12 TAB | Refills: 0 | Status: SHIPPED | OUTPATIENT
Start: 2018-07-23

## 2018-07-23 RX ORDER — AMOXICILLIN AND CLAVULANATE POTASSIUM 875; 125 MG/1; MG/1
1 TABLET, FILM COATED ORAL EVERY 12 HOURS
Status: DISCONTINUED | OUTPATIENT
Start: 2018-07-23 | End: 2018-07-26

## 2018-07-23 RX ORDER — DOXYCYCLINE HYCLATE 100 MG
100 TABLET ORAL EVERY 12 HOURS
Status: DISCONTINUED | OUTPATIENT
Start: 2018-07-23 | End: 2018-07-31 | Stop reason: HOSPADM

## 2018-07-23 RX ADMIN — PANTOPRAZOLE SODIUM 40 MG: 40 TABLET, DELAYED RELEASE ORAL at 06:55

## 2018-07-23 RX ADMIN — Medication 10 ML: at 06:56

## 2018-07-23 RX ADMIN — DOXYCYCLINE HYCLATE 100 MG: 100 TABLET, COATED ORAL at 20:19

## 2018-07-23 RX ADMIN — AMOXICILLIN AND CLAVULANATE POTASSIUM 1 TABLET: 875; 125 TABLET, FILM COATED ORAL at 13:36

## 2018-07-23 RX ADMIN — ACETAMINOPHEN 650 MG: 325 TABLET ORAL at 22:37

## 2018-07-23 RX ADMIN — DOXYCYCLINE HYCLATE 100 MG: 100 TABLET, COATED ORAL at 13:37

## 2018-07-23 RX ADMIN — AMOXICILLIN AND CLAVULANATE POTASSIUM 1 TABLET: 875; 125 TABLET, FILM COATED ORAL at 20:19

## 2018-07-23 RX ADMIN — Medication 10 ML: at 20:20

## 2018-07-23 RX ADMIN — VANCOMYCIN HYDROCHLORIDE 1250 MG: 10 INJECTION, POWDER, LYOPHILIZED, FOR SOLUTION INTRAVENOUS at 02:42

## 2018-07-23 RX ADMIN — PIPERACILLIN SODIUM,TAZOBACTAM SODIUM 3.38 G: 3; .375 INJECTION, POWDER, FOR SOLUTION INTRAVENOUS at 06:55

## 2018-07-23 RX ADMIN — Medication 10 ML: at 13:37

## 2018-07-23 RX ADMIN — ACETAMINOPHEN 650 MG: 325 TABLET ORAL at 18:03

## 2018-07-23 NOTE — PROGRESS NOTES
Problem: Mobility Impaired (Adult and Pediatric)  Goal: *Acute Goals and Plan of Care (Insert Text)  Physical Therapy Goals  Initiated 7/19/2018  1. Patient will transfer from bed to chair and chair to bed with modified independence using the least restrictive device within 3 day(s). 3.  Patient will perform sit to stand with modified independence within 3 day(s). 4.  Patient will ambulate with modified independence for 300 feet with the least restrictive device within 3 day(s). 5.  Patient will ascend/descend 5 stairs with 1 handrail(s) with modified independence within 3 day(s). physical Therapy TREATMENT  Patient: Edil Jones (9 y.o. female)  Date: 7/23/2018  Diagnosis: Fever  PERIRECTAL ABSCESS Fever  Procedure(s) (LRB):  INCISION AND DRAINAGE, EXCISIONAL DEBRIDEMENT OF PERIRECTAL ABSCESS (N/A) 9 Days Post-Op  Precautions:    Chart, physical therapy assessment, plan of care and goals were reviewed. ASSESSMENT:  Patient able to increase ambulation distance to 200 feet with RW, SBA. Patient requires occasional verbal cuing for RW management due to increased anterior displacement of walker. Patient is MOD I for transfers and bed mobility. Patient continues to progress, recommend home health at discharge. Progression toward goals:  [x]    Improving appropriately and progressing toward goals  []    Improving slowly and progressing toward goals  []    Not making progress toward goals and plan of care will be adjusted     PLAN:  Patient continues to benefit from skilled intervention to address the above impairments. Continue treatment per established plan of care. Discharge Recommendations:  Home Health  Further Equipment Recommendations for Discharge:  RW delivered and fitted     SUBJECTIVE:   Patient stated i need to walk more.     OBJECTIVE DATA SUMMARY:   Critical Behavior:              Functional Mobility Training:  Bed Mobility:  Rolling: Modified independent  Supine to Sit: Modified independent              Transfers:  Sit to Stand: Modified independent  Stand to Sit: Modified independent        Bed to Chair: Modified independent                    Balance:  Sitting: Intact  Standing: Intact; With support  Ambulation/Gait Training:  Distance (ft): 200 Feet (ft)  Assistive Device: Gait belt;Walker, rolling  Ambulation - Level of Assistance: Supervision                                               Stairs:              Neuro Re-Education:    Therapeutic Exercises:     Pain:  Pain Scale 1: Numeric (0 - 10)  Pain Intensity 1: 0              Activity Tolerance:   giood- no complications  Please refer to the flowsheet for vital signs taken during this treatment.   After treatment:   [x]    Patient left in no apparent distress sitting up in chair  []    Patient left in no apparent distress in bed  [x]    Call bell left within reach  [x]    Nursing notified  [x]    Caregiver present  []    Bed alarm activated    COMMUNICATION/COLLABORATION:   The patients plan of care was discussed with: Registered Nurse    Tr Loyola, PT, DPT   Time Calculation: 24 mins

## 2018-07-23 NOTE — PROGRESS NOTES
[facility] Progress Note           Mel Camarena DO    [phone] Office  [phone]  Fax    2018      Assessment & Plan:   1. Perirectal abscess. S/p I&D . Cultures growing ecoli. Abx changed to pip-tazo . Patient continues to improve. Will deescalate abx to augmentin/doxy. Plan to treat through     2. Myelofibrosis. Anemia/Thrombocytopenia Follows up at [facility].    3. Leukocytosis. Related to MF  4. Immunosuppressed host.  [employer] restarted at 1/2 dose. 5. CHF. Cardiology following          Subjective:     Anxious for discharge    Objective:     Vitals:   Visit Vitals    /74    Pulse 89    Temp 98.7 °F (37.1 °C)    Resp 19    Ht 5' 6\" (1.676 m)    Wt 66.1 kg (145 lb 12.8 oz)    SpO2 95%    BMI 23.53 kg/m2        Tmax:  Temp (24hrs), Av.6 °F (37 °C), Min:98.3 °F (36.8 °C), Max:98.8 °F (37.1 °C)      Exam:   Patient is intubated:  no    Physical Examination:   General:  Alert, cooperative, no distress   Head:  Normocephalic, atraumatic. Eyes:  Conjunctivae clear   Neck:        Lungs:   No distress. Chest wall:     Heart:     Abdomen:      Extremities: Moves all. Skin: Vulvar erythema resolved   Neurologic: CNII-XII intact. Labs:        No lab exists for component: ITNL   No results for input(s): CPK, CKMB, TROIQ in the last 72 hours. Recent Labs      18   0126  18   0300  18   0625   NA  140   --    --    K  4.0   --    --    CL  105   --    --    CO2  27   --    --    BUN  10   --    --    CREA  0.85   --    --    GLU  107*   --    --    MG  2.1  2.0  1.9   ALB  2.7*   --    --    WBC  23.8*  22.5*  23.4*   HGB  9.0*  8.9*  9.1*   HCT  29.3*  28.7*  29.1*   PLT  99*  91*  71*     No results for input(s): INR, PTP, APTT in the last 72 hours.     No lab exists for component: INREXT, INREXT  Needs: urine analysis, urine sodium, protein and creatinine  No results found for: ANKUSH, CREAU      Cultures:     No results found for: SDES  Lab Results   Component Value Date/Time    Culture result: LIGHT ESCHERICHIA COLI (A) 07/14/2018 02:04 PM    Culture result: NO ANAEROBES ISOLATED 07/14/2018 02:04 PM    Culture result: NO GROWTH 6 DAYS 07/13/2018 09:36 PM       Radiology:     Medications       Current Facility-Administered Medications   Medication Dose Route Frequency Last Dose    vancomycin (VANCOCIN) 1,250 mg in 0.9% sodium chloride 250 mL IVPB  1,250 mg IntraVENous Q12H 1,250 mg at 07/23/18 0242    potassium chloride (KLOR-CON) packet 20 mEq  20 mEq Oral DAILY      senna-docusate (PERICOLACE) 8.6-50 mg per tablet 1 Tab  1 Tab Oral BID 1 Tab at 07/19/18 2256    senna (SENOKOT) tablet 43 mg  5 Tab Oral QHS 43 mg at 07/19/18 2256    piperacillin-tazobactam (ZOSYN) 3.375 g in 0.9% sodium chloride (MBP/ADV) 100 mL  3.375 g IntraVENous Q8H 3.375 g at 07/23/18 0655    ruxolitinib tab 15 mg (Patient Supplied)  15 mg Oral DAILY 15 mg at 07/23/18 0574    aluminum-magnesium hydroxide (MAALOX) oral suspension 30 mL  30 mL Oral QID PRN 30 mL at 07/16/18 2147    promethazine (PHENERGAN) 25 mg in NS IVPB  25 mg IntraVENous Q8H PRN 25 mg at 07/16/18 0919    pantoprazole (PROTONIX) tablet 40 mg  40 mg Oral ACB 40 mg at 07/23/18 0655    oxyCODONE-acetaminophen (PERCOCET) 5-325 mg per tablet 1 Tab  1 Tab Oral Q4H PRN 1 Tab at 07/16/18 0120    HYDROmorphone (DILAUDID) tablet 1 mg  1 mg Oral Q4H PRN      sodium chloride (NS) flush 5-10 mL  5-10 mL IntraVENous PRN      sodium chloride (NS) flush 5-10 mL  5-10 mL IntraVENous Q8H 10 mL at 07/23/18 0656    sodium chloride (NS) flush 5-10 mL  5-10 mL IntraVENous PRN      acetaminophen (TYLENOL) tablet 650 mg  650 mg Oral Q4H  mg at 07/22/18 2024    ondansetron (ZOFRAN) injection 4 mg  4 mg IntraVENous Q4H PRN 4 mg at 07/16/18 0645    0.9% sodium chloride infusion 250 mL  250 mL IntraVENous PRN             Case discussed with:      Italia Cid DO

## 2018-07-23 NOTE — PROGRESS NOTES
Spiritual Care Assessment/Progress Note  [address]      NAME: Rogerio Valenzuela      MRN: [id number]  AGE: 75 y.o.  SEX: female  Methodist Affiliation: No Protestant   Language: English     7/23/2018     Total Time (in minutes): 5     Spiritual Assessment begun in SF 3 PRO CARE TELE 2 through conversation with:         [x]Patient        [] Family    [] Friend(s)        Reason for Consult: Initial/Spiritual assessment, patient floor     Spiritual beliefs: (Please include comment if needed)     [] Identifies with a devon tradition:         [] Supported by a devon community:            [] Claims no spiritual orientation:           [] Seeking spiritual identity:                [] Adheres to an individual form of spirituality:           [x] Not able to assess:                           Identified resources for coping:      [] Prayer                               [] Music                  [] Guided Imagery     [x] Family/friends                 [] Pet visits     [] Devotional reading                         [] Unknown     [] Other:                                              Interventions offered during this visit: (See comments for more details)    Patient Interventions: Affirmation of emotions/emotional suffering, Catharsis/review of pertinent events in supportive environment, Normalization of emotional/spiritual concerns           Plan of Care:     [x] Support spiritual and/or cultural needs    [] Support AMD and/or advance care planning process      [] Support grieving process   [] Coordinate Rites and/or Rituals    [] Coordination with community clergy   [] No spiritual needs identified at this time   [] Detailed Plan of Care below (See Comments)  [] Make referral to Music Therapy  [] Make referral to Pet Therapy     [] Make referral to Addiction services  [] Make referral to [facility]  [] Make referral to Spiritual Care Partner  [] No future visits requested        [] Follow up visits as needed Comments:  is visiting for an initial spiritual assessment with pt in room 322. Pt notes she is doing better today. No immediate spiritual needs at the time.      [address] Meseret Greenberg M.Div, M.S, [address] available at [address] [place] [phone]

## 2018-07-23 NOTE — PROGRESS NOTES
SURGERY PROGRESS NOTE      Admit Date: 2018    POD 9 Days Post-Op    Procedure: Procedure(s):  INCISION AND DRAINAGE, EXCISIONAL DEBRIDEMENT OF PERIRECTAL ABSCESS      Subjective:     Patient states she feels better. Objective:     Visit Vitals    /74    Pulse 84    Temp 98.7 °F (37.1 °C)    Resp 19    Ht 5' 6\" (1.676 m)    Wt 145 lb 12.8 oz (66.1 kg)    SpO2 95%    BMI 23.53 kg/m2        Temp (24hrs), Av.5 °F (36.9 °C), Min:98.3 °F (36.8 °C), Max:98.7 °F (37.1 °C)         1901 -  0700  In: 1689 [P.O.:240; I.V.:2000]  Out: 3600 [Urine:3600]    Physical Exam:    General:  alert, cooperative, no distress, appears stated age   Incision:   2 cm open wound with necrotic, indurated fat and skin along the inferior edge for 1 cm. Lab Results  Component Value Date/Time   WBC 23.8 (H) 2018 01:26 AM   HGB 9.0 (L) 2018 01:26 AM   HCT 29.3 (L) 2018 01:26 AM   PLATELET 99 (L)  01:26 AM   MCV 96.1 2018 01:26 AM     Lab Results  Component Value Date/Time   GFR est non-AA >60 2018 01:26 AM   GFR est AA >60 2018 01:26 AM   Creatinine 0.85 2018 01:26 AM   BUN 10 2018 01:26 AM   Sodium 140 2018 01:26 AM   Potassium 4.0 2018 01:26 AM   Chloride 105 2018 01:26 AM   CO2 27 2018 01:26 AM   Magnesium 2.1 2018 01:26 AM       Assessment:     Principal Problem:    Fever (2018)    Active Problems:    Myelofibrosis (HCC) (2018)      Thrombocytopenia ([address] (2018)      Hypokalemia (2018)      Leukocytosis (2018)      Nausea and vomiting (2018)      SIRS (systemic inflammatory response syndrome) ([facility] (2018)      Rhona-rectal abscess (2018)    patient has developed skin and fat necrosis surround the wound. Needs debridement. I suspect it is the cause of her persistent leukocytosis    Plan:        To OR for debridement in the AM  Keep corcoran for now due to up coming surgery

## 2018-07-23 NOTE — PROGRESS NOTES
Daily Progress Note: 7/23/2018  Carol Diop MD    Assessment/Plan:   Sepsis POA due to below:Fever (7/13/2018)/  Leukocytosis (7/14/2018)/  SIRS (systemic inflammatory response syndrome) ([facility] (7/14/2018): in the setting of immunosuppression.   --broad spectrum abx - pip-tazo   --WBC still up to 23      Rhona-rectal abscess (7/14/2018): left side. Likely source of fever. IV antibiotics as above. S/p I+D on 7/14; wound cultures pending  --per surgery- will re consult for tomorrow  --pelvic CT did not show pelvic abscess- repeat CT neg  --continue catheter     Myelofibrosis ([facility] (7/14/2018)/ Anemia/Thrombocytopenia ([facility] (7/14/2018): follows up at [facility]. Hgb up to 8s after 2 units of PRBCs 7/14  --heme following     Hypokalemia (7/14/2018): likely from vomiting. Replete and follow K+     Nausea and vomiting (7/14/2018): likely from infection as above. Hydrate. IV anti-emetics. Monitor    Constipation  --Stool softeners daily  --Stop Miralax as this has not been effective in the past     Code Status:  Full     Problem List:  Problem List as of 7/23/2018  Date Reviewed: 7/14/2018          Codes Class Noted - Resolved    Myelofibrosis ([facility] ICD-10-CM: D75.81  ICD-9-CM: 289.83  7/14/2018 - Present        Thrombocytopenia ([facility] ICD-10-CM: D69.6  ICD-9-CM: 287.5  7/14/2018 - Present        Hypokalemia ICD-10-CM: E87.6  ICD-9-CM: 276.8  7/14/2018 - Present        Leukocytosis ICD-10-CM: O91.572  ICD-9-CM: 288.60  7/14/2018 - Present        Nausea and vomiting ICD-10-CM: R11.2  ICD-9-CM: 787.01  7/14/2018 - Present        SIRS (systemic inflammatory response syndrome) ([facility] ICD-10-CM: R65.10  ICD-9-CM: 995.90  7/14/2018 - Present        Rhona-rectal abscess ICD-10-CM: K61.1  ICD-9-CM: 040  7/14/2018 - Present        * (Principal)Fever ICD-10-CM: R50.9  ICD-9-CM: 780.60  7/13/2018 - Present              Subjective:   Ms. Bhavani Barakat is a 75 y.o. female who is being admitted for Fever. Ms. Bhavani Barakat presented to our Emergency Department today complaining of intermittent fever and chills associated with generalized weakness. She has also been having nausea and vomiting but denies any headaches, flu like or respiratory symptoms, No abdominal discomfort or diarrhea. She has not has any urinary symptoms. No overt focal symptoms. She does have a Hx of myelofibrosis and follows up at [facility]. Work up thus far has been unremarkable. In light of her immunosuppression, she will be admitted for further management. [Dr Tram Burgos     7/14: Pt found to have perirectal abscess. Afebrile since 5AM.  Still nauseated. NPO for I+D in OR today with Dr Obdulia Srinivasan. 7/15: continues to be nauseated. Labs pending this AM.  Surgeon doesn't feel it is much better despite I+D yesterday. Pt says pain is better controlled with percocet. 7/16/18: K+ 2.6. Repleting with IV. (6 total) WBC a little better. Tmax 98.8.     7/17:  Pt reports she is feeling somewhat better. Dr Lauren Schmitt from ([address] [place] [address]) called last night (see note from last night) and wanted Mario 2 inhibitor restarted at 1/2 dose and weaned off from there. WBC remains 15K. K+ back down - replacing. Tmax 101.9. Tnow 98.6. Diuresed about a liter overnight. Checking Mag also. 7/18:  Reports \"starting to feel better. \"  Little pain from surg site. Tmax 99. K+ 3.1 - cont to replace. WBC remains at 15K. LFTs sl up - cont to monitor. 7/19/18: Less pain. Finally had a BM and feels better. WBC still up. Tmax 99.7    7/20:  She reports she feels better but remains very weak. Cont to need IV antibiotics. WBC up to 20K. Tmax 99. K+ normalized. LFTs better. 7/21:  Tmax 99. WBC up again. Will repeat CT of the pelvis as her WBC is higher. She is having less pain. 7/22/18: Feeling pretty well. WBC is still high but repeat CT was neg for abscess. Will ask surgery to see again tomorrow.      7/23/18: Continues to grad feel better and wants to go home today. Will ask surgery to see again and if nothing else needs to be done then home later today. She wants to leave corcoran in place as urine irritates her abscess site. WBC elevated but discussed with Heme-Onc and may be due to the Myelofibrosis but she is to follow up with Dr America Gibson at [facility] later this wk or early next wk. Will discuss with ID regarding outpt antibiotics. Review of Systems:   A comprehensive review of systems was negative except for that written in the HPI. Objective:   Physical Exam:     Visit Vitals    /48 (BP 1 Location: Left arm, BP Patient Position: At rest;Lying right side)    Pulse 80    Temp 98.3 °F (36.8 °C)    Resp 18    Ht 5' 6\" (1.676 m)    Wt 145 lb 12.8 oz (66.1 kg)    SpO2 96%    BMI 23.53 kg/m2    O2 Flow Rate (L/min): 2 l/min O2 Device: Room air    Temp (24hrs), Av.5 °F (36.9 °C), Min:98.3 °F (36.8 °C), Max:98.8 °F (37.1 °C)    1901 -  0700  In: 1140 [P.O.:240; I.V.:900]  Out: 1600 [Urine:1600]   701 -  1900  In: 9248 [P.O.:120; I.V.:1100]  Out: 3250 [Urine:3250]    General:  Alert, cooperative, no distress, appears stated age   Head:  Normocephalic, without obvious abnormality, atraumatic. Eyes:  Conjunctivae/corneas clear. PERRL, EOMs intact. Nose: Nares normal. Septum midline. Throat: Lips, mucosa, and tongue dry   Neck: Supple, symmetrical, trachea midline, no adenopathy, thyroid: no enlargement/tenderness/nodules, no carotid bruit and no JVD. Back:   Symmetric, no curvature. ROM normal. No CVA tenderness. Lungs:   A few basilar crackles bilaterally. Chest wall:  No tenderness or deformity. Heart:  Regular rate and rhythm, S1, S2 normal, 1/9 ASHA, click, rub or gallop. Abdomen:   Soft, non-tender. Bowel sounds normal. No masses,  No organomegaly. Extremities: Extremities normal, atraumatic, no cyanosis or edema. No calf tenderness or cords. Pulses: 2+ and symmetric all extremities.    Skin: Skin color, texture, turgor normal. No rashes or lesions   Neurologic: CNII-XII intact. Alert and oriented X 3. Fine motor of hands and fingers normal.   equal.  Gait not tested at this time. Sensation grossly normal to touch. Gross motor of extremities normal.       Data Review:       Recent Days:  Recent Labs      07/23/18   0126 07/22/18   0300  07/21/18   0625   WBC  23.8*  22.5*  23.4*   HGB  9.0*  8.9*  9.1*   HCT  29.3*  28.7*  29.1*   PLT  99*  91*  71*     Recent Labs      07/23/18   0126 07/22/18   0300  07/21/18   0625   NA  140   --    --    K  4.0   --    --    CL  105   --    --    CO2  27   --    --    GLU  107*   --    --    BUN  10   --    --    CREA  0.85   --    --    CA  8.4*   --    --    MG  2.1  2.0  1.9   ALB  2.7*   --    --    SGOT  39*   --    --    ALT  38   --    --      No results for input(s): PH, PCO2, PO2, HCO3, FIO2 in the last 72 hours. 24 Hour Results:  Recent Results (from the past 24 hour(s))   MAGNESIUM    Collection Time: 07/23/18  1:26 AM   Result Value Ref Range    Magnesium 2.1 1.6 - 2.4 mg/dL   CBC WITH AUTOMATED DIFF    Collection Time: 07/23/18  1:26 AM   Result Value Ref Range    WBC 23.8 (H) 3.6 - 11.0 K/uL    RBC 3.05 (L) 3.80 - 5.20 M/uL    HGB 9.0 (L) 11.5 - 16.0 g/dL    HCT 29.3 (L) 35.0 - 47.0 %    MCV 96.1 80.0 - 99.0 FL    MCH 29.5 26.0 - 34.0 PG    MCHC 30.7 30.0 - 36.5 g/dL    RDW 18.6 (H) 11.5 - 14.5 %    PLATELET 99 (L) 398 - 400 K/uL    NRBC 6.9 (H) 0  WBC    ABSOLUTE NRBC 1.65 (H) 0.00 - 0.01 K/uL    NEUTROPHILS 45 32 - 75 %    BAND NEUTROPHILS 5 0 - 6 %    LYMPHOCYTES 16 12 - 49 %    MONOCYTES 23 (H) 5 - 13 %    EOSINOPHILS 1 0 - 7 %    BASOPHILS 0 0 - 1 %    METAMYELOCYTES 8 (H) 0 %    PROMYELOCYTES 2 (H) 0 %    IMMATURE GRANULOCYTES 0 %    ABS. NEUTROPHILS 11.9 (H) 1.8 - 8.0 K/UL    ABS. LYMPHOCYTES 3.8 (H) 0.8 - 3.5 K/UL    ABS. MONOCYTES 5.5 (H) 0.0 - 1.0 K/UL    ABS. EOSINOPHILS 0.2 0.0 - 0.4 K/UL    ABS. BASOPHILS 0.0 0.0 - 0.1 K/UL    ABS. IMM. Vermell Hands. 0.0 K/UL    DF MANUAL      RBC COMMENTS STOMATOCYTES  2+        RBC COMMENTS ANISOCYTOSIS  1+        RBC COMMENTS POLYCHROMASIA  PRESENT       METABOLIC PANEL, COMPREHENSIVE    Collection Time: 07/23/18  1:26 AM   Result Value Ref Range    Sodium 140 136 - 145 mmol/L    Potassium 4.0 3.5 - 5.1 mmol/L    Chloride 105 97 - 108 mmol/L    CO2 27 21 - 32 mmol/L    Anion gap 8 5 - 15 mmol/L    Glucose 107 (H) 65 - 100 mg/dL    BUN 10 6 - 20 MG/DL    Creatinine 0.85 0.55 - 1.02 MG/DL    BUN/Creatinine ratio 12 12 - 20      GFR est AA >60 >60 ml/min/1.73m2    GFR est non-AA >60 >60 ml/min/1.73m2    Calcium 8.4 (L) 8.5 - 10.1 MG/DL    Bilirubin, total 0.7 0.2 - 1.0 MG/DL    ALT (SGPT) 38 12 - 78 U/L    AST (SGOT) 39 (H) 15 - 37 U/L    Alk.  phosphatase 154 (H) 45 - 117 U/L    Protein, total 6.9 6.4 - 8.2 g/dL    Albumin 2.7 (L) 3.5 - 5.0 g/dL    Globulin 4.2 (H) 2.0 - 4.0 g/dL    A-G Ratio 0.6 (L) 1.1 - 2.2         Medications reviewed  Current Facility-Administered Medications   Medication Dose Route Frequency    vancomycin (VANCOCIN) 1,250 mg in 0.9% sodium chloride 250 mL IVPB  1,250 mg IntraVENous Q12H    potassium chloride (KLOR-CON) packet 20 mEq  20 mEq Oral DAILY    senna-docusate (PERICOLACE) 8.6-50 mg per tablet 1 Tab  1 Tab Oral BID    senna (SENOKOT) tablet 43 mg  5 Tab Oral QHS    piperacillin-tazobactam (ZOSYN) 3.375 g in 0.9% sodium chloride (MBP/ADV) 100 mL  3.375 g IntraVENous Q8H    ruxolitinib tab 15 mg (Patient Supplied)  15 mg Oral DAILY    aluminum-magnesium hydroxide (MAALOX) oral suspension 30 mL  30 mL Oral QID PRN    promethazine (PHENERGAN) 25 mg in NS IVPB  25 mg IntraVENous Q8H PRN    pantoprazole (PROTONIX) tablet 40 mg  40 mg Oral ACB    oxyCODONE-acetaminophen (PERCOCET) 5-325 mg per tablet 1 Tab  1 Tab Oral Q4H PRN    HYDROmorphone (DILAUDID) tablet 1 mg  1 mg Oral Q4H PRN    sodium chloride (NS) flush 5-10 mL  5-10 mL IntraVENous PRN    sodium chloride (NS) flush 5-10 mL  5-10 mL IntraVENous Q8H    sodium chloride (NS) flush 5-10 mL  5-10 mL IntraVENous PRN    acetaminophen (TYLENOL) tablet 650 mg  650 mg Oral Q4H PRN    ondansetron (ZOFRAN) injection 4 mg  4 mg IntraVENous Q4H PRN    0.9% sodium chloride infusion 250 mL  250 mL IntraVENous PRN     Care Plan discussed with: Patien/Heme-Onc and Nurse  Total time spent with patient: 30 minutes.     Les Reyes MD

## 2018-07-23 NOTE — PROGRESS NOTES
1940 Bedside and Verbal shift change report given to Roshni Metzger (oncoming nurse) by Caryle Spring RN (offgoing nurse). Report included the following information SBAR, Kardex, Intake/Output, MAR, Recent Results and Cardiac Rhythm NSR.

## 2018-07-23 NOTE — PROGRESS NOTES
[facility] [place] at [facility] [address] [facility], [address] [facility] [address] [facility] W: [phone]  F: [phone] Reason for Visit:  
Rogerio Valenzuela is a 75 y.o. female who is seen in consultation at the request of Dr. Vinh Kramer for evaluation of Myelofibrosis. History of Present Illness:  
Patient is a 75 y. o. with PMF who is admitted on 7/14/18 with c/o weakness, N/V and intermittent fevers x 5 days. She has a known h/o UTIs. Noted to have anemia, thrombocytopenia and leucocytosis on admission. CXR and UA unremarkable. She was started on Levaquin and Vancomycin and underwent I & D for a perirectal abscess on 7/14/18. She states that she underwent a BMT in 2013 for MF but relapsed soon after. Has since been on Jakafi and follows with Hematology at [facility]. She has also been on Exjade for transfusion iron overload. Does not think she has an enlarged spleen. She relocated from [facility] last year but was in the process of moving back later in this week. In the last 3-4 days she noted weakness, confusion, intermittent fevers and urinary incontinence. 1 Day prior to presentation she thought she felt a swelling in her rectal area. Hence she presented to the ER when she was found to have a temp of 102 F Since the I and D she has had a Tmax of 100F. Has better alertness, still has pain in the perirectal area, has no chills or bleeding. Notes some nausea x 1 day. No CP, SOB, Cough, dysuria. Has not had a BM since 3 days but has no abdominal discomfort Interval History:  
Feeling better; waiting for surgery to come and clear her for discharge. Had BM yesterday and this am. Requesting BSC to go home. Denies any SOB or pain at present. Aware she needs to have close follow up with primary oncologist. Will make appt for later this week. Follows with Dr Maranda Leonard/oncologist at [facility].([phone]); I have spoken with her Past Medical History:  
Diagnosis Date  Myelofibrosis ([facility] [address] BMT in 2013 for MF but relapsed soon after. Has since been on Jakafi and follows with Hematology at [facility] Past Surgical History:  
Procedure Laterality Date  HX BONE MARROW TRANSPLANT  HX VASCULAR ACCESS    
 right portacath Social History Substance Use Topics  Smoking status: Never Smoker  Smokeless tobacco: Never Used  Alcohol use Yes Comment: seldom Family History Problem Relation Age of Onset  Heart Attack Mother Current Facility-Administered Medications Medication Dose Route Frequency  vancomycin (VANCOCIN) 1,250 mg in 0.9% sodium chloride 250 mL IVPB  1,250 mg IntraVENous Q12H  potassium chloride (KLOR-CON) packet 20 mEq  20 mEq Oral DAILY  senna-docusate (PERICOLACE) 8.6-50 mg per tablet 1 Tab  1 Tab Oral BID  senna (SENOKOT) tablet 43 mg  5 Tab Oral QHS  piperacillin-tazobactam (ZOSYN) 3.375 g in 0.9% sodium chloride (MBP/ADV) 100 mL  3.375 g IntraVENous Q8H  
 ruxolitinib tab 15 mg (Patient Supplied)  15 mg Oral DAILY  aluminum-magnesium hydroxide (MAALOX) oral suspension 30 mL  30 mL Oral QID PRN  promethazine (PHENERGAN) 25 mg in NS IVPB  25 mg IntraVENous Q8H PRN  pantoprazole (PROTONIX) tablet 40 mg  40 mg Oral ACB  oxyCODONE-acetaminophen (PERCOCET) 5-325 mg per tablet 1 Tab  1 Tab Oral Q4H PRN  
 HYDROmorphone (DILAUDID) tablet 1 mg  1 mg Oral Q4H PRN  
 sodium chloride (NS) flush 5-10 mL  5-10 mL IntraVENous PRN  
 sodium chloride (NS) flush 5-10 mL  5-10 mL IntraVENous Q8H  
 sodium chloride (NS) flush 5-10 mL  5-10 mL IntraVENous PRN  
 acetaminophen (TYLENOL) tablet 650 mg  650 mg Oral Q4H PRN  
 ondansetron (ZOFRAN) injection 4 mg  4 mg IntraVENous Q4H PRN  
 0.9% sodium chloride infusion 250 mL  250 mL IntraVENous PRN No Known Allergies Review of Systems: A complete review of systems was obtained, negative except as described above. Physical Exam:  
 
Visit Vitals  BP 123/73  Pulse 87  Temp 98.5 °F (36.9 °C)  Resp 17  Ht 5' 6\" (1.676 m)  Wt 66.1 kg (145 lb 12.8 oz)  SpO2 96%  BMI 23.53 kg/m2 ECOG PS: 2 General: No cute distress Eyes: PERRLA, anicteric sclerae HENT: Atraumatic, OP clear Neck: Supple Respiratory: normal respiratory effort CV:  regular rhythm, no murmurs, no peripheral edema GI: Soft, nontender, nondistended, no masses, no hepatomegaly, no splenomegaly MS:  Digits without clubbing or cyanosis. Skin: No rashes, ecchymoses, or petechiae. Normal temperature, turgor, and texture. Psych: Alert, oriented, appropriate affect, normal judgment/insight External genitalia Erythematous vulva. Packing noted Results:  
 
Lab Results Component Value Date/Time WBC 23.8 (H) 07/23/2018 01:26 AM  
 HGB 9.0 (L) 07/23/2018 01:26 AM  
 HCT 29.3 (L) 07/23/2018 01:26 AM  
 PLATELET 99 (L) 80/91/8203 01:26 AM  
 MCV 96.1 07/23/2018 01:26 AM  
 ABS. NEUTROPHILS 11.9 (H) 07/23/2018 01:26 AM  
 
Lab Results Component Value Date/Time Sodium 140 07/23/2018 01:26 AM  
 Potassium 4.0 07/23/2018 01:26 AM  
 Chloride 105 07/23/2018 01:26 AM  
 CO2 27 07/23/2018 01:26 AM  
 Glucose 107 (H) 07/23/2018 01:26 AM  
 BUN 10 07/23/2018 01:26 AM  
 Creatinine 0.85 07/23/2018 01:26 AM  
 GFR est AA >60 07/23/2018 01:26 AM  
 GFR est non-AA >60 07/23/2018 01:26 AM  
 Calcium 8.4 (L) 07/23/2018 01:26 AM  
 
Lab Results Component Value Date/Time Bilirubin, total 0.7 07/23/2018 01:26 AM  
 ALT (SGPT) 38 07/23/2018 01:26 AM  
 AST (SGOT) 39 (H) 07/23/2018 01:26 AM  
 Alk. phosphatase 154 (H) 07/23/2018 01:26 AM  
 Protein, total 6.9 07/23/2018 01:26 AM  
 Albumin 2.7 (L) 07/23/2018 01:26 AM  
 Globulin 4.2 (H) 07/23/2018 01:26 AM  
 
 
7/15/2018 CT PELV W CONT IMPRESSION: No pelvic abscess identified. 
   
7/15/2018 XR CHEST IMPRESSION:  
  
New development of mild to moderate interstitial edema.  Right perihilar airspace 
disease which may represent asymmetric pulmonary edema versus pneumonia. Assessment/Plan 1) Fever and darby rectal abscess S/p I & D,  
abx coverage:   Vancomycin and zosyn Rising leukocytosis may be from myelofibrosis and decreasing the dose of the [facility] EVI 2) Myelofibrosis Follows with heme/onc at [facility] : Dr Boss Number S/p BMT in 2013, relapsed and since on Jakafi at 15 mg BID Primary oncologist / Dr Frieda Gonzalez recommending resuming Jakafi at half dose; resumed at 15 mg daily. Will continue. Should not interfere with wound healing as long as wbc does not drop into the leukopenia range. Continue with daily CBC Recommend follow up with primary heme/onc at [facility] upon discharge: informed to make an appointment within a week from discharge 3) Thrombocytopenia Secondary to MF, Jakafi and acute infection Monitor Continues to improve 4) Anemia (s/p 2 units PRBCs) Secondary to MF Hgb stable Minimize transfusions and consider only if Hgb < 7 g/dl 5) Leukocytosis Secondary to MF and acute infection Blasts 5%- can be a result of acute infection. < 10% blasts can be seen with MF and does not indicate leukemic transformation. Blasts improved from what was seen from Dr. Darci Jordan office Rising leukocytosis may be from myelofibrosis and decreasing the dose of the [facility] EVI Continue to monitor 6) Iron overload Hold Exjade 7) Hypokalemia Received repletion Continue to monitor 8) Dispo In the process of moving: upon discharge [facility] [place] [address] Discussed with CM in case home health is needed at discharge. Plan reviewed with Dr June Reyes.  
 
Signed By: Shad Gaffney NP

## 2018-07-23 NOTE — PROGRESS NOTES
1940:  Bedside and Verbal shift change report given to [address] [place] [address], RN (oncoming nurse) by Ian Cedillo RN (offgoing nurse). Report included the following information SBAR, Kardex, ED Summary, [facility], Recent Results and Cardiac Rhythm NSR.     2024:  Patient stating wound area feels irritated and requesting Tylenol. Tylenol given. 0250:  Patient resting comfortably. 0730: Bedside and Verbal shift change report given to Roe Stephenson RN (oncoming nurse) by [address] [place] [address], RN (offgoing nurse). Report included the following information SBAR, Kardex, ED Summary, MAR, Recent Results and Cardiac Rhythm NSR.

## 2018-07-24 ENCOUNTER — ANESTHESIA EVENT (OUTPATIENT)
Dept: SURGERY | Age: 73
DRG: 854 | End: 2018-07-24
Payer: MEDICARE

## 2018-07-24 LAB
BASOPHILS # BLD: 0.2 K/UL (ref 0–0.1)
BASOPHILS NFR BLD: 1 % (ref 0–1)
DIFFERENTIAL METHOD BLD: ABNORMAL
EOSINOPHIL # BLD: 0 K/UL (ref 0–0.4)
EOSINOPHIL NFR BLD: 0 % (ref 0–7)
ERYTHROCYTE [DISTWIDTH] IN BLOOD BY AUTOMATED COUNT: 18.6 % (ref 11.5–14.5)
HCT VFR BLD AUTO: 28.1 % (ref 35–47)
HGB BLD-MCNC: 8.6 G/DL (ref 11.5–16)
IMM GRANULOCYTES # BLD: 0 K/UL
IMM GRANULOCYTES NFR BLD AUTO: 0 %
LYMPHOCYTES # BLD: 2.5 K/UL (ref 0.8–3.5)
LYMPHOCYTES NFR BLD: 11 % (ref 12–49)
MAGNESIUM SERPL-MCNC: 2.1 MG/DL (ref 1.6–2.4)
MCH RBC QN AUTO: 29.1 PG (ref 26–34)
MCHC RBC AUTO-ENTMCNC: 30.6 G/DL (ref 30–36.5)
MCV RBC AUTO: 94.9 FL (ref 80–99)
METAMYELOCYTES NFR BLD MANUAL: 6 %
MONOCYTES # BLD: 6.9 K/UL (ref 0–1)
MONOCYTES NFR BLD: 30 % (ref 5–13)
NEUTS BAND NFR BLD MANUAL: 7 % (ref 0–6)
NEUTS SEG # BLD: 11.5 K/UL (ref 1.8–8)
NEUTS SEG NFR BLD: 43 % (ref 32–75)
NRBC # BLD: 1.64 K/UL (ref 0–0.01)
NRBC BLD-RTO: 7.2 PER 100 WBC
PLATELET # BLD AUTO: 96 K/UL (ref 150–400)
PMV BLD AUTO: 11.8 FL (ref 8.9–12.9)
PROMYELOCYTES NFR BLD MANUAL: 2 %
RBC # BLD AUTO: 2.96 M/UL (ref 3.8–5.2)
RBC MORPH BLD: ABNORMAL
WBC # BLD AUTO: 22.9 K/UL (ref 3.6–11)

## 2018-07-24 PROCEDURE — 86870 RBC ANTIBODY IDENTIFICATION: CPT | Performed by: SURGERY

## 2018-07-24 PROCEDURE — 86902 BLOOD TYPE ANTIGEN DONOR EA: CPT | Performed by: SURGERY

## 2018-07-24 PROCEDURE — 74011250637 HC RX REV CODE- 250/637: Performed by: FAMILY MEDICINE

## 2018-07-24 PROCEDURE — 86921 COMPATIBILITY TEST INCUBATE: CPT | Performed by: SURGERY

## 2018-07-24 PROCEDURE — 36415 COLL VENOUS BLD VENIPUNCTURE: CPT | Performed by: FAMILY MEDICINE

## 2018-07-24 PROCEDURE — 86920 COMPATIBILITY TEST SPIN: CPT | Performed by: SURGERY

## 2018-07-24 PROCEDURE — 74011250637 HC RX REV CODE- 250/637: Performed by: INTERNAL MEDICINE

## 2018-07-24 PROCEDURE — 85025 COMPLETE CBC W/AUTO DIFF WBC: CPT | Performed by: FAMILY MEDICINE

## 2018-07-24 PROCEDURE — 77030020782 HC GWN BAIR PAWS FLX 3M -B

## 2018-07-24 PROCEDURE — 86922 COMPATIBILITY TEST ANTIGLOB: CPT | Performed by: SURGERY

## 2018-07-24 PROCEDURE — 86900 BLOOD TYPING SEROLOGIC ABO: CPT | Performed by: SURGERY

## 2018-07-24 PROCEDURE — 74011250636 HC RX REV CODE- 250/636

## 2018-07-24 PROCEDURE — 65270000029 HC RM PRIVATE

## 2018-07-24 PROCEDURE — 83735 ASSAY OF MAGNESIUM: CPT | Performed by: FAMILY MEDICINE

## 2018-07-24 RX ORDER — POTASSIUM CHLORIDE 750 MG/1
20 TABLET, FILM COATED, EXTENDED RELEASE ORAL 2 TIMES DAILY
Status: DISPENSED | OUTPATIENT
Start: 2018-07-24 | End: 2018-07-26

## 2018-07-24 RX ADMIN — PANTOPRAZOLE SODIUM 40 MG: 40 TABLET, DELAYED RELEASE ORAL at 08:40

## 2018-07-24 RX ADMIN — DOXYCYCLINE HYCLATE 100 MG: 100 TABLET, COATED ORAL at 21:16

## 2018-07-24 RX ADMIN — POTASSIUM CHLORIDE 20 MEQ: 750 TABLET, EXTENDED RELEASE ORAL at 14:00

## 2018-07-24 RX ADMIN — DOXYCYCLINE HYCLATE 100 MG: 100 TABLET, COATED ORAL at 08:37

## 2018-07-24 RX ADMIN — POTASSIUM CHLORIDE 20 MEQ: 750 TABLET, EXTENDED RELEASE ORAL at 18:00

## 2018-07-24 RX ADMIN — AMOXICILLIN AND CLAVULANATE POTASSIUM 1 TABLET: 875; 125 TABLET, FILM COATED ORAL at 08:40

## 2018-07-24 RX ADMIN — ACETAMINOPHEN 650 MG: 325 TABLET ORAL at 18:20

## 2018-07-24 RX ADMIN — SENNOSIDES AND DOCUSATE SODIUM 1 TABLET: 8.6; 5 TABLET ORAL at 08:38

## 2018-07-24 RX ADMIN — AMOXICILLIN AND CLAVULANATE POTASSIUM 1 TABLET: 875; 125 TABLET, FILM COATED ORAL at 21:16

## 2018-07-24 RX ADMIN — Medication 10 ML: at 07:02

## 2018-07-24 RX ADMIN — SENNOSIDES AND DOCUSATE SODIUM 1 TABLET: 8.6; 5 TABLET ORAL at 21:16

## 2018-07-24 NOTE — PROGRESS NOTES
[facility] Progress Note           Charmaine Brewer DO    [phone] Office  [phone]  Fax    2018      Assessment & Plan:   1. Perirectal abscess. S/p I&D . Cultures growing ecoli. Plans noted for repeat I&D by general surgery. Continue augmentin/doxy. 2. Myelofibrosis. Anemia/Thrombocytopenia Follows up at [facility].    3. Leukocytosis. Related to MF  4. Immunosuppressed host.  Arlester Cancel restarted at 1/2 dose. 5. CHF. Cardiology following          Subjective:     Frustrated about delay in surgery    Objective:     Vitals:   Visit Vitals    /71 (BP 1 Location: Left arm, BP Patient Position: At rest)    Pulse 90    Temp 98.9 °F (37.2 °C)    Resp 18    Ht 5' 6\" (1.676 m)    Wt 64.7 kg (142 lb 9.6 oz)    SpO2 96%    BMI 23.02 kg/m2        Tmax:  Temp (24hrs), Av.8 °F (37.1 °C), Min:98.3 °F (36.8 °C), Max:99.1 °F (37.3 °C)      Exam:   Patient is intubated:  no    Physical Examination:   General:  Alert, cooperative, no distress   Head:  Normocephalic, atraumatic. Eyes:  Conjunctivae clear   Neck:        Lungs:   No distress. Chest wall:     Heart:     Abdomen:      Extremities: Moves all. Skin:    Neurologic: CNII-XII intact. Labs:        No lab exists for component: ITNL   No results for input(s): CPK, CKMB, TROIQ in the last 72 hours. Recent Labs      18   0108  18   0126  18   0300   NA   --   140   --    K   --   4.0   --    CL   --   105   --    CO2   --   27   --    BUN   --   10   --    CREA   --   0.85   --    GLU   --   107*   --    MG  2.1  2.1  2.0   ALB   --   2.7*   --    WBC  22.9*  23.8*  22.5*   HGB  8.6*  9.0*  8.9*   HCT  28.1*  29.3*  28.7*   PLT  96*  99*  91*     No results for input(s): INR, PTP, APTT in the last 72 hours.     No lab exists for component: INREXT, INREXT  Needs: urine analysis, urine sodium, protein and creatinine  No results found for: ANKUSH, CREAU      Cultures:     No results found for: SDES  Lab Results   Component Value Date/Time    Culture result: LIGHT ESCHERICHIA COLI (A) 07/14/2018 02:04 PM    Culture result: NO ANAEROBES ISOLATED 07/14/2018 02:04 PM    Culture result: NO GROWTH 6 DAYS 07/13/2018 09:36 PM       Radiology:     Medications       Current Facility-Administered Medications   Medication Dose Route Frequency Last Dose    amoxicillin-clavulanate (AUGMENTIN) 875-125 mg per tablet 1 Tab  1 Tab Oral Q12H 1 Tab at 07/24/18 0840    doxycycline (VIBRA-TABS) tablet 100 mg  100 mg Oral Q12H 100 mg at 07/24/18 0837    potassium chloride (KLOR-CON) packet 20 mEq  20 mEq Oral DAILY      senna-docusate (PERICOLACE) 8.6-50 mg per tablet 1 Tab  1 Tab Oral BID 1 Tab at 07/24/18 0838    senna (SENOKOT) tablet 43 mg  5 Tab Oral QHS Stopped at 07/23/18 2200    ruxolitinib tab 15 mg (Patient Supplied)  15 mg Oral DAILY 15 mg at 07/24/18 1014    aluminum-magnesium hydroxide (MAALOX) oral suspension 30 mL  30 mL Oral QID PRN 30 mL at 07/16/18 2147    promethazine (PHENERGAN) 25 mg in NS IVPB  25 mg IntraVENous Q8H PRN 25 mg at 07/16/18 0919    pantoprazole (PROTONIX) tablet 40 mg  40 mg Oral ACB 40 mg at 07/24/18 0840    oxyCODONE-acetaminophen (PERCOCET) 5-325 mg per tablet 1 Tab  1 Tab Oral Q4H PRN 1 Tab at 07/16/18 0120    HYDROmorphone (DILAUDID) tablet 1 mg  1 mg Oral Q4H PRN      sodium chloride (NS) flush 5-10 mL  5-10 mL IntraVENous PRN      sodium chloride (NS) flush 5-10 mL  5-10 mL IntraVENous Q8H 10 mL at 07/24/18 0702    sodium chloride (NS) flush 5-10 mL  5-10 mL IntraVENous PRN      acetaminophen (TYLENOL) tablet 650 mg  650 mg Oral Q4H  mg at 07/23/18 2237    ondansetron (ZOFRAN) injection 4 mg  4 mg IntraVENous Q4H PRN 4 mg at 07/16/18 0645    0.9% sodium chloride infusion 250 mL  250 mL IntraVENous PRN             Case discussed with:      Lissy Looney DO

## 2018-07-24 NOTE — ROUTINE PROCESS
Bedside shift change report given to Chanel Nunez (oncoming nurse) by Dionicio Hicks (offgoing nurse). Report included the following information SBAR, Kardex, Intake/Output, MAR, Accordion and Recent Results.

## 2018-07-24 NOTE — PROGRESS NOTES
Verbal report given via telephone to [employer] on $th floor. SBAR, Labs, Wound Care, [facility] & Plan given.

## 2018-07-24 NOTE — PROGRESS NOTES
[facility] at [address], [address] [place] [address] W: [phone]  F: [phone] Reason for Visit:  
Melissa Goode is a 75 y.o. female who is seen in consultation at the request of Dr. Senthil Almanzar for evaluation of Myelofibrosis. History of Present Illness:  
Patient is a 75 y. o. with PMF who is admitted on 7/14/18 with c/o weakness, N/V and intermittent fevers x 5 days. She has a known h/o UTIs. Noted to have anemia, thrombocytopenia and leucocytosis on admission. CXR and UA unremarkable. She was started on Levaquin and Vancomycin and underwent I & D for a perirectal abscess on 7/14/18. She states that she underwent a BMT in 2013 for MF but relapsed soon after. Has since been on Jakafi and follows with Hematology at [facility]. She has also been on Exjade for transfusion iron overload. Does not think she has an enlarged spleen. She relocated from [facility] last year but was in the process of moving back later in this week. In the last 3-4 days she noted weakness, confusion, intermittent fevers and urinary incontinence. 1 Day prior to presentation she thought she felt a swelling in her rectal area. Hence she presented to the ER when she was found to have a temp of 102 F Since the I and D she has had a Tmax of 100F. Has better alertness, still has pain in the perirectal area, has no chills or bleeding. Notes some nausea x 1 day. No CP, SOB, Cough, dysuria. Has not had a BM since 3 days but has no abdominal discomfort Interval History:  
Frustrated with cancellation of procedure today. Awaiting to hear when it will be rescheduled. Aware she needs to have close follow up with primary oncologist. Will make appt for later this week. Follows with Dr Eduardo Leonard/oncologist at [facility].([phone]); I have spoken with her Past Medical History:  
Diagnosis Date  Myelofibrosis ([facility] [address] BMT in 2013 for MF but relapsed soon after.  Has since been on Jakafi and follows with Hematology at [facility] Past Surgical History:  
Procedure Laterality Date  HX BONE MARROW TRANSPLANT  HX VASCULAR ACCESS    
 right portacath Social History Substance Use Topics  Smoking status: Never Smoker  Smokeless tobacco: Never Used  Alcohol use Yes Comment: seldom Family History Problem Relation Age of Onset  Heart Attack Mother Current Facility-Administered Medications Medication Dose Route Frequency  amoxicillin-clavulanate (AUGMENTIN) 875-125 mg per tablet 1 Tab  1 Tab Oral Q12H  
 doxycycline (VIBRA-TABS) tablet 100 mg  100 mg Oral Q12H  potassium chloride (KLOR-CON) packet 20 mEq  20 mEq Oral DAILY  senna-docusate (PERICOLACE) 8.6-50 mg per tablet 1 Tab  1 Tab Oral BID  senna (SENOKOT) tablet 43 mg  5 Tab Oral QHS  ruxolitinib tab 15 mg (Patient Supplied)  15 mg Oral DAILY  aluminum-magnesium hydroxide (MAALOX) oral suspension 30 mL  30 mL Oral QID PRN  promethazine (PHENERGAN) 25 mg in NS IVPB  25 mg IntraVENous Q8H PRN  pantoprazole (PROTONIX) tablet 40 mg  40 mg Oral ACB  oxyCODONE-acetaminophen (PERCOCET) 5-325 mg per tablet 1 Tab  1 Tab Oral Q4H PRN  
 HYDROmorphone (DILAUDID) tablet 1 mg  1 mg Oral Q4H PRN  
 sodium chloride (NS) flush 5-10 mL  5-10 mL IntraVENous PRN  
 sodium chloride (NS) flush 5-10 mL  5-10 mL IntraVENous Q8H  
 sodium chloride (NS) flush 5-10 mL  5-10 mL IntraVENous PRN  
 acetaminophen (TYLENOL) tablet 650 mg  650 mg Oral Q4H PRN  
 ondansetron (ZOFRAN) injection 4 mg  4 mg IntraVENous Q4H PRN  
 0.9% sodium chloride infusion 250 mL  250 mL IntraVENous PRN No Known Allergies Review of Systems: A complete review of systems was obtained, negative except as described above. Physical Exam:  
 
Visit Vitals  /71 (BP 1 Location: Left arm, BP Patient Position: At rest)  Pulse 90  Temp 98.9 °F (37.2 °C)  Resp 18  Ht 5' 6\" (1.676 m)  Wt 64.7 kg (142 lb 9.6 oz)  SpO2 96%  BMI 23.02 kg/m2 ECOG PS: 2 General: No cute distress Eyes: PERRLA, anicteric sclerae HENT: Atraumatic, OP clear Neck: Supple Respiratory: normal respiratory effort CV:  regular rhythm, no murmurs, no peripheral edema GI: Soft, nontender, nondistended, no masses, no hepatomegaly, no splenomegaly MS:  Digits without clubbing or cyanosis. Skin: No rashes, ecchymoses, or petechiae. Normal temperature, turgor, and texture. Psych: Alert, oriented, appropriate affect, normal judgment/insight External genitalia Erythematous vulva. Packing noted Results:  
 
Lab Results Component Value Date/Time WBC 22.9 (H) 07/24/2018 01:08 AM  
 HGB 8.6 (L) 07/24/2018 01:08 AM  
 HCT 28.1 (L) 07/24/2018 01:08 AM  
 PLATELET 96 (L) 62/74/4115 01:08 AM  
 MCV 94.9 07/24/2018 01:08 AM  
 ABS. NEUTROPHILS 11.5 (H) 07/24/2018 01:08 AM  
 
Lab Results Component Value Date/Time Sodium 140 07/23/2018 01:26 AM  
 Potassium 4.0 07/23/2018 01:26 AM  
 Chloride 105 07/23/2018 01:26 AM  
 CO2 27 07/23/2018 01:26 AM  
 Glucose 107 (H) 07/23/2018 01:26 AM  
 BUN 10 07/23/2018 01:26 AM  
 Creatinine 0.85 07/23/2018 01:26 AM  
 GFR est AA >60 07/23/2018 01:26 AM  
 GFR est non-AA >60 07/23/2018 01:26 AM  
 Calcium 8.4 (L) 07/23/2018 01:26 AM  
 
Lab Results Component Value Date/Time Bilirubin, total 0.7 07/23/2018 01:26 AM  
 ALT (SGPT) 38 07/23/2018 01:26 AM  
 AST (SGOT) 39 (H) 07/23/2018 01:26 AM  
 Alk. phosphatase 154 (H) 07/23/2018 01:26 AM  
 Protein, total 6.9 07/23/2018 01:26 AM  
 Albumin 2.7 (L) 07/23/2018 01:26 AM  
 Globulin 4.2 (H) 07/23/2018 01:26 AM  
 
 
7/15/2018 CT PELV W CONT IMPRESSION: No pelvic abscess identified. 
   
7/15/2018 XR CHEST IMPRESSION:  
  
New development of mild to moderate interstitial edema. Right perihilar airspace 
disease which may represent asymmetric pulmonary edema versus pneumonia. Assessment/Plan 1) Fever and darby rectal abscess S/p I & D,  
abx coverage:   Vancomycin and zosyn Surgery planning on debridement of wound; planned for today( 7/24) ; ? Whether will happen today or tomorrow due to pt eating this am 
 
2) Myelofibrosis Follows with heme/onc at [facility] : Dr Candice Worrell S/p BMT in 2013, relapsed and since on Jakafi at 15 mg BID Primary oncologist / Dr Donna Gomez recommending resuming Jakafi at half dose; resumed at 15 mg daily. Will continue. Should not interfere with wound healing as long as wbc does not drop into the leukopenia range. Continue with daily CBC Recommend follow up with primary heme/onc at [facility] upon discharge: informed to make an appointment within a week from discharge 3) Thrombocytopenia Secondary to MF, Jakafi and acute infection Monitor Continues to improve 4) Anemia (s/p 2 units PRBCs) Secondary to MF Hgb stable Minimize transfusions and consider only if Hgb < 7 g/dl 5) Leukocytosis Secondary to MF and acute infection Blasts 5%- can be a result of acute infection. < 10% blasts can be seen with MF and does not indicate leukemic transformation. Blasts improved from what was seen from Dr. Vidal Thornton office Rising leukocytosis may be from myelofibrosis and decreasing the dose of the [facility] Continue to monitor 6) Iron overload Hold Exjade 7) Hypokalemia Received repletion Continue to monitor 8) Dispo [place]  [place] [facility] [place] [place] during hospitalization. CM aware in case home health is needed at discharge. Plan reviewed with Dr Wilder Randhawa.  
 
Signed By: Sharita Ramos NP

## 2018-07-24 NOTE — PROGRESS NOTES
Daily Progress Note: 7/24/2018  Imani Enamorado MD    Assessment/Plan:   Sepsis POA due to below:Fever (7/13/2018)/  Leukocytosis (7/14/2018)/  SIRS (systemic inflammatory response syndrome) ([facility] (7/14/2018): in the setting of immunosuppression.   --broad spectrum abx - pip-tazo   --WBC still up     Rhoan-rectal abscess (7/14/2018): left side. Likely source of fever. IV antibiotics as above. S/p I+D on 7/14; wound cultures pending  --per surgery- will re consult for tomorrow  --pelvic CT did not show pelvic abscess- repeat CT neg  --continue catheter  --To OR today for repeat I&D     Myelofibrosis ([facility] (7/14/2018)/ Anemia/Thrombocytopenia ([facility] (7/14/2018): follows up at [facility]. Hgb up to 8s after 2 units of PRBCs 7/14  --heme following     Hypokalemia (7/14/2018): likely from vomiting. Replete and follow K+     Nausea and vomiting (7/14/2018): likely from infection as above. Hydrate. IV anti-emetics.  Monitor    Constipation  --Stool softeners daily  --Stop Miralax as this has not been effective in the past     Code Status:  Full     Problem List:  Problem List as of 7/24/2018  Date Reviewed: 7/14/2018          Codes Class Noted - Resolved    Myelofibrosis ([facility] ICD-10-CM: D75.81  ICD-9-CM: 289.83  7/14/2018 - Present        Thrombocytopenia ([facility] ICD-10-CM: D69.6  ICD-9-CM: 287.5  7/14/2018 - Present        Hypokalemia ICD-10-CM: E87.6  ICD-9-CM: 276.8  7/14/2018 - Present        Leukocytosis ICD-10-CM: O57.392  ICD-9-CM: 288.60  7/14/2018 - Present        Nausea and vomiting ICD-10-CM: R11.2  ICD-9-CM: 787.01  7/14/2018 - Present        SIRS (systemic inflammatory response syndrome) ([facility] ICD-10-CM: R65.10  ICD-9-CM: 995.90  7/14/2018 - Present        Rhona-rectal abscess ICD-10-CM: K61.1  ICD-9-CM: 624  7/14/2018 - Present        * (Principal)Fever ICD-10-CM: R50.9  ICD-9-CM: 780.60  7/13/2018 - Present              Subjective:   Ms. Aurelio Moore is a 75 y.o. female who is being admitted for Fever. Ms. Waldemar Fuentes presented to our Emergency Department today complaining of intermittent fever and chills associated with generalized weakness. She has also been having nausea and vomiting but denies any headaches, flu like or respiratory symptoms, No abdominal discomfort or diarrhea. She has not has any urinary symptoms. No overt focal symptoms. She does have a Hx of myelofibrosis and follows up at [facility]. Work up thus far has been unremarkable. In light of her immunosuppression, she will be admitted for further management. [Dr Adonis Ying     7/14: Pt found to have perirectal abscess. Afebrile since 5AM.  Still nauseated. NPO for I+D in OR today with Dr Enzo Hernandez. 7/15: continues to be nauseated. Labs pending this AM.  Surgeon doesn't feel it is much better despite I+D yesterday. Pt says pain is better controlled with percocet. 7/16/18: K+ 2.6. Repleting with IV. (6 total) WBC a little better. Tmax 98.8.     7/17:  Pt reports she is feeling somewhat better. Dr Tatianna Ortiz from ([address] [place] [address]) called last night (see note from last night) and wanted Mario 2 inhibitor restarted at 1/2 dose and weaned off from there. WBC remains 15K. K+ back down - replacing. Tmax 101.9. Tnow 98.6. Diuresed about a liter overnight. Checking Mag also. 7/18:  Reports \"starting to feel better. \"  Little pain from surg site. Tmax 99. K+ 3.1 - cont to replace. WBC remains at 15K. LFTs sl up - cont to monitor. 7/19/18: Less pain. Finally had a BM and feels better. WBC still up. Tmax 99.7    7/20:  She reports she feels better but remains very weak. Cont to need IV antibiotics. WBC up to 20K. Tmax 99. K+ normalized. LFTs better. 7/21:  Tmax 99. WBC up again. Will repeat CT of the pelvis as her WBC is higher. She is having less pain. 7/22/18: Feeling pretty well. WBC is still high but repeat CT was neg for abscess. Will ask surgery to see again tomorrow.      7/23/18: Continues to grad feel better and wants to go home today. Will ask surgery to see again and if nothing else needs to be done then home later today. She wants to leave corcoran in place as urine irritates her abscess site. WBC elevated but discussed with Heme-Onc and may be due to the Myelofibrosis but she is to follow up with Dr Jignesh Garcia at [facility] later this wk or early next wk. Will discuss with ID regarding outpt antibiotics. 18: Feeling a bit better today. For OR today for repeat I&D. WBC 22. Review of Systems:   A comprehensive review of systems was negative except for that written in the HPI. Objective:   Physical Exam:     Visit Vitals    /70 (BP 1 Location: Left arm, BP Patient Position: At rest)    Pulse 84    Temp 98.5 °F (36.9 °C)    Resp 18    Ht 5' 6\" (1.676 m)    Wt 142 lb 9.6 oz (64.7 kg)    SpO2 97%    BMI 23.02 kg/m2    O2 Flow Rate (L/min): 2 l/min O2 Device: Room air    Temp (24hrs), Av.6 °F (37 °C), Min:98.3 °F (36.8 °C), Max:98.9 °F (37.2 °C)    1901 -  0700  In: 100 [P.O.:100]  Out: 2000 [Urine:2000]    07 -  1900  In: 1392 [P.O.:240; I.V.:1000]  Out: 2500 [Urine:2500]    General:  Alert, cooperative, no distress, appears stated age   Head:  Normocephalic, without obvious abnormality, atraumatic. Eyes:  Conjunctivae/corneas clear. PERRL, EOMs intact. Nose: Nares normal. Septum midline. Throat: Lips, mucosa, and tongue dry   Neck: Supple, symmetrical, trachea midline, no adenopathy, thyroid: no enlargement/tenderness/nodules, no carotid bruit and no JVD. Back:   Symmetric, no curvature. ROM normal. No CVA tenderness. Lungs:   A few basilar crackles bilaterally. Chest wall:  No tenderness or deformity. Heart:  Regular rate and rhythm, S1, S2 normal, 1/9 ASHA, click, rub or gallop. Abdomen:   Soft, non-tender. Bowel sounds normal. No masses,  No organomegaly. Extremities: Extremities normal, atraumatic, no cyanosis or edema. No calf tenderness or cords. Pulses: 2+ and symmetric all extremities. Skin: Skin color, texture, turgor normal. No rashes or lesions   Neurologic: CNII-XII intact. Alert and oriented X 3. Fine motor of hands and fingers normal.   equal.  Gait not tested at this time. Sensation grossly normal to touch. Gross motor of extremities normal.       Data Review:       Recent Days:  Recent Labs      07/24/18 0108 07/23/18 0126 07/22/18   0300   WBC  22.9*  23.8*  22.5*   HGB  8.6*  9.0*  8.9*   HCT  28.1*  29.3*  28.7*   PLT  96*  99*  91*     Recent Labs      07/24/18 0108 07/23/18 0126 07/22/18   0300   NA   --   140   --    K   --   4.0   --    CL   --   105   --    CO2   --   27   --    GLU   --   107*   --    BUN   --   10   --    CREA   --   0.85   --    CA   --   8.4*   --    MG  2.1  2.1  2.0   ALB   --   2.7*   --    SGOT   --   39*   --    ALT   --   38   --      No results for input(s): PH, PCO2, PO2, HCO3, FIO2 in the last 72 hours. 24 Hour Results:  Recent Results (from the past 24 hour(s))   MAGNESIUM    Collection Time: 07/24/18  1:08 AM   Result Value Ref Range    Magnesium 2.1 1.6 - 2.4 mg/dL   CBC WITH AUTOMATED DIFF    Collection Time: 07/24/18  1:08 AM   Result Value Ref Range    WBC 22.9 (H) 3.6 - 11.0 K/uL    RBC 2.96 (L) 3.80 - 5.20 M/uL    HGB 8.6 (L) 11.5 - 16.0 g/dL    HCT 28.1 (L) 35.0 - 47.0 %    MCV 94.9 80.0 - 99.0 FL    MCH 29.1 26.0 - 34.0 PG    MCHC 30.6 30.0 - 36.5 g/dL    RDW 18.6 (H) 11.5 - 14.5 %    PLATELET 96 (L) 777 - 400 K/uL    MPV 11.8 8.9 - 12.9 FL    NRBC 7.2 (H) 0  WBC    ABSOLUTE NRBC 1.64 (H) 0.00 - 0.01 K/uL    NEUTROPHILS 43 32 - 75 %    BAND NEUTROPHILS 7 (H) 0 - 6 %    LYMPHOCYTES 11 (L) 12 - 49 %    MONOCYTES 30 (H) 5 - 13 %    EOSINOPHILS 0 0 - 7 %    BASOPHILS 1 0 - 1 %    METAMYELOCYTES 6 (H) 0 %    PROMYELOCYTES 2 (H) 0 %    IMMATURE GRANULOCYTES 0 %    ABS. NEUTROPHILS 11.5 (H) 1.8 - 8.0 K/UL    ABS. LYMPHOCYTES 2.5 0.8 - 3.5 K/UL    ABS.  MONOCYTES 6.9 (H) 0.0 - 1.0 K/UL    ABS. EOSINOPHILS 0.0 0.0 - 0.4 K/UL    ABS. BASOPHILS 0.2 (H) 0.0 - 0.1 K/UL    ABS. IMM. GRANS. 0.0 K/UL    DF MANUAL      RBC COMMENTS ANISOCYTOSIS  1+        RBC COMMENTS STOMATOCYTES  1+        RBC COMMENTS POLYCHROMASIA  PRESENT           Medications reviewed  Current Facility-Administered Medications   Medication Dose Route Frequency    amoxicillin-clavulanate (AUGMENTIN) 875-125 mg per tablet 1 Tab  1 Tab Oral Q12H    doxycycline (VIBRA-TABS) tablet 100 mg  100 mg Oral Q12H    potassium chloride (KLOR-CON) packet 20 mEq  20 mEq Oral DAILY    senna-docusate (PERICOLACE) 8.6-50 mg per tablet 1 Tab  1 Tab Oral BID    senna (SENOKOT) tablet 43 mg  5 Tab Oral QHS    ruxolitinib tab 15 mg (Patient Supplied)  15 mg Oral DAILY    aluminum-magnesium hydroxide (MAALOX) oral suspension 30 mL  30 mL Oral QID PRN    promethazine (PHENERGAN) 25 mg in NS IVPB  25 mg IntraVENous Q8H PRN    pantoprazole (PROTONIX) tablet 40 mg  40 mg Oral ACB    oxyCODONE-acetaminophen (PERCOCET) 5-325 mg per tablet 1 Tab  1 Tab Oral Q4H PRN    HYDROmorphone (DILAUDID) tablet 1 mg  1 mg Oral Q4H PRN    sodium chloride (NS) flush 5-10 mL  5-10 mL IntraVENous PRN    sodium chloride (NS) flush 5-10 mL  5-10 mL IntraVENous Q8H    sodium chloride (NS) flush 5-10 mL  5-10 mL IntraVENous PRN    acetaminophen (TYLENOL) tablet 650 mg  650 mg Oral Q4H PRN    ondansetron (ZOFRAN) injection 4 mg  4 mg IntraVENous Q4H PRN    0.9% sodium chloride infusion 250 mL  250 mL IntraVENous PRN     Care Plan discussed with: Patien/Heme-Onc and Nurse  Total time spent with patient: 30 minutes.     Iain Delvalle MD

## 2018-07-24 NOTE — PROGRESS NOTES
SURGERY PROGRESS NOTE      Admit Date: 2018    POD 10 Days Post-Op    Procedure: Procedure(s):  INCISION AND DRAINAGE, EXCISIONAL DEBRIDEMENT OF PERIRECTAL ABSCESS      Subjective:     Patient at [facility] this am despite NPO order. Surgery canceled because I have office hours starting at 1pm and anesthesia request surgery be after 1pm.  Will reschedule for tomorrow     Objective:     Visit Vitals    /71 (BP 1 Location: Left arm, BP Patient Position: At rest)    Pulse 90    Temp 98.9 °F (37.2 °C)    Resp 18    Ht 5' 6\" (1.676 m)    Wt 142 lb 9.6 oz (64.7 kg)    SpO2 96%    BMI 23.02 kg/m2        Temp (24hrs), Av.8 °F (37.1 °C), Min:98.3 °F (36.8 °C), Max:99.1 °F (37.3 °C)      701 - 1900  In: -   Out: 900 [Urine:900]  1901 -  07  In: 0678 [P.O.:340; I.V.:1000]  Out: 7626 [Urine:3750]    Physical Exam:    General:  alert, cooperative, no distress, appears stated age   Abdomen: soft, bowel sounds active, non-tender   Wound :   dressing C/D/I           Lab Results  Component Value Date/Time   WBC 22.9 (H) 2018 01:08 AM   HGB 8.6 (L) 2018 01:08 AM   HCT 28.1 (L) 2018 01:08 AM   PLATELET 96 (L)  01:08 AM   MCV 94.9 2018 01:08 AM       Assessment:     Principal Problem:    Fever (2018)    Active Problems:    Myelofibrosis ([address] (2018)      Thrombocytopenia (H[facility]) (2018)      Hypokalemia (2018)      Leukocytosis (2018)      Nausea and vomiting (2018)      SIRS (systemic inflammatory response syndrome) ([address] (2018)      Rhona-rectal abscess (2018)      Patient with dry gangrene of gluteal soft tissue. Would benefit from debridement.   Discussed the importance of NPO states and the dynamic nature of the OR schedule    Plan:       Hopefully debridement in OR tomorrow

## 2018-07-24 NOTE — PROGRESS NOTES
Bedside and Verbal shift change report given to Reta Castano (oncoming nurse) by Deedee Osei (offgoing nurse). Report included the following information SBAR, Kardex and Recent Results.

## 2018-07-24 NOTE — PERIOP NOTES
I called and spoke to patient. Pt states she had 2 ajit crackers and 4ox oj at 7 am.  Dr. Charles Bashir states pt needs to wait until 1pm today to do pt sx. Dr. Mirella Sapp states he has office hours at 1pm today and cant do this sx today. I notified pt and pt nurse Williams. In addition the blood bank states that pt has antibodies in blood and they need us to send a type and screen to blood bank which they will send to [facility]. Williams states she will draw the lab work. Dr. Mirella Sapp will talk to OR to see when to put pt back on the OR schedule.

## 2018-07-24 NOTE — PROGRESS NOTES
paged by RN to provide support to pt on Post Surg unit. Pt politely dismissed  by sharing that she had already been seen by a  and did not have any needs at this time.  acknowledged and affirmed her for being honest. Reminded her of availability and assured her of continued support if needed/ desired. Allison Cuba., M.S.   Spiritual Care Department  If needs rise please call Yolanda-HUMBLE ([phone])

## 2018-07-24 NOTE — PROGRESS NOTES
0100- patient resting in bed- AxO with no complaints. Changed guaze at labia as it was saturated with yellow drainage. No complaints of pain. Patient states that she would like to have orange juice and ajit crackers this am before she is NPO at 0700.

## 2018-07-24 NOTE — PROGRESS NOTES
7- CASE MANAGEMENT NOTE:  I spoke with Jigar Couch at [facility] [address] [facility] [address] ([phone]) to inform her that the pt was scheduled for an I&D of her wound today and that has now been postponed until at least tomorrow. I met with the pt and reminded her that she will need someone in the home to be taught the wound care and she said her daughter-in-law and  (retired dentist) will be available. When she is stable for discharge [facility] [address] in [place] ([phone]) will need to be notified and orders, discharge instructions, etc. Faxed to them at [phone]. CM will continue to follow.  HARRY Hairston, CM

## 2018-07-25 ENCOUNTER — ANESTHESIA (OUTPATIENT)
Dept: SURGERY | Age: 73
DRG: 854 | End: 2018-07-25
Payer: MEDICARE

## 2018-07-25 LAB
BASOPHILS # BLD: 0 K/UL (ref 0–0.1)
BASOPHILS NFR BLD: 0 % (ref 0–1)
DIFFERENTIAL METHOD BLD: ABNORMAL
EOSINOPHIL # BLD: 0 K/UL (ref 0–0.4)
EOSINOPHIL NFR BLD: 0 % (ref 0–7)
ERYTHROCYTE [DISTWIDTH] IN BLOOD BY AUTOMATED COUNT: 18.8 % (ref 11.5–14.5)
HCT VFR BLD AUTO: 29.7 % (ref 35–47)
HGB BLD-MCNC: 9.2 G/DL (ref 11.5–16)
IMM GRANULOCYTES # BLD: 0 K/UL
IMM GRANULOCYTES NFR BLD AUTO: 0 %
LYMPHOCYTES # BLD: 2.1 K/UL (ref 0.8–3.5)
LYMPHOCYTES NFR BLD: 9 % (ref 12–49)
MAGNESIUM SERPL-MCNC: 2.1 MG/DL (ref 1.6–2.4)
MCH RBC QN AUTO: 29.7 PG (ref 26–34)
MCHC RBC AUTO-ENTMCNC: 31 G/DL (ref 30–36.5)
MCV RBC AUTO: 95.8 FL (ref 80–99)
METAMYELOCYTES NFR BLD MANUAL: 2 %
MONOCYTES # BLD: 6.1 K/UL (ref 0–1)
MONOCYTES NFR BLD: 26 % (ref 5–13)
MYELOCYTES NFR BLD MANUAL: 1 %
NEUTS BAND NFR BLD MANUAL: 1 % (ref 0–6)
NEUTS SEG # BLD: 14.6 K/UL (ref 1.8–8)
NEUTS SEG NFR BLD: 61 % (ref 32–75)
NRBC # BLD: 1.88 K/UL (ref 0–0.01)
NRBC BLD-RTO: 8 PER 100 WBC
PLATELET # BLD AUTO: 116 K/UL (ref 150–400)
PMV BLD AUTO: 13.3 FL (ref 8.9–12.9)
RBC # BLD AUTO: 3.1 M/UL (ref 3.8–5.2)
RBC MORPH BLD: ABNORMAL
RBC MORPH BLD: ABNORMAL
WBC # BLD AUTO: 23.6 K/UL (ref 3.6–11)

## 2018-07-25 PROCEDURE — 83735 ASSAY OF MAGNESIUM: CPT | Performed by: FAMILY MEDICINE

## 2018-07-25 PROCEDURE — 76060000032 HC ANESTHESIA 0.5 TO 1 HR: Performed by: SURGERY

## 2018-07-25 PROCEDURE — 77030031139 HC SUT VCRL2 J&J -A: Performed by: SURGERY

## 2018-07-25 PROCEDURE — 74011250636 HC RX REV CODE- 250/636

## 2018-07-25 PROCEDURE — 77030019908 HC STETH ESOPH SIMS -A: Performed by: NURSE ANESTHETIST, CERTIFIED REGISTERED

## 2018-07-25 PROCEDURE — 74011000250 HC RX REV CODE- 250: Performed by: SURGERY

## 2018-07-25 PROCEDURE — 0JB90ZZ EXCISION OF BUTTOCK SUBCUTANEOUS TISSUE AND FASCIA, OPEN APPROACH: ICD-10-PCS | Performed by: SURGERY

## 2018-07-25 PROCEDURE — 77030011640 HC PAD GRND REM COVD -A: Performed by: SURGERY

## 2018-07-25 PROCEDURE — 85025 COMPLETE CBC W/AUTO DIFF WBC: CPT | Performed by: FAMILY MEDICINE

## 2018-07-25 PROCEDURE — 94760 N-INVAS EAR/PLS OXIMETRY 1: CPT

## 2018-07-25 PROCEDURE — 77030032490 HC SLV COMPR SCD KNE COVD -B: Performed by: SURGERY

## 2018-07-25 PROCEDURE — 74011000250 HC RX REV CODE- 250

## 2018-07-25 PROCEDURE — 77030008684 HC TU ET CUF COVD -B: Performed by: NURSE ANESTHETIST, CERTIFIED REGISTERED

## 2018-07-25 PROCEDURE — 74011250636 HC RX REV CODE- 250/636: Performed by: INTERNAL MEDICINE

## 2018-07-25 PROCEDURE — 74011000272 HC RX REV CODE- 272: Performed by: SURGERY

## 2018-07-25 PROCEDURE — 74011250636 HC RX REV CODE- 250/636: Performed by: ANESTHESIOLOGY

## 2018-07-25 PROCEDURE — 74011250637 HC RX REV CODE- 250/637: Performed by: FAMILY MEDICINE

## 2018-07-25 PROCEDURE — 36415 COLL VENOUS BLD VENIPUNCTURE: CPT | Performed by: FAMILY MEDICINE

## 2018-07-25 PROCEDURE — 77030020256 HC SOL INJ NACL 0.9%  500ML: Performed by: SURGERY

## 2018-07-25 PROCEDURE — 76210000006 HC OR PH I REC 0.5 TO 1 HR: Performed by: SURGERY

## 2018-07-25 PROCEDURE — 77030015696: Performed by: SURGERY

## 2018-07-25 PROCEDURE — 74011250637 HC RX REV CODE- 250/637: Performed by: INTERNAL MEDICINE

## 2018-07-25 PROCEDURE — 88304 TISSUE EXAM BY PATHOLOGIST: CPT | Performed by: SURGERY

## 2018-07-25 PROCEDURE — 76010000138 HC OR TIME 0.5 TO 1 HR: Performed by: SURGERY

## 2018-07-25 PROCEDURE — 94762 N-INVAS EAR/PLS OXIMTRY CONT: CPT

## 2018-07-25 PROCEDURE — 77030020782 HC GWN BAIR PAWS FLX 3M -B

## 2018-07-25 PROCEDURE — 77030018673: Performed by: SURGERY

## 2018-07-25 PROCEDURE — 74011250637 HC RX REV CODE- 250/637: Performed by: SURGERY

## 2018-07-25 PROCEDURE — 77030013708 HC HNDPC SUC IRR PULS STRY –B: Performed by: SURGERY

## 2018-07-25 PROCEDURE — 65270000029 HC RM PRIVATE

## 2018-07-25 PROCEDURE — 77030026438 HC STYL ET INTUB CARD -A: Performed by: ANESTHESIOLOGY

## 2018-07-25 RX ORDER — LIDOCAINE HYDROCHLORIDE 10 MG/ML
0.1 INJECTION, SOLUTION EPIDURAL; INFILTRATION; INTRACAUDAL; PERINEURAL AS NEEDED
Status: DISCONTINUED | OUTPATIENT
Start: 2018-07-25 | End: 2018-07-31 | Stop reason: HOSPADM

## 2018-07-25 RX ORDER — SODIUM CHLORIDE 0.9 % (FLUSH) 0.9 %
5-10 SYRINGE (ML) INJECTION EVERY 8 HOURS
Status: DISCONTINUED | OUTPATIENT
Start: 2018-07-25 | End: 2018-07-30

## 2018-07-25 RX ORDER — FLUMAZENIL 0.1 MG/ML
0.2 INJECTION INTRAVENOUS
Status: DISCONTINUED | OUTPATIENT
Start: 2018-07-25 | End: 2018-07-31 | Stop reason: HOSPADM

## 2018-07-25 RX ORDER — HYDROMORPHONE HYDROCHLORIDE 2 MG/ML
.25-1 INJECTION, SOLUTION INTRAMUSCULAR; INTRAVENOUS; SUBCUTANEOUS
Status: DISCONTINUED | OUTPATIENT
Start: 2018-07-25 | End: 2018-07-25 | Stop reason: HOSPADM

## 2018-07-25 RX ORDER — SODIUM CHLORIDE, SODIUM LACTATE, POTASSIUM CHLORIDE, CALCIUM CHLORIDE 600; 310; 30; 20 MG/100ML; MG/100ML; MG/100ML; MG/100ML
100 INJECTION, SOLUTION INTRAVENOUS CONTINUOUS
Status: DISCONTINUED | OUTPATIENT
Start: 2018-07-25 | End: 2018-07-25

## 2018-07-25 RX ORDER — SODIUM CHLORIDE 0.9 % (FLUSH) 0.9 %
5-10 SYRINGE (ML) INJECTION AS NEEDED
Status: DISCONTINUED | OUTPATIENT
Start: 2018-07-25 | End: 2018-07-25 | Stop reason: HOSPADM

## 2018-07-25 RX ORDER — LIDOCAINE HYDROCHLORIDE 20 MG/ML
INJECTION, SOLUTION EPIDURAL; INFILTRATION; INTRACAUDAL; PERINEURAL AS NEEDED
Status: DISCONTINUED | OUTPATIENT
Start: 2018-07-25 | End: 2018-07-25 | Stop reason: HOSPADM

## 2018-07-25 RX ORDER — SODIUM CHLORIDE 0.9 % (FLUSH) 0.9 %
5-10 SYRINGE (ML) INJECTION AS NEEDED
Status: DISCONTINUED | OUTPATIENT
Start: 2018-07-25 | End: 2018-07-30

## 2018-07-25 RX ORDER — PHENYLEPHRINE HCL IN 0.9% NACL 0.4MG/10ML
SYRINGE (ML) INTRAVENOUS AS NEEDED
Status: DISCONTINUED | OUTPATIENT
Start: 2018-07-25 | End: 2018-07-25 | Stop reason: HOSPADM

## 2018-07-25 RX ORDER — SODIUM CHLORIDE 0.9 % (FLUSH) 0.9 %
5-10 SYRINGE (ML) INJECTION EVERY 8 HOURS
Status: DISCONTINUED | OUTPATIENT
Start: 2018-07-25 | End: 2018-07-25

## 2018-07-25 RX ORDER — SODIUM CHLORIDE, SODIUM LACTATE, POTASSIUM CHLORIDE, CALCIUM CHLORIDE 600; 310; 30; 20 MG/100ML; MG/100ML; MG/100ML; MG/100ML
125 INJECTION, SOLUTION INTRAVENOUS CONTINUOUS
Status: DISCONTINUED | OUTPATIENT
Start: 2018-07-25 | End: 2018-07-25 | Stop reason: HOSPADM

## 2018-07-25 RX ORDER — SUCCINYLCHOLINE CHLORIDE 20 MG/ML
INJECTION INTRAMUSCULAR; INTRAVENOUS AS NEEDED
Status: DISCONTINUED | OUTPATIENT
Start: 2018-07-25 | End: 2018-07-25 | Stop reason: HOSPADM

## 2018-07-25 RX ORDER — ROCURONIUM BROMIDE 10 MG/ML
INJECTION, SOLUTION INTRAVENOUS AS NEEDED
Status: DISCONTINUED | OUTPATIENT
Start: 2018-07-25 | End: 2018-07-25 | Stop reason: HOSPADM

## 2018-07-25 RX ORDER — ONDANSETRON 2 MG/ML
INJECTION INTRAMUSCULAR; INTRAVENOUS AS NEEDED
Status: DISCONTINUED | OUTPATIENT
Start: 2018-07-25 | End: 2018-07-25 | Stop reason: HOSPADM

## 2018-07-25 RX ORDER — SODIUM CHLORIDE, SODIUM LACTATE, POTASSIUM CHLORIDE, CALCIUM CHLORIDE 600; 310; 30; 20 MG/100ML; MG/100ML; MG/100ML; MG/100ML
100 INJECTION, SOLUTION INTRAVENOUS CONTINUOUS
Status: DISPENSED | OUTPATIENT
Start: 2018-07-25 | End: 2018-07-26

## 2018-07-25 RX ORDER — HYDROMORPHONE HYDROCHLORIDE 2 MG/ML
.25-1 INJECTION, SOLUTION INTRAMUSCULAR; INTRAVENOUS; SUBCUTANEOUS
Status: DISCONTINUED | OUTPATIENT
Start: 2018-07-25 | End: 2018-07-25

## 2018-07-25 RX ORDER — SODIUM CHLORIDE, SODIUM LACTATE, POTASSIUM CHLORIDE, CALCIUM CHLORIDE 600; 310; 30; 20 MG/100ML; MG/100ML; MG/100ML; MG/100ML
125 INJECTION, SOLUTION INTRAVENOUS CONTINUOUS
Status: DISPENSED | OUTPATIENT
Start: 2018-07-25 | End: 2018-07-26

## 2018-07-25 RX ORDER — DIPHENHYDRAMINE HYDROCHLORIDE 50 MG/ML
12.5 INJECTION, SOLUTION INTRAMUSCULAR; INTRAVENOUS AS NEEDED
Status: DISCONTINUED | OUTPATIENT
Start: 2018-07-25 | End: 2018-07-25 | Stop reason: HOSPADM

## 2018-07-25 RX ORDER — NALOXONE HYDROCHLORIDE 0.4 MG/ML
0.04 INJECTION, SOLUTION INTRAMUSCULAR; INTRAVENOUS; SUBCUTANEOUS
Status: DISCONTINUED | OUTPATIENT
Start: 2018-07-25 | End: 2018-07-31 | Stop reason: HOSPADM

## 2018-07-25 RX ORDER — DEXAMETHASONE SODIUM PHOSPHATE 4 MG/ML
INJECTION, SOLUTION INTRA-ARTICULAR; INTRALESIONAL; INTRAMUSCULAR; INTRAVENOUS; SOFT TISSUE AS NEEDED
Status: DISCONTINUED | OUTPATIENT
Start: 2018-07-25 | End: 2018-07-25 | Stop reason: HOSPADM

## 2018-07-25 RX ORDER — FENTANYL CITRATE 50 UG/ML
INJECTION, SOLUTION INTRAMUSCULAR; INTRAVENOUS AS NEEDED
Status: DISCONTINUED | OUTPATIENT
Start: 2018-07-25 | End: 2018-07-25 | Stop reason: HOSPADM

## 2018-07-25 RX ORDER — MIDAZOLAM HYDROCHLORIDE 1 MG/ML
INJECTION, SOLUTION INTRAMUSCULAR; INTRAVENOUS AS NEEDED
Status: DISCONTINUED | OUTPATIENT
Start: 2018-07-25 | End: 2018-07-25 | Stop reason: HOSPADM

## 2018-07-25 RX ORDER — PROPOFOL 10 MG/ML
INJECTION, EMULSION INTRAVENOUS AS NEEDED
Status: DISCONTINUED | OUTPATIENT
Start: 2018-07-25 | End: 2018-07-25 | Stop reason: HOSPADM

## 2018-07-25 RX ADMIN — FENTANYL CITRATE 75 MCG: 50 INJECTION, SOLUTION INTRAMUSCULAR; INTRAVENOUS at 19:29

## 2018-07-25 RX ADMIN — FENTANYL CITRATE 25 MCG: 50 INJECTION, SOLUTION INTRAMUSCULAR; INTRAVENOUS at 19:50

## 2018-07-25 RX ADMIN — Medication 40 MCG: at 19:26

## 2018-07-25 RX ADMIN — ONDANSETRON 4 MG: 2 INJECTION INTRAMUSCULAR; INTRAVENOUS at 19:22

## 2018-07-25 RX ADMIN — HYDROMORPHONE HYDROCHLORIDE 1 MG: 2 TABLET ORAL at 22:37

## 2018-07-25 RX ADMIN — HYDROMORPHONE HYDROCHLORIDE 1 MG: 2 INJECTION, SOLUTION INTRAMUSCULAR; INTRAVENOUS; SUBCUTANEOUS at 20:16

## 2018-07-25 RX ADMIN — SUCCINYLCHOLINE CHLORIDE 100 MG: 20 INJECTION INTRAMUSCULAR; INTRAVENOUS at 19:14

## 2018-07-25 RX ADMIN — SENNOSIDES AND DOCUSATE SODIUM 1 TABLET: 8.6; 5 TABLET ORAL at 22:23

## 2018-07-25 RX ADMIN — PROPOFOL 160 MG: 10 INJECTION, EMULSION INTRAVENOUS at 19:13

## 2018-07-25 RX ADMIN — LIDOCAINE HYDROCHLORIDE 40 MG: 20 INJECTION, SOLUTION EPIDURAL; INFILTRATION; INTRACAUDAL; PERINEURAL at 19:11

## 2018-07-25 RX ADMIN — FENTANYL CITRATE 25 MCG: 50 INJECTION, SOLUTION INTRAMUSCULAR; INTRAVENOUS at 19:08

## 2018-07-25 RX ADMIN — DOXYCYCLINE HYCLATE 100 MG: 100 TABLET, COATED ORAL at 22:23

## 2018-07-25 RX ADMIN — ONDANSETRON 4 MG: 2 INJECTION INTRAMUSCULAR; INTRAVENOUS at 20:52

## 2018-07-25 RX ADMIN — ROCURONIUM BROMIDE 5 MG: 10 INJECTION, SOLUTION INTRAVENOUS at 19:11

## 2018-07-25 RX ADMIN — FENTANYL CITRATE 50 MCG: 50 INJECTION, SOLUTION INTRAMUSCULAR; INTRAVENOUS at 20:04

## 2018-07-25 RX ADMIN — MIDAZOLAM HYDROCHLORIDE 2 MG: 1 INJECTION, SOLUTION INTRAMUSCULAR; INTRAVENOUS at 19:04

## 2018-07-25 RX ADMIN — Medication 40 MCG: at 19:37

## 2018-07-25 RX ADMIN — ACETAMINOPHEN 650 MG: 325 TABLET ORAL at 00:10

## 2018-07-25 RX ADMIN — FENTANYL CITRATE 25 MCG: 50 INJECTION, SOLUTION INTRAMUSCULAR; INTRAVENOUS at 19:20

## 2018-07-25 RX ADMIN — DEXAMETHASONE SODIUM PHOSPHATE 8 MG: 4 INJECTION, SOLUTION INTRA-ARTICULAR; INTRALESIONAL; INTRAMUSCULAR; INTRAVENOUS; SOFT TISSUE at 19:22

## 2018-07-25 RX ADMIN — SODIUM CHLORIDE, SODIUM LACTATE, POTASSIUM CHLORIDE, AND CALCIUM CHLORIDE 125 ML/HR: 600; 310; 30; 20 INJECTION, SOLUTION INTRAVENOUS at 16:49

## 2018-07-25 RX ADMIN — AMOXICILLIN AND CLAVULANATE POTASSIUM 1 TABLET: 875; 125 TABLET, FILM COATED ORAL at 22:22

## 2018-07-25 RX ADMIN — FENTANYL CITRATE 50 MCG: 50 INJECTION, SOLUTION INTRAMUSCULAR; INTRAVENOUS at 19:13

## 2018-07-25 RX ADMIN — SODIUM CHLORIDE, POTASSIUM CHLORIDE, SODIUM LACTATE AND CALCIUM CHLORIDE 100 ML/HR: 600; 310; 30; 20 INJECTION, SOLUTION INTRAVENOUS at 19:00

## 2018-07-25 NOTE — PROGRESS NOTES
[facility] at [address], [address] [place] W: [phone]  F: [phone] Reason for Visit:  
Sudha Sigala is a 75 y.o. female who is seen in consultation at the request of Dr. Dc Gee for evaluation of Myelofibrosis. History of Present Illness:  
Patient is a 75 y. o. with PMF who is admitted on 7/14/18 with c/o weakness, N/V and intermittent fevers x 5 days. She has a known h/o UTIs. Noted to have anemia, thrombocytopenia and leucocytosis on admission. CXR and UA unremarkable. She was started on Levaquin and Vancomycin and underwent I & D for a perirectal abscess on 7/14/18. She states that she underwent a BMT in 2013 for MF but relapsed soon after. Has since been on Jakafi and follows with Hematology at [facility]. She has also been on Exjade for transfusion iron overload. Does not think she has an enlarged spleen. She relocated from [facility] last year but was in the process of moving back later in this week. In the last 3-4 days she noted weakness, confusion, intermittent fevers and urinary incontinence. 1 Day prior to presentation she thought she felt a swelling in her rectal area. Hence she presented to the ER when she was found to have a temp of 102 F Since the I and D she has had a Tmax of 100F. Has better alertness, still has pain in the perirectal area, has no chills or bleeding. Notes some nausea x 1 day. No CP, SOB, Cough, dysuria. Has not had a BM since 3 days but has no abdominal discomfort Interval History:  
Awaiting procedure for today; no complaints at present. No family at bedside. Aware she needs to have close follow up with primary oncologist. Will make appt for later this week. Follows with Dr Brandyn Leonard/oncologist at [facility].([phone]); I have spoken with her Past Medical History:  
Diagnosis Date  Myelofibrosis ([address] BMT in 2013 for MF but relapsed soon after.  Has since been on Jakafi and follows with Hematology at [facility] Past Surgical History:  
Procedure Laterality Date  HX BONE MARROW TRANSPLANT  HX VASCULAR ACCESS    
 right portacath Social History Substance Use Topics  Smoking status: Never Smoker  Smokeless tobacco: Never Used  Alcohol use Yes Comment: seldom Family History Problem Relation Age of Onset  Heart Attack Mother Current Facility-Administered Medications Medication Dose Route Frequency  potassium chloride SR (KLOR-CON 10) tablet 20 mEq  20 mEq Oral BID  amoxicillin-clavulanate (AUGMENTIN) 875-125 mg per tablet 1 Tab  1 Tab Oral Q12H  
 doxycycline (VIBRA-TABS) tablet 100 mg  100 mg Oral Q12H  
 senna-docusate (PERICOLACE) 8.6-50 mg per tablet 1 Tab  1 Tab Oral BID  senna (SENOKOT) tablet 43 mg  5 Tab Oral QHS  ruxolitinib tab 15 mg  (Patient Supplied)  15 mg Oral DAILY  aluminum-magnesium hydroxide (MAALOX) oral suspension 30 mL  30 mL Oral QID PRN  promethazine (PHENERGAN) 25 mg in NS IVPB  25 mg IntraVENous Q8H PRN  pantoprazole (PROTONIX) tablet 40 mg  40 mg Oral ACB  oxyCODONE-acetaminophen (PERCOCET) 5-325 mg per tablet 1 Tab  1 Tab Oral Q4H PRN  
 HYDROmorphone (DILAUDID) tablet 1 mg  1 mg Oral Q4H PRN  
 sodium chloride (NS) flush 5-10 mL  5-10 mL IntraVENous PRN  
 sodium chloride (NS) flush 5-10 mL  5-10 mL IntraVENous Q8H  
 sodium chloride (NS) flush 5-10 mL  5-10 mL IntraVENous PRN  
 acetaminophen (TYLENOL) tablet 650 mg  650 mg Oral Q4H PRN  
 ondansetron (ZOFRAN) injection 4 mg  4 mg IntraVENous Q4H PRN  
 0.9% sodium chloride infusion 250 mL  250 mL IntraVENous PRN No Known Allergies Review of Systems: A complete review of systems was obtained, negative except as described above. Physical Exam:  
 
Visit Vitals  /76 (BP 1 Location: Right arm, BP Patient Position: At rest)  Pulse 91  Temp 98.9 °F (37.2 °C)  Resp 18  Ht 5' 6\" (1.676 m)  Wt 64.7 kg (142 lb 9.6 oz)  SpO2 95%  BMI 23.02 kg/m2 ECOG PS: 2 General: No cute distress Eyes: PERRLA, anicteric sclerae HENT: Atraumatic, OP clear Neck: Supple Respiratory: normal respiratory effort CV:  regular rhythm, no murmurs, no peripheral edema GI: Soft, nontender, nondistended, no masses, no hepatomegaly, no splenomegaly MS:  Digits without clubbing or cyanosis. Skin: No rashes, ecchymoses, or petechiae. Normal temperature, turgor, and texture. Psych: Alert, oriented, appropriate affect, normal judgment/insight External genitalia Erythematous vulva. Packing noted Results:  
 
Lab Results Component Value Date/Time WBC 23.6 (H) 07/25/2018 03:15 AM  
 HGB 9.2 (L) 07/25/2018 03:15 AM  
 HCT 29.7 (L) 07/25/2018 03:15 AM  
 PLATELET 552 (L) 94/61/0438 03:15 AM  
 MCV 95.8 07/25/2018 03:15 AM  
 ABS. NEUTROPHILS 14.6 (H) 07/25/2018 03:15 AM  
 
Lab Results Component Value Date/Time Sodium 140 07/23/2018 01:26 AM  
 Potassium 4.0 07/23/2018 01:26 AM  
 Chloride 105 07/23/2018 01:26 AM  
 CO2 27 07/23/2018 01:26 AM  
 Glucose 107 (H) 07/23/2018 01:26 AM  
 BUN 10 07/23/2018 01:26 AM  
 Creatinine 0.85 07/23/2018 01:26 AM  
 GFR est AA >60 07/23/2018 01:26 AM  
 GFR est non-AA >60 07/23/2018 01:26 AM  
 Calcium 8.4 (L) 07/23/2018 01:26 AM  
 
Lab Results Component Value Date/Time Bilirubin, total 0.7 07/23/2018 01:26 AM  
 ALT (SGPT) 38 07/23/2018 01:26 AM  
 AST (SGOT) 39 (H) 07/23/2018 01:26 AM  
 Alk. phosphatase 154 (H) 07/23/2018 01:26 AM  
 Protein, total 6.9 07/23/2018 01:26 AM  
 Albumin 2.7 (L) 07/23/2018 01:26 AM  
 Globulin 4.2 (H) 07/23/2018 01:26 AM  
 
 
7/15/2018 CT PELV W CONT IMPRESSION: No pelvic abscess identified. 
   
7/15/2018 XR CHEST IMPRESSION:  
  
New development of mild to moderate interstitial edema. Right perihilar airspace 
disease which may represent asymmetric pulmonary edema versus pneumonia. Assessment/Plan 1) Fever and darby rectal abscess S/p I & D,  
abx coverage:   Vancomycin and zosyn Surgery planning on debridement of wound; planned for today( 7/25) 2) Myelofibrosis Follows with heme/onc at [facility] : Dr Bhavesh Santana S/p BMT in 2013, relapsed and since on Jakafi at 15 mg BID Primary oncologist / Dr Moises Massey recommending resuming Jakafi at half dose; resumed at 15 mg daily. Will continue. Should not interfere with wound healing as long as wbc does not drop into the leukopenia range. Continue with daily CBC Recommend follow up with primary heme/onc at [facility] upon discharge: informed to make an appointment within a week from discharge 3) Thrombocytopenia Secondary to MF, Jakafi and acute infection Monitor Continues to improve 4) Anemia (s/p 2 units PRBCs) Secondary to MF Hgb stable Minimize transfusions and consider only if Hgb < 7 g/dl 5) Leukocytosis Secondary to MF and acute infection Blasts 5%- can be a result of acute infection. < 10% blasts can be seen with MF and does not indicate leukemic transformation. Blasts improved from what was seen from Dr. Jessa Dallas office Rising leukocytosis may be from myelofibrosis and decreasing the dose of the [facility] EVI and possible need for debridement of rectal area abscess Continue to monitor 6) Iron overload Hold Exjade 7) Hypokalemia Received repletion Continue to monitor 8) Dispo Has moved  to a new home in [facility]; [facility]. during hospitalization. CM aware in case home health is needed at discharge. Plan reviewed with Dr Shana Conway.  
 
Signed By: Krishna Son NP

## 2018-07-25 NOTE — PROGRESS NOTES
Bedside and Verbal shift change report given to Odalis (oncoming nurse) by Torie Marinelli (offgoing nurse). Report included the following information SBAR, Kardex, OR Summary, MAR and Recent Results.

## 2018-07-25 NOTE — PERIOP NOTES
Patient Fall Protocol  Yellow arm band applied to patient and yellow non skid socks placed on  Bed in low position, all side rails up, call bell in reach  Pt and Family instructed in \"call- don't fall\" protocol   -use your call bell, wait for assistance, staff not family will assist you to get up and move about   Pt and family verbalize understanding of fall precautions and the \"call don't fall\" Protocol

## 2018-07-25 NOTE — PROGRESS NOTES
Problem: Falls - Risk of  Goal: *Absence of Falls  Document Marcus Fall Risk and appropriate interventions in the flowsheet. Outcome: Progressing Towards Goal  Fall Risk Interventions:  Mobility Interventions: Bed/chair exit alarm, Communicate number of staff needed for ambulation/transfer, Patient to call before getting OOB    Mentation Interventions: Adequate sleep, hydration, pain control, Bed/chair exit alarm    Medication Interventions: Patient to call before getting OOB    Elimination Interventions: Bed/chair exit alarm, Call light in reach, Patient to call for help with toileting needs    History of Falls Interventions: Consult care management for discharge planning        Problem: Pressure Injury - Risk of  Goal: *Prevention of pressure injury  Document Bruce Scale and appropriate interventions in the flowsheet. Outcome: Progressing Towards Goal  Pressure Injury Interventions:       Moisture Interventions: Absorbent underpads    Activity Interventions: Pressure redistribution bed/mattress(bed type)    Mobility Interventions: HOB 30 degrees or less, Turn and reposition approx.  every two hours(pillow and wedges)    Nutrition Interventions: Document food/fluid/supplement intake    Friction and Shear Interventions: Apply protective barrier, creams and emollients, HOB 30 degrees or less, Transfer aides:transfer board/Alexandr lift/ceiling lift

## 2018-07-25 NOTE — PROGRESS NOTES
Daily Progress Note: 7/25/2018  Rod Davis MD    Assessment/Plan:   Sepsis POA due to below:Fever (7/13/2018)/  Leukocytosis (7/14/2018)/  SIRS (systemic inflammatory response syndrome) ([facility] (7/14/2018): in the setting of immunosuppression.   --broad spectrum abx - pip-tazo   --WBC still up     Rhona-rectal abscess (7/14/2018): left side. Likely source of fever. IV antibiotics as above. S/p I+D on 7/14; wound cultures pending  --per surgery  --pelvic CT did not show pelvic abscess- repeat CT neg  --continue catheter  --To OR today for repeat I&D     Myelofibrosis ([facility] (7/14/2018)/ Anemia/Thrombocytopenia ([facility] (7/14/2018): follows up at [facility]. Hgb up to 8s after 2 units of PRBCs 7/14  --heme following     Hypokalemia (7/14/2018): likely from vomiting. Replete and follow K+     Nausea and vomiting (7/14/2018): likely from infection as above. Hydrate. IV anti-emetics. Monitor    Constipation  --Stool softeners daily  --Stop Miralax as this has not been effective in the past     Code Status:  Full     Problem List:  Problem List as of 7/25/2018  Date Reviewed: 7/14/2018          Codes Class Noted - Resolved    Myelofibrosis ([facility] ICD-10-CM: D75.81  ICD-9-CM: 289.83  7/14/2018 - Present        Thrombocytopenia ([facility] ICD-10-CM: D69.6  ICD-9-CM: 287.5  7/14/2018 - Present        Hypokalemia ICD-10-CM: E87.6  ICD-9-CM: 276.8  7/14/2018 - Present        Leukocytosis ICD-10-CM: X56.888  ICD-9-CM: 288.60  7/14/2018 - Present        Nausea and vomiting ICD-10-CM: R11.2  ICD-9-CM: 787.01  7/14/2018 - Present        SIRS (systemic inflammatory response syndrome) ([facility] ICD-10-CM: R65.10  ICD-9-CM: 995.90  7/14/2018 - Present        Rhona-rectal abscess ICD-10-CM: K61.1  ICD-9-CM: 773  7/14/2018 - Present        * (Principal)Fever ICD-10-CM: R50.9  ICD-9-CM: 780.60  7/13/2018 - Present              Subjective:   Ms. Robin Hardy is a 75 y.o. female who is being admitted for Fever. Ms. Robin Hardy presented to our Emergency Department today complaining of intermittent fever and chills associated with generalized weakness. She has also been having nausea and vomiting but denies any headaches, flu like or respiratory symptoms, No abdominal discomfort or diarrhea. She has not has any urinary symptoms. No overt focal symptoms. She does have a Hx of myelofibrosis and follows up at [facility]. Work up thus far has been unremarkable. In light of her immunosuppression, she will be admitted for further management. [Dr Mohan Esparza     7/14: Pt found to have perirectal abscess. Afebrile since 5AM.  Still nauseated. NPO for I+D in OR today with Dr Do Done. 7/15: continues to be nauseated. Labs pending this AM.  Surgeon doesn't feel it is much better despite I+D yesterday. Pt says pain is better controlled with percocet. 7/16/18: K+ 2.6. Repleting with IV. (6 total) WBC a little better. Tmax 98.8.     7/17:  Pt reports she is feeling somewhat better. Dr Marisa Maynard from ([address] [place] [address]) called last night (see note from last night) and wanted Mario 2 inhibitor restarted at 1/2 dose and weaned off from there. WBC remains 15K. K+ back down - replacing. Tmax 101.9. Tnow 98.6. Diuresed about a liter overnight. Checking Mag also. 7/18:  Reports \"starting to feel better. \"  Little pain from surg site. Tmax 99. K+ 3.1 - cont to replace. WBC remains at 15K. LFTs sl up - cont to monitor. 7/19/18: Less pain. Finally had a BM and feels better. WBC still up. Tmax 99.7    7/20:  She reports she feels better but remains very weak. Cont to need IV antibiotics. WBC up to 20K. Tmax 99. K+ normalized. LFTs better. 7/21:  Tmax 99. WBC up again. Will repeat CT of the pelvis as her WBC is higher. She is having less pain. 7/22/18: Feeling pretty well. WBC is still high but repeat CT was neg for abscess. Will ask surgery to see again tomorrow. 7/23/18: Continues to grad feel better and wants to go home today.  Will ask surgery to see again and if nothing else needs to be done then home later today. She wants to leave corcoran in place as urine irritates her abscess site. WBC elevated but discussed with Heme-Onc and may be due to the Myelofibrosis but she is to follow up with Dr Ayse Manrique at [facility] later this wk or early next wk. Will discuss with ID regarding outpt antibiotics. 18: Feeling a bit better today. For OR today for repeat I&D. WBC 22.     18: For OR today. No complaints. WBC 23. She is NPO  Review of Systems:   A comprehensive review of systems was negative except for that written in the HPI. Objective:   Physical Exam:     Visit Vitals    /76 (BP 1 Location: Right arm, BP Patient Position: At rest)    Pulse 91    Temp 98.9 °F (37.2 °C)    Resp 18    Ht 5' 6\" (1.676 m)    Wt 142 lb 9.6 oz (64.7 kg)    SpO2 96%    BMI 23.02 kg/m2    O2 Flow Rate (L/min): 2 l/min O2 Device: Room air    Temp (24hrs), Av.7 °F (37.1 °C), Min:98.3 °F (36.8 °C), Max:99.1 °F (37.3 °C)         1901 -  0700  In: 100 [P.O.:100]  Out: 3550 [Urine:3550]    General:  Alert, cooperative, no distress, appears stated age   Head:  Normocephalic, without obvious abnormality, atraumatic. Eyes:  Conjunctivae/corneas clear. PERRL, EOMs intact. Nose: Nares normal. Septum midline. Throat: Lips, mucosa, and tongue dry   Neck: Supple, symmetrical, trachea midline, no adenopathy, thyroid: no enlargement/tenderness/nodules, no carotid bruit and no JVD. Back:   Symmetric, no curvature. ROM normal. No CVA tenderness. Lungs:   A few basilar crackles bilaterally. Chest wall:  No tenderness or deformity. Heart:  Regular rate and rhythm, S1, S2 normal, 1/9 ASHA, click, rub or gallop. Abdomen:   Soft, non-tender. Bowel sounds normal. No masses,  No organomegaly. Extremities: Extremities normal, atraumatic, no cyanosis or edema. No calf tenderness or cords. Pulses: 2+ and symmetric all extremities.    Skin: Skin color, texture, turgor normal. No rashes or lesions   Neurologic: CNII-XII intact. Alert and oriented X 3. Fine motor of hands and fingers normal.   equal.  Gait not tested at this time. Sensation grossly normal to touch. Gross motor of extremities normal.       Data Review:       Recent Days:  Recent Labs      07/25/18 0315 07/24/18 0108 07/23/18 0126   WBC  23.6*  22.9*  23.8*   HGB  9.2*  8.6*  9.0*   HCT  29.7*  28.1*  29.3*   PLT  116*  96*  99*     Recent Labs      07/25/18 0315 07/24/18 0108 07/23/18 0126   NA   --    --   140   K   --    --   4.0   CL   --    --   105   CO2   --    --   27   GLU   --    --   107*   BUN   --    --   10   CREA   --    --   0.85   CA   --    --   8.4*   MG  2.1  2.1  2.1   ALB   --    --   2.7*   SGOT   --    --   39*   ALT   --    --   38     No results for input(s): PH, PCO2, PO2, HCO3, FIO2 in the last 72 hours.     24 Hour Results:  Recent Results (from the past 24 hour(s))   TYPE & SCREEN    Collection Time: 07/24/18 11:52 AM   Result Value Ref Range    Crossmatch Expiration 07/27/2018     ABO/Rh(D) A NEGATIVE     Antibody screen POS     Antibody ID NO ADDITIONAL ANTIBODIES DETECTED     Comment previously identified Anti D and Anti C     Unit number [id number]     Blood component type  LR     Unit division 00     Status of unit ALLOCATED     ANTIGEN/ANTIBODY INFO C NEGATIVE,     Crossmatch result Compatible     Unit number [id number]     Blood component type  LR,2     Unit division 00     Status of unit ALLOCATED     ANTIGEN/ANTIBODY INFO C NEGATIVE,     Crossmatch result Compatible    MAGNESIUM    Collection Time: 07/25/18  3:15 AM   Result Value Ref Range    Magnesium 2.1 1.6 - 2.4 mg/dL   CBC WITH AUTOMATED DIFF    Collection Time: 07/25/18  3:15 AM   Result Value Ref Range    WBC 23.6 (H) 3.6 - 11.0 K/uL    RBC 3.10 (L) 3.80 - 5.20 M/uL    HGB 9.2 (L) 11.5 - 16.0 g/dL    HCT 29.7 (L) 35.0 - 47.0 %    MCV 95.8 80.0 - 99.0 FL    MCH 29.7 26.0 - 34.0 PG    MCHC 31.0 30.0 - 36.5 g/dL    RDW 18.8 (H) 11.5 - 14.5 %    PLATELET 374 (L) 483 - 400 K/uL    MPV 13.3 (H) 8.9 - 12.9 FL    NRBC 8.0 (H) 0  WBC    ABSOLUTE NRBC 1.88 (H) 0.00 - 0.01 K/uL    NEUTROPHILS 61 32 - 75 %    BAND NEUTROPHILS 1 0 - 6 %    LYMPHOCYTES 9 (L) 12 - 49 %    MONOCYTES 26 (H) 5 - 13 %    EOSINOPHILS 0 0 - 7 %    BASOPHILS 0 0 - 1 %    METAMYELOCYTES 2 (H) 0 %    MYELOCYTES 1 (H) 0 %    IMMATURE GRANULOCYTES 0 %    ABS. NEUTROPHILS 14.6 (H) 1.8 - 8.0 K/UL    ABS. LYMPHOCYTES 2.1 0.8 - 3.5 K/UL    ABS. MONOCYTES 6.1 (H) 0.0 - 1.0 K/UL    ABS. EOSINOPHILS 0.0 0.0 - 0.4 K/UL    ABS. BASOPHILS 0.0 0.0 - 0.1 K/UL    ABS. IMM.  GRANS. 0.0 K/UL    DF MANUAL      RBC COMMENTS ANISOCYTOSIS  1+        RBC COMMENTS STOMATOCYTES  1+           Medications reviewed  Current Facility-Administered Medications   Medication Dose Route Frequency    potassium chloride SR (KLOR-CON 10) tablet 20 mEq  20 mEq Oral BID    amoxicillin-clavulanate (AUGMENTIN) 875-125 mg per tablet 1 Tab  1 Tab Oral Q12H    doxycycline (VIBRA-TABS) tablet 100 mg  100 mg Oral Q12H    senna-docusate (PERICOLACE) 8.6-50 mg per tablet 1 Tab  1 Tab Oral BID    senna (SENOKOT) tablet 43 mg  5 Tab Oral QHS    ruxolitinib tab 15 mg  (Patient Supplied)  15 mg Oral DAILY    aluminum-magnesium hydroxide (MAALOX) oral suspension 30 mL  30 mL Oral QID PRN    promethazine (PHENERGAN) 25 mg in NS IVPB  25 mg IntraVENous Q8H PRN    pantoprazole (PROTONIX) tablet 40 mg  40 mg Oral ACB    oxyCODONE-acetaminophen (PERCOCET) 5-325 mg per tablet 1 Tab  1 Tab Oral Q4H PRN    HYDROmorphone (DILAUDID) tablet 1 mg  1 mg Oral Q4H PRN    sodium chloride (NS) flush 5-10 mL  5-10 mL IntraVENous PRN    sodium chloride (NS) flush 5-10 mL  5-10 mL IntraVENous Q8H    sodium chloride (NS) flush 5-10 mL  5-10 mL IntraVENous PRN    acetaminophen (TYLENOL) tablet 650 mg  650 mg Oral Q4H PRN    ondansetron (ZOFRAN) injection 4 mg  4 mg IntraVENous Q4H PRN    0.9% sodium chloride infusion 250 mL  250 mL IntraVENous PRN     Care Plan discussed with: Patien/Heme-Onc and Nurse  Total time spent with patient: 30 minutes.     Fili Sky MD

## 2018-07-25 NOTE — ANESTHESIA PREPROCEDURE EVALUATION
Anesthetic History     PONV          Review of Systems / Medical History  Patient summary reviewed, nursing notes reviewed and pertinent labs reviewed    Pulmonary  Within defined limits                 Neuro/Psych   Within defined limits           Cardiovascular  Within defined limits                Exercise tolerance: >4 METS     GI/Hepatic/Renal  Within defined limits              Endo/Other        Anemia (hg=9.0)     Other Findings   Comments: Myelofibrosis           Physical Exam    Airway  Mallampati: II  TM Distance: 4 - 6 cm  Neck ROM: normal range of motion   Mouth opening: Normal     Cardiovascular    Rhythm: regular  Rate: normal         Dental    Dentition: Lower dentition intact and Upper dentition intact     Pulmonary  Breath sounds clear to auscultation               Abdominal         Other Findings            Anesthetic Plan    ASA: 2  Anesthesia type: general          Induction: Intravenous  Anesthetic plan and risks discussed with: Patient

## 2018-07-25 NOTE — BRIEF OP NOTE
BRIEF OPERATIVE NOTE    Date of Procedure: 7/25/2018   Preoperative Diagnosis: PERIRECTAL ABSCESS  Postoperative Diagnosis: S/A   Procedure(s):  Excision of 20 square centimeters of necrotic, infected skin and subcutaneous fat  Surgeon(s) and Role:     * Huy Elizondo MD - Primary           Surgical Staff:  Circ-1: Dc Sigala RN; Nj Hatch RN  Scrub Tech-1: Traci Deosuza  Surg Asst-1: Merissa Mercury  Event Time In   Incision Start 1926   Incision Close 1946     Anesthesia: General   Estimated Blood Loss: 50 cc  Specimens: * No specimens in log *   Findings: 6x4 cm area of necrotic skin and subcutaneous fat   Complications: none  Implants: * No implants in log *

## 2018-07-26 LAB
BASOPHILS # BLD: 0 K/UL (ref 0–0.1)
BASOPHILS NFR BLD: 0 % (ref 0–1)
DIFFERENTIAL METHOD BLD: ABNORMAL
EOSINOPHIL # BLD: 0 K/UL (ref 0–0.4)
EOSINOPHIL NFR BLD: 0 % (ref 0–7)
ERYTHROCYTE [DISTWIDTH] IN BLOOD BY AUTOMATED COUNT: 18.6 % (ref 11.5–14.5)
HCT VFR BLD AUTO: 29.6 % (ref 35–47)
HGB BLD-MCNC: 9.1 G/DL (ref 11.5–16)
IMM GRANULOCYTES # BLD: 0 K/UL
IMM GRANULOCYTES NFR BLD AUTO: 0 %
LYMPHOCYTES # BLD: 2 K/UL (ref 0.8–3.5)
LYMPHOCYTES NFR BLD: 9 % (ref 12–49)
MAGNESIUM SERPL-MCNC: 2 MG/DL (ref 1.6–2.4)
MCH RBC QN AUTO: 29.4 PG (ref 26–34)
MCHC RBC AUTO-ENTMCNC: 30.7 G/DL (ref 30–36.5)
MCV RBC AUTO: 95.8 FL (ref 80–99)
METAMYELOCYTES NFR BLD MANUAL: 2 %
MONOCYTES # BLD: 5.4 K/UL (ref 0–1)
MONOCYTES NFR BLD: 24 % (ref 5–13)
NEUTS BAND NFR BLD MANUAL: 17 % (ref 0–6)
NEUTS SEG # BLD: 14.6 K/UL (ref 1.8–8)
NEUTS SEG NFR BLD: 48 % (ref 32–75)
NRBC # BLD: 1 K/UL (ref 0–0.01)
NRBC BLD-RTO: 4.5 PER 100 WBC
PATH REV BLD -IMP: ABNORMAL
PLATELET # BLD AUTO: 111 K/UL (ref 150–400)
PLATELET COMMENTS,PCOM: ABNORMAL
PMV BLD AUTO: 12.7 FL (ref 8.9–12.9)
RBC # BLD AUTO: 3.09 M/UL (ref 3.8–5.2)
RBC MORPH BLD: ABNORMAL
WBC # BLD AUTO: 22.5 K/UL (ref 3.6–11)

## 2018-07-26 PROCEDURE — 85025 COMPLETE CBC W/AUTO DIFF WBC: CPT | Performed by: FAMILY MEDICINE

## 2018-07-26 PROCEDURE — 65270000029 HC RM PRIVATE

## 2018-07-26 PROCEDURE — 74011000258 HC RX REV CODE- 258: Performed by: INTERNAL MEDICINE

## 2018-07-26 PROCEDURE — 74011250637 HC RX REV CODE- 250/637: Performed by: FAMILY MEDICINE

## 2018-07-26 PROCEDURE — 94760 N-INVAS EAR/PLS OXIMETRY 1: CPT

## 2018-07-26 PROCEDURE — 74011250637 HC RX REV CODE- 250/637: Performed by: SURGERY

## 2018-07-26 PROCEDURE — 83735 ASSAY OF MAGNESIUM: CPT | Performed by: FAMILY MEDICINE

## 2018-07-26 PROCEDURE — 74011250637 HC RX REV CODE- 250/637: Performed by: INTERNAL MEDICINE

## 2018-07-26 PROCEDURE — 36415 COLL VENOUS BLD VENIPUNCTURE: CPT | Performed by: FAMILY MEDICINE

## 2018-07-26 PROCEDURE — 74011250636 HC RX REV CODE- 250/636: Performed by: SURGERY

## 2018-07-26 PROCEDURE — 74011250636 HC RX REV CODE- 250/636: Performed by: INTERNAL MEDICINE

## 2018-07-26 RX ORDER — MORPHINE SULFATE 4 MG/ML
2 INJECTION INTRAVENOUS ONCE
Status: COMPLETED | OUTPATIENT
Start: 2018-07-26 | End: 2018-07-26

## 2018-07-26 RX ADMIN — ACETAMINOPHEN 650 MG: 325 TABLET ORAL at 10:52

## 2018-07-26 RX ADMIN — Medication 10 ML: at 13:26

## 2018-07-26 RX ADMIN — DOXYCYCLINE HYCLATE 100 MG: 100 TABLET, COATED ORAL at 22:02

## 2018-07-26 RX ADMIN — PIPERACILLIN SODIUM AND TAZOBACTAM SODIUM 3.38 G: 3; .375 INJECTION, POWDER, LYOPHILIZED, FOR SOLUTION INTRAVENOUS at 21:49

## 2018-07-26 RX ADMIN — POTASSIUM CHLORIDE 20 MEQ: 750 TABLET, EXTENDED RELEASE ORAL at 08:32

## 2018-07-26 RX ADMIN — HYDROMORPHONE HYDROCHLORIDE 1 MG: 2 TABLET ORAL at 04:58

## 2018-07-26 RX ADMIN — AMOXICILLIN AND CLAVULANATE POTASSIUM 1 TABLET: 875; 125 TABLET, FILM COATED ORAL at 08:32

## 2018-07-26 RX ADMIN — MORPHINE SULFATE 2 MG: 4 INJECTION INTRAVENOUS at 13:25

## 2018-07-26 RX ADMIN — PANTOPRAZOLE SODIUM 40 MG: 40 TABLET, DELAYED RELEASE ORAL at 06:22

## 2018-07-26 RX ADMIN — SENNOSIDES 8.6 MG: 8.6 TABLET, FILM COATED ORAL at 22:02

## 2018-07-26 RX ADMIN — PIPERACILLIN SODIUM AND TAZOBACTAM SODIUM 3.38 G: 3; .375 INJECTION, POWDER, LYOPHILIZED, FOR SOLUTION INTRAVENOUS at 13:26

## 2018-07-26 RX ADMIN — DOXYCYCLINE HYCLATE 100 MG: 100 TABLET, COATED ORAL at 08:31

## 2018-07-26 RX ADMIN — Medication 10 ML: at 13:25

## 2018-07-26 RX ADMIN — SENNOSIDES AND DOCUSATE SODIUM 1 TABLET: 8.6; 5 TABLET ORAL at 08:32

## 2018-07-26 RX ADMIN — SENNOSIDES AND DOCUSATE SODIUM 1 TABLET: 8.6; 5 TABLET ORAL at 21:49

## 2018-07-26 NOTE — PROGRESS NOTES
Bedside shift change report given to Tanya Abebe RN (oncoming nurse) by Yara Hull RN (offgoing nurse). Report included the following information SBAR, Kardex and MAR.

## 2018-07-26 NOTE — OP NOTES
[facility]  OPERATIVE REPORT    Aden Saucedo  MR#: [id number]  : 1945  ACCOUNT #: [de-identified]   DATE OF SERVICE: 2018    PREOPERATIVE DIAGNOSES:  1. Perirectal abscess. 2.  Leukocytosis. 3.  Systemic inflammatory response syndrome. 4.  Skin and subcutaneous tissue necrosis. POSTOPERATIVE DIAGNOSES:  1. Perirectal abscess. 2.  Leukocytosis. 3.  Systemic inflammatory response syndrome. 4.  Skin and subcutaneous tissue necrosis. PROCEDURES PERFORMED:    1. Debridement of 24 square cm of subcutaneous tissue and skin for infection and necrosis. 2.  Exam under anesthesia. SURGEON:  Cydney Damico MD    ASSISTANT:  GET    ANESTHESIA:  General endotracheal.    ESTIMATED BLOOD LOSS:  25 mL. TUBES AND DRAINS:  None. SPECIMENS:  Debrided skin and subcutaneous tissue. COMPLICATIONS:  None apparent. IMPLANTS:  none. FINDINGS:  A 24 cm area (6 x 4 cm) of necrotic skin and subcutaneous tissue. INDICATIONS FOR PROCEDURE:  The patient is a 67-year-old female with myelofibrosis, status post bone marrow transplant, who presents to the emergency department with a rapidly progressing ischiorectal abscess. The patient underwent incision and drainage 11 days prior and initially did well. However, she has had a steadily increasing white blood cell count and the wound demonstrates progressive skin and subcutaneous tissue necrosis, so the patient presents for debridement. DESCRIPTION OF PROCEDURE:  The patient was identified in the preoperative holding area. Informed consent obtained. The site was marked. She was then taken to the operating room and placed in supine position, underwent general endotracheal anesthesia without complication. She was then placed in the lithotomy position. Her perineum and buttocks were then prepped and draped in the usual sterile fashion.   A timeout was performed to confirm that she was the patient and that she was presenting for debridement of necrotic tissue. Once this was confirmed, the area was inspected. The necrotic tissue encompassed an area 6 cm anterior to posterior and 4 cm wide. Electrocautery was used to excise out the necrotic appearing area back to healthy punctate bleeding subcutaneous fat. The excised material demonstrated purulent material in small pockets between the dermal and epidermal junction. The subcutaneous fat in the excised area was gray to white with no bleeding whatsoever. Once this was excised down to healthy bleeding fat, the area was examined. A digital rectal exam was then performed to confirm no communication with the rectum as well as a visual inspection. There was no fistula tract or other communication identified between the rectum and this ischiorectal space where all of the inflammation and necrosis was present. Once this was confirmed, the area was pulse lavaged with 3 L normal saline containing bacitracin. Hemostasis was obtained in the wound with electrocautery. The wound was then packed with normal saline-soaked Kerlix and a dry dressing applied. The debrided tissue was sent to pathology for evaluation. The patient was then extubated in the room and taken to postanesthesia care in stable condition. Instrument and sponge counts correct x2.       MD Dung Leon / Kizzy Bazan  D: 07/26/2018 13:10     T: 07/26/2018 13:46  JOB #: [id number]

## 2018-07-26 NOTE — PERIOP NOTES
TRANSFER - OUT REPORT:    Verbal report given to [facility] RN(name) on Leonardo Winston  being transferred to [facility](unit) for routine post - op       Report consisted of patients Situation, Background, Assessment and   Recommendations(SBAR). Information from the following report(s) SBAR, Kardex and MAR was reviewed with the receiving nurse. Lines:   Peripheral IV 07/25/18 Left Forearm (Active)   Site Assessment Clean, dry, & intact 7/25/2018  8:00 PM   Phlebitis Assessment 0 7/25/2018  8:00 PM   Infiltration Assessment 0 7/25/2018  8:00 PM   Dressing Status Clean, dry, & intact 7/25/2018  8:00 PM   Dressing Type Transparent 7/25/2018  8:00 PM   Hub Color/Line Status Pink; Infusing 7/25/2018  8:00 PM   Alcohol Cap Used Yes 7/25/2018  4:00 PM        Opportunity for questions and clarification was provided.       Patient transported with:   Registered Nurse

## 2018-07-26 NOTE — PROGRESS NOTES
Problem: Surgical Wound Care  Goal: *Non-infected Wound: Absence of infection signs and symptoms  Infection control procedures (eg: clean dressings, clean gloves, hand washing, precautions to isolate wound from contamination, sterile instruments used for wound debridement) should be implemented. Outcome: Progressing Towards Goal  Wound checked; drainage minimum; will change dsg today.

## 2018-07-26 NOTE — PROGRESS NOTES
NUTRITION    RECOMMENDATIONS:     1. Continue to encourage  Po intake and Ensure for wound healing    ASSESSMENT:   7/26: Patient seen for f/u. Pt went back to surgery yesterday for excision of 20 square centimeters of necrotic, infected skin and subcutaneous fat. Pt reports she ate well at breakfast this morning once her diet was advanced. States she likes the Ensure High Protein. RD to add BID for wound healing. Noted some possible weight loss with wt at 142 lbs and  lbs. Encouraged pt to continue to eat well at meals and continue Ensure. One serving of Ensure supplies 160 kcals and 16 gms protein. Pt verbalized understanding,    7/19: Patient seen due to LOS. Admitted with fever, s/p incision and Drainage, excisional debridement of   perirectal abscess. Visited pt who states her appetite is good, finally had a bowel movement so she is feeling better. She is in the process of moving as well as she care for her  who is wheelchair bound. States she eats well at breakfast and lunch, then gets tired by dinner time. We discussed easy meal preparation as well as keeping ONS at home if needed. Pt verbalized understanding, writer provide her with a coupon and will send Ensure High Protein at PM snack for her to try. She denies any weight changes, reports  lbs. Past Medical History:   Diagnosis Date    Myelofibrosis ([facility]      BMT in 2013 for MF but relapsed soon after.  Has since been on Jakafi and follows with Hematology at [facility]       Diet: Regular, Ensure High Protein PM snack    Abd:  Active bs       BM: 7/25    Skin Integrity: []Intact  [x]Other: Perineum wound from surgery  Edema: [x]None []Other    Nutritionally Significant Medications: [x] Reviewed & Includes: Maalox, Doxycycline,  Dilaudid, LR @ 125 ml/hr, Zofran, Protonix, Phenergan, Senokot    Labs:    Lab Results   Component Value Date/Time    Sodium 140 07/23/2018 01:26 AM    Potassium 4.0 07/23/2018 01:26 AM    Chloride 105 07/23/2018 01:26 AM    CO2 27 07/23/2018 01:26 AM    Anion gap 8 07/23/2018 01:26 AM    Glucose 107 (H) 07/23/2018 01:26 AM    BUN 10 07/23/2018 01:26 AM    Creatinine 0.85 07/23/2018 01:26 AM    Calcium 8.4 (L) 07/23/2018 01:26 AM    Magnesium 2.0 07/26/2018 02:54 AM    Albumin 2.7 (L) 07/23/2018 01:26 AM       Anthropometrics:   Weight Source: Bed  Height: 5' 6\" (167.6 cm),    Body mass index is 22.99 kg/(m^2). IBW : 61.2 kg (135 lb), % IBW (Calculated): 119.04 %, Usual Body Weight: 68 kg (150 lb),    Wt Readings from Last 5 Encounters:   07/25/18 64.6 kg (142 lb 6.7 oz)       Estimated Daily Nutrition Requirements:   Weight Used: Actual wt (142 lbs)  Kcals: 1675 Kcals/day (to 1810) Based on:[place] Jeor (BMR x 1.1 x 1.2)  Protein: 70 g (to 80 gms, 1.1-1.25 gms/kg)   Fluid:  (1ml/kcal)      Education & Discharge Needs:   [] Pt discussed in ID rounds     Nutrition related discharge needs addressed:     [x] Supplements (on d/c instruction &/or coupons provided)    [] Tube Feedings     [] Education    []No nutrition related discharge needs at this time     Cultural, Methodist and ethnic food preferences identified    [x] None   [] Yes     NUTRITION DIAGNOSIS:     Increased nutrient needs related to wound healing  as evidenced by perineum wound from I&D , excisional debridement of perirectal abcess                     Pt is at Nutrition Risk:  [x]    No Nutrition Risk Identified:  []    RD INTERVENTION / PT GOALS:     Food/Nutrient Delivery:   , Supplements: Commercial supplement (Ensure HIgh Protein PM snack),  ,  ,  and AM snack  Nutrition Education: ,    Nutrition Counseling:    Coordination of Care:      Goal: Pt will continue to eat well at meals with 100% of ONS to aid in wound healing prior to discharge    MONITORING & EVALUATION:   Food/Nutrient Intake Outcomes:  Total energy intake, Protein intake, Liquid meal replacement          Previous Nutrition Goals:  Previous Goal Met: Yes  Previous Recommendations:      Previous Recommendations Implemented: Yes       Chantale Garza RD

## 2018-07-26 NOTE — PROGRESS NOTES
Physical Therapy Note:    Patient cleared for PT weekly reassessment by RN. PT attempted to see patient this afternoon but patient declining at this time. Reason given was she had just received morphine in anticipation of wound debridement/dressing change by physician. Patient advised RN had cleared her and was unsure when/if the procedure would definitely occur. Patient continued to decline, stating she was not comfortable getting up after receiving morphine due to fear of fatigue/unsteadiness. Will f/u tomorrow as able/appropriate for PT re-assessment.     Marisela Belcher, MS, PT

## 2018-07-26 NOTE — PROGRESS NOTES
[facility] Progress Note           Salma Hunt DO    [phone] Office  [phone]  Fax    2018      Assessment & Plan:   1. Perirectal abscess. S/p I&D  and . Cultures growing ecoli. Restarted pip-tazo. Continue doxy. Can deescalate to augmentin and doxy at discharge  2. Myelofibrosis. Anemia/Thrombocytopenia Follows up at [facility]. 3. Leukocytosis. Above baseline. Monitor s/p debridement  4. Immunosuppressed host.  Haja Code restarted at 1/2 dose. 5. CHF. Cardiology following          Subjective:     No complainta    Objective:     Vitals:   Visit Vitals    /67 (BP 1 Location: Right arm, BP Patient Position: At rest)    Pulse 88    Temp 98.5 °F (36.9 °C)    Resp 16    Ht 5' 6\" (1.676 m)    Wt 64.6 kg (142 lb 6.7 oz)    SpO2 96%    BMI 22.99 kg/m2        Tmax:  Temp (24hrs), Av.6 °F (37 °C), Min:97.8 °F (36.6 °C), Max:99.5 °F (37.5 °C)      Exam:   Patient is intubated:  no    Physical Examination:   General:  Alert, cooperative, no distress   Head:  Normocephalic, atraumatic. Eyes:  Conjunctivae clear   Neck:        Lungs:   No distress. Chest wall:     Heart:     Abdomen:      Extremities: Moves all. Skin: Perirectal area packed   Neurologic: CNII-XII intact. Labs:        No lab exists for component: ITNL   No results for input(s): CPK, CKMB, TROIQ in the last 72 hours. Recent Labs      18   0254  18   0315  18   0108   MG  2.0  2.1  2.1   WBC  22.5*  23.6*  22.9*   HGB  9.1*  9.2*  8.6*   HCT  29.6*  29.7*  28.1*   PLT  111*  116*  96*     No results for input(s): INR, PTP, APTT in the last 72 hours.     No lab exists for component: INREXT, INREXT  Needs: urine analysis, urine sodium, protein and creatinine  No results found for: ANKUSH, CREAU      Cultures:     No results found for: SDES  Lab Results   Component Value Date/Time    Culture result: LIGHT ESCHERICHIA COLI (A) 2018 02:04 PM    Culture result: NO ANAEROBES ISOLATED 07/14/2018 02:04 PM    Culture result: NO GROWTH 6 DAYS 07/13/2018 09:36 PM       Radiology:     Medications       Current Facility-Administered Medications   Medication Dose Route Frequency Last Dose    piperacillin-tazobactam (ZOSYN) 3.375 g in 0.9% sodium chloride (MBP/ADV) 100 mL  3.375 g IntraVENous Q8H 3.375 g at 07/26/18 1326    lidocaine (PF) (XYLOCAINE) 10 mg/mL (1 %) injection 0.1 mL  0.1 mL SubCUTAneous PRN      lactated Ringers infusion  100 mL/hr IntraVENous CONTINUOUS 100 mL/hr at 07/25/18 1900    sodium chloride (NS) flush 5-10 mL  5-10 mL IntraVENous Q8H 10 mL at 07/26/18 1326    sodium chloride (NS) flush 5-10 mL  5-10 mL IntraVENous PRN      sodium chloride (NS) flush 5-10 mL  5-10 mL IntraVENous Q8H 10 mL at 07/26/18 1326    sodium chloride (NS) flush 5-10 mL  5-10 mL IntraVENous PRN      lidocaine (PF) (XYLOCAINE) 10 mg/mL (1 %) injection 0.1 mL  0.1 mL SubCUTAneous PRN      lactated Ringers infusion  125 mL/hr IntraVENous CONTINUOUS 125 mL/hr at 07/25/18 1649    sodium chloride (NS) flush 5-10 mL  5-10 mL IntraVENous Q8H 10 mL at 07/26/18 1325    sodium chloride (NS) flush 5-10 mL  5-10 mL IntraVENous PRN      naloxone (NARCAN) injection 0.04 mg  0.04 mg IntraVENous Multiple      flumazenil (ROMAZICON) 0.1 mg/mL injection 0.2 mg  0.2 mg IntraVENous Multiple      sodium chloride (NS) flush 5-10 mL  5-10 mL IntraVENous PRN      doxycycline (VIBRA-TABS) tablet 100 mg  100 mg Oral Q12H 100 mg at 07/26/18 0831    senna-docusate (PERICOLACE) 8.6-50 mg per tablet 1 Tab  1 Tab Oral BID 1 Tab at 07/26/18 6155    senna (SENOKOT) tablet 43 mg  5 Tab Oral QHS Stopped at 07/23/18 2200    ruxolitinib tab 15 mg  (Patient Supplied)  15 mg Oral DAILY 15 mg at 07/26/18 1057    aluminum-magnesium hydroxide (MAALOX) oral suspension 30 mL  30 mL Oral QID PRN 30 mL at 07/16/18 5047    promethazine (PHENERGAN) 25 mg in NS IVPB  25 mg IntraVENous Q8H PRN 25 mg at 07/16/18 0919    pantoprazole (PROTONIX) tablet 40 mg  40 mg Oral ACB 40 mg at 07/26/18 0622    oxyCODONE-acetaminophen (PERCOCET) 5-325 mg per tablet 1 Tab  1 Tab Oral Q4H PRN 1 Tab at 07/16/18 0120    HYDROmorphone (DILAUDID) tablet 1 mg  1 mg Oral Q4H PRN 1 mg at 07/26/18 0458    sodium chloride (NS) flush 5-10 mL  5-10 mL IntraVENous PRN      sodium chloride (NS) flush 5-10 mL  5-10 mL IntraVENous Q8H Stopped at 07/24/18 1400    sodium chloride (NS) flush 5-10 mL  5-10 mL IntraVENous PRN      acetaminophen (TYLENOL) tablet 650 mg  650 mg Oral Q4H  mg at 07/26/18 1052    ondansetron (ZOFRAN) injection 4 mg  4 mg IntraVENous Q4H PRN 4 mg at 07/25/18 2052    0.9% sodium chloride infusion 250 mL  250 mL IntraVENous PRN             Case discussed with:Dr Gricelda Delatorre, DO

## 2018-07-26 NOTE — ANESTHESIA POSTPROCEDURE EVALUATION
Post-Anesthesia Evaluation and Assessment    Patient: Lorna Gaffney MRN: [id number]  SSN: [id number]    YOB: 1945  Age: 75 y.o. Sex: female       Cardiovascular Function/Vital Signs  Visit Vitals    /64    Pulse 95    Temp 37.5 °C (99.5 °F)    Resp 16    Ht 5' 6\" (1.676 m)    Wt 64.6 kg (142 lb 6.7 oz)    SpO2 96%    BMI 22.99 kg/m2       Patient is status post general anesthesia for Procedure(s):  PERIRECTAL ABSCESS EXCISION. Nausea/Vomiting: None    Postoperative hydration reviewed and adequate. Pain:  Pain Scale 1: Numeric (0 - 10) (07/25/18 2017)  Pain Intensity 1: 5 (07/25/18 2017)   Managed    Neurological Status:   Neuro (WDL): Within Defined Limits (07/25/18 2000)  Neuro  LUE Motor Response: Purposeful (07/25/18 1704)  LLE Motor Response: Purposeful (07/25/18 1704)  RUE Motor Response: Purposeful (07/25/18 1704)  RLE Motor Response: Purposeful (07/25/18 1704)   At baseline    Mental Status and Level of Consciousness: Arousable    Pulmonary Status:   O2 Device: Room air (07/25/18 2005)   Adequate oxygenation and airway patent    Complications related to anesthesia: None    Post-anesthesia assessment completed.  No concerns    Signed By: Anyi Quijano MD     July 25, 2018

## 2018-07-26 NOTE — PROGRESS NOTES
SURGERY PROGRESS NOTE      Admit Date: 2018    POD 1 Day Post-Op    Procedure: Procedure(s):  PERIRECTAL ABSCESS EXCISION      Subjective:     Patient has no new complaints      Objective:     Visit Vitals    /67 (BP 1 Location: Right arm, BP Patient Position: At rest)    Pulse 88    Temp 98.5 °F (36.9 °C)    Resp 16    Ht 5' 6\" (1.676 m)    Wt 142 lb 6.7 oz (64.6 kg)    SpO2 96%    BMI 22.99 kg/m2        Temp (24hrs), Av.6 °F (37 °C), Min:97.8 °F (36.6 °C), Max:99.5 °F (37.5 °C)      701 -  1900  In: 802.5 [I.V.:802.5]  Out: -   1901 -  0700  In: 2410.4 [I.V.:2410.4]  Out: 2227 [Urine:2225]    Physical Exam:    General:  alert, cooperative, no distress, appears stated age   Wound:   clean, all tissue viable            Lab Results  Component Value Date/Time   WBC 22.5 (H) 2018 02:54 AM   HGB 9.1 (L) 2018 02:54 AM   HCT 29.6 (L) 2018 02:54 AM   PLATELET 499 (L) 72/10/7996 02:54 AM   MCV 95.8 2018 02:54 AM     Lab Results  Component Value Date/Time   GFR est non-AA >60 2018 01:26 AM   GFR est AA >60 2018 01:26 AM   Creatinine 0.85 2018 01:26 AM   BUN 10 2018 01:26 AM   Sodium 140 2018 01:26 AM   Potassium 4.0 2018 01:26 AM   Chloride 105 2018 01:26 AM   CO2 27 2018 01:26 AM   Magnesium 2.0 2018 02:54 AM       Assessment:     Principal Problem:    Fever (2018)    Active Problems:    Myelofibrosis (HCC) (2018)      Thrombocytopenia (HCC) (2018)      Hypokalemia (2018)      Leukocytosis (2018)      Nausea and vomiting (2018)      SIRS (systemic inflammatory response syndrome) ([facility] (2018)      Rhona-rectal abscess (2018)    doing well    Plan:       Local wound care  Would recommend discharge once WBC comes down

## 2018-07-26 NOTE — PROGRESS NOTES
Bedside shift change report given to Michael Rojas RN (oncoming nurse) by Benedict Walker RN (offgoing nurse). Report included the following information SBAR, Kardex and MAR.

## 2018-07-27 LAB
ALBUMIN SERPL-MCNC: 2.9 G/DL (ref 3.5–5)
ALBUMIN/GLOB SERPL: 0.8 {RATIO} (ref 1.1–2.2)
ALP SERPL-CCNC: 106 U/L (ref 45–117)
ALT SERPL-CCNC: 24 U/L (ref 12–78)
ANION GAP SERPL CALC-SCNC: 7 MMOL/L (ref 5–15)
AST SERPL-CCNC: 31 U/L (ref 15–37)
BASOPHILS # BLD: 0 K/UL (ref 0–0.1)
BASOPHILS NFR BLD: 0 % (ref 0–1)
BILIRUB SERPL-MCNC: 0.7 MG/DL (ref 0.2–1)
BLASTS NFR BLD MANUAL: 2 %
BUN SERPL-MCNC: 18 MG/DL (ref 6–20)
BUN/CREAT SERPL: 20 (ref 12–20)
CALCIUM SERPL-MCNC: 8.5 MG/DL (ref 8.5–10.1)
CHLORIDE SERPL-SCNC: 102 MMOL/L (ref 97–108)
CO2 SERPL-SCNC: 28 MMOL/L (ref 21–32)
CREAT SERPL-MCNC: 0.92 MG/DL (ref 0.55–1.02)
DIFFERENTIAL METHOD BLD: ABNORMAL
EOSINOPHIL # BLD: 0 K/UL (ref 0–0.4)
EOSINOPHIL NFR BLD: 0 % (ref 0–7)
ERYTHROCYTE [DISTWIDTH] IN BLOOD BY AUTOMATED COUNT: 18.6 % (ref 11.5–14.5)
GLOBULIN SER CALC-MCNC: 3.8 G/DL (ref 2–4)
GLUCOSE SERPL-MCNC: 94 MG/DL (ref 65–100)
HCT VFR BLD AUTO: 24.9 % (ref 35–47)
HGB BLD-MCNC: 7.9 G/DL (ref 11.5–16)
IMM GRANULOCYTES # BLD: 0 K/UL
IMM GRANULOCYTES NFR BLD AUTO: 0 %
LYMPHOCYTES # BLD: 1.1 K/UL (ref 0.8–3.5)
LYMPHOCYTES NFR BLD: 8 % (ref 12–49)
MAGNESIUM SERPL-MCNC: 1.9 MG/DL (ref 1.6–2.4)
MCH RBC QN AUTO: 29.9 PG (ref 26–34)
MCHC RBC AUTO-ENTMCNC: 31.7 G/DL (ref 30–36.5)
MCV RBC AUTO: 94.3 FL (ref 80–99)
METAMYELOCYTES NFR BLD MANUAL: 5 %
MONOCYTES # BLD: 1.8 K/UL (ref 0–1)
MONOCYTES NFR BLD: 13 % (ref 5–13)
MYELOCYTES NFR BLD MANUAL: 1 %
NEUTS BAND NFR BLD MANUAL: 10 % (ref 0–6)
NEUTS SEG # BLD: 9.9 K/UL (ref 1.8–8)
NEUTS SEG NFR BLD: 61 % (ref 32–75)
NRBC # BLD: 0.64 K/UL (ref 0–0.01)
NRBC BLD-RTO: 4.6 PER 100 WBC
PLATELET # BLD AUTO: 81 K/UL (ref 150–400)
PMV BLD AUTO: ABNORMAL FL (ref 8.9–12.9)
POTASSIUM SERPL-SCNC: 4 MMOL/L (ref 3.5–5.1)
PROT SERPL-MCNC: 6.7 G/DL (ref 6.4–8.2)
RBC # BLD AUTO: 2.64 M/UL (ref 3.8–5.2)
RBC MORPH BLD: ABNORMAL
RBC MORPH BLD: ABNORMAL
SODIUM SERPL-SCNC: 137 MMOL/L (ref 136–145)
WBC # BLD AUTO: 13.9 K/UL (ref 3.6–11)

## 2018-07-27 PROCEDURE — 74011000258 HC RX REV CODE- 258: Performed by: INTERNAL MEDICINE

## 2018-07-27 PROCEDURE — 80053 COMPREHEN METABOLIC PANEL: CPT | Performed by: FAMILY MEDICINE

## 2018-07-27 PROCEDURE — 97116 GAIT TRAINING THERAPY: CPT

## 2018-07-27 PROCEDURE — 74011250637 HC RX REV CODE- 250/637: Performed by: FAMILY MEDICINE

## 2018-07-27 PROCEDURE — 74011250637 HC RX REV CODE- 250/637: Performed by: INTERNAL MEDICINE

## 2018-07-27 PROCEDURE — 36415 COLL VENOUS BLD VENIPUNCTURE: CPT | Performed by: FAMILY MEDICINE

## 2018-07-27 PROCEDURE — 74011250637 HC RX REV CODE- 250/637: Performed by: SURGERY

## 2018-07-27 PROCEDURE — 97164 PT RE-EVAL EST PLAN CARE: CPT

## 2018-07-27 PROCEDURE — 74011250636 HC RX REV CODE- 250/636: Performed by: INTERNAL MEDICINE

## 2018-07-27 PROCEDURE — 65270000029 HC RM PRIVATE

## 2018-07-27 PROCEDURE — 83735 ASSAY OF MAGNESIUM: CPT | Performed by: FAMILY MEDICINE

## 2018-07-27 PROCEDURE — 97530 THERAPEUTIC ACTIVITIES: CPT

## 2018-07-27 PROCEDURE — 85025 COMPLETE CBC W/AUTO DIFF WBC: CPT | Performed by: FAMILY MEDICINE

## 2018-07-27 RX ADMIN — SENNOSIDES AND DOCUSATE SODIUM 1 TABLET: 8.6; 5 TABLET ORAL at 20:55

## 2018-07-27 RX ADMIN — PIPERACILLIN SODIUM AND TAZOBACTAM SODIUM 3.38 G: 3; .375 INJECTION, POWDER, LYOPHILIZED, FOR SOLUTION INTRAVENOUS at 20:54

## 2018-07-27 RX ADMIN — PIPERACILLIN SODIUM AND TAZOBACTAM SODIUM 3.38 G: 3; .375 INJECTION, POWDER, LYOPHILIZED, FOR SOLUTION INTRAVENOUS at 12:45

## 2018-07-27 RX ADMIN — SENNOSIDES 17.2 MG: 8.6 TABLET, FILM COATED ORAL at 20:55

## 2018-07-27 RX ADMIN — Medication 10 ML: at 20:56

## 2018-07-27 RX ADMIN — PANTOPRAZOLE SODIUM 40 MG: 40 TABLET, DELAYED RELEASE ORAL at 08:46

## 2018-07-27 RX ADMIN — ACETAMINOPHEN 650 MG: 325 TABLET ORAL at 01:55

## 2018-07-27 RX ADMIN — DOXYCYCLINE HYCLATE 100 MG: 100 TABLET, COATED ORAL at 20:54

## 2018-07-27 RX ADMIN — Medication 10 ML: at 14:00

## 2018-07-27 RX ADMIN — PIPERACILLIN SODIUM AND TAZOBACTAM SODIUM 3.38 G: 3; .375 INJECTION, POWDER, LYOPHILIZED, FOR SOLUTION INTRAVENOUS at 05:34

## 2018-07-27 RX ADMIN — SENNOSIDES AND DOCUSATE SODIUM 1 TABLET: 8.6; 5 TABLET ORAL at 08:46

## 2018-07-27 RX ADMIN — DOXYCYCLINE HYCLATE 100 MG: 100 TABLET, COATED ORAL at 08:46

## 2018-07-27 RX ADMIN — PANTOPRAZOLE SODIUM 40 MG: 40 TABLET, DELAYED RELEASE ORAL at 06:16

## 2018-07-27 RX ADMIN — OXYCODONE HYDROCHLORIDE AND ACETAMINOPHEN 1 TABLET: 5; 325 TABLET ORAL at 17:33

## 2018-07-27 NOTE — PROGRESS NOTES
Daily Progress Note: 7/27/2018 [place] Anel Pollock MD 
 
Assessment/Plan:  
Sepsis POA due to below:Fever (7/13/2018)/  Leukocytosis (7/14/2018)/  SIRS (systemic inflammatory response syndrome) ([facility] (7/14/2018): in the setting of immunosuppression.  
--broad spectrum abx - pip-tazo  
--WBC improving Hrona-rectal abscess (7/14/2018): left side. Likely source of fever. IV antibiotics as above. S/p I+D on 7/14; wound cultures pending --per surgery --pelvic CT did not show pelvic abscess- repeat CT neg 
--continue catheter 
--Repeat I&D 7/25/18 
 
 Myelofibrosis ([facility] (7/14/2018)/ Anemia/Thrombocytopenia ([facility] (7/14/2018): follows up at [facility]. Hgb up to 8s after 2 units of PRBCs 7/14 
--heme following 
  
Hypokalemia (7/14/2018): likely from vomiting. Replete and follow K+ 
  
Nausea and vomiting (7/14/2018): likely from infection as above. resolved Constipation --Stool softeners daily --Stop Miralax as this has not been effective in the past 
  
Code Status:  Full Problem List: 
Problem List as of 7/27/2018  Date Reviewed: 7/14/2018 Codes Class Noted - Resolved Myelofibrosis ([facility] ICD-10-CM: D02.06 ICD-9-CM: 289.83  7/14/2018 - Present Thrombocytopenia ([facility] ICD-10-CM: D69.6 ICD-9-CM: 287.5  7/14/2018 - Present Hypokalemia ICD-10-CM: E87.6 ICD-9-CM: 276.8  7/14/2018 - Present Leukocytosis ICD-10-CM: X19.992 ICD-9-CM: 288.60  7/14/2018 - Present Nausea and vomiting ICD-10-CM: R11.2 ICD-9-CM: 787.01  7/14/2018 - Present SIRS (systemic inflammatory response syndrome) (HCC) ICD-10-CM: R65.10 ICD-9-CM: 995.90  7/14/2018 - Present Rhona-rectal abscess ICD-10-CM: K61.1 ICD-9-CM: 891  7/14/2018 - Present * (Principal)Fever ICD-10-CM: R50.9 ICD-9-CM: 780.60  7/13/2018 - Present Subjective:  
Ms. Lalito Fritz is a 75 y.o. female who is being admitted for Fever. Ms. Lalito Fritz presented to our Emergency Department today complaining of intermittent fever and chills associated with generalized weakness. She has also been having nausea and vomiting but denies any headaches, flu like or respiratory symptoms, No abdominal discomfort or diarrhea. She has not has any urinary symptoms. No overt focal symptoms. She does have a Hx of myelofibrosis and follows up at [facility]. Work up thus far has been unremarkable. In light of her immunosuppression, she will be admitted for further management. [Dr Johnie Thompson 
  
7/14: Pt found to have perirectal abscess. Afebrile since 5AM.  Still nauseated. NPO for I+D in OR today with Dr Elaine Jamil. 7/15: continues to be nauseated. Labs pending this AM.  Surgeon doesn't feel it is much better despite I+D yesterday. Pt says pain is better controlled with percocet. 7/16/18: K+ 2.6. Repleting with IV. (6 total) WBC a little better. Tmax 98.8.  
 
7/17:  Pt reports she is feeling somewhat better. Dr Emely Naqvi from ([address] [place] [address]) called last night (see note from last night) and wanted Mario 2 inhibitor restarted at 1/2 dose and weaned off from there. WBC remains 15K. K+ back down - replacing. Tmax 101.9. Tnow 98.6. Diuresed about a liter overnight. Checking Mag also. 7/18:  Reports \"starting to feel better. \"  Little pain from surg site. Tmax 99. K+ 3.1 - cont to replace. WBC remains at 15K. LFTs sl up - cont to monitor. 7/19/18: Less pain. Finally had a BM and feels better. WBC still up. Tmax 99.7 7/20:  She reports she feels better but remains very weak. Cont to need IV antibiotics. WBC up to 20K. Tmax 99. K+ normalized. LFTs better. 7/21:  Tmax 99. WBC up again. Will repeat CT of the pelvis as her WBC is higher. She is having less pain. 7/22/18: Feeling pretty well. WBC is still high but repeat CT was neg for abscess. Will ask surgery to see again tomorrow. 7/23/18: Continues to grad feel better and wants to go home today.  Will ask surgery to see again and if nothing else needs to be done then home later today. She wants to leave corcoran in place as urine irritates her abscess site. WBC elevated but discussed with Heme-Onc and may be due to the Myelofibrosis but she is to follow up with Dr Tatianna Ortiz at [facility] later this wk or early next wk. Will discuss with ID regarding outpt antibiotics. 18: Feeling a bit better today. For OR today for repeat I&D. WBC 22.  
 
18: For OR today. No complaints. WBC 23. She is NPO 
 
18: Went back to the OR last night. Having some pain but tolerable. WBC up. Tmax 99.5 18: WBC is better at 13.9. Hb down to 7.9. Tmax 99.2. Feeling pretty well. Review of Systems: A comprehensive review of systems was negative except for that written in the HPI. Objective:  
Physical Exam:  
 
Visit Vitals  /63 (BP 1 Location: Right arm, BP Patient Position: At rest)  Pulse 77  Temp 98.4 °F (36.9 °C)  Resp 16  
 Ht 5' 6\" (1.676 m)  Wt 142 lb 6.7 oz (64.6 kg)  SpO2 99%  BMI 22.99 kg/m2 O2 Flow Rate (L/min): 2 l/min O2 Device: Room air Temp (24hrs), Av.5 °F (36.9 °C), Min:97.9 °F (36.6 °C), Max:99.2 °F (37.3 °C) 
       1901 -  0700 In: 3312.9 [I.V.:3312.9] Out: 3527 [QYZAV:1592] General:  Alert, cooperative, no distress, appears stated age Head:  Normocephalic, without obvious abnormality, atraumatic. Eyes:  Conjunctivae/corneas clear. PERRL, EOMs intact. Nose: Nares normal. Septum midline. Throat: Lips, mucosa, and tongue normal  
Neck: Supple, symmetrical, trachea midline, no adenopathy, thyroid: no enlargement/tenderness/nodules, no carotid bruit and no JVD. Back:   Symmetric, no curvature. ROM normal. No CVA tenderness. Lungs:   Clear to auscultation Chest wall:  No tenderness or deformity. Heart:  Regular rate and rhythm, S1, S2 normal, 1/9 ASHA, click, rub or gallop. Abdomen:   Soft, non-tender. Bowel sounds normal. No masses,  No organomegaly.  : corcoran patent, labia with packing present. Less erythema and swelling. Extremities: Extremities normal, atraumatic, no cyanosis or edema. No calf tenderness or cords. Pulses: 2+ and symmetric all extremities. Skin: Skin color, texture, turgor normal. No rashes or lesions Neurologic: CNII-XII intact. Alert and oriented X 3. Fine motor of hands and fingers normal.   equal.  Gait not tested at this time. Sensation grossly normal to touch. Gross motor of extremities normal.    
 
Data Review:  
   
Recent Days: 
Recent Labs  
   07/27/18 0550  07/26/18 0254 07/25/18 0315 WBC  13.9*  22.5*  23.6* HGB  7.9*  9.1*  9.2* HCT  24.9*  29.6*  29.7* PLT  81*  111*  116* Recent Labs  
   07/27/18 0550  07/26/18 0254 07/25/18 0315 NA  137   --    --   
K  4.0   --    --   
CL  102   --    --   
CO2  28   --    --   
GLU  94   --    --   
BUN  18   --    --   
CREA  0.92   --    --   
CA  8.5   --    --   
MG  1.9  2.0  2.1 ALB  2.9*   --    --   
SGOT  31   --    --   
ALT  24   --    -- No results for input(s): PH, PCO2, PO2, HCO3, FIO2 in the last 72 hours. 24 Hour Results: 
Recent Results (from the past 24 hour(s)) CBC WITH AUTOMATED DIFF Collection Time: 07/27/18  5:50 AM  
Result Value Ref Range WBC 13.9 (H) 3.6 - 11.0 K/uL  
 RBC 2.64 (L) 3.80 - 5.20 M/uL HGB 7.9 (L) 11.5 - 16.0 g/dL HCT 24.9 (L) 35.0 - 47.0 % MCV 94.3 80.0 - 99.0 FL  
 MCH 29.9 26.0 - 34.0 PG  
 MCHC 31.7 30.0 - 36.5 g/dL  
 RDW 18.6 (H) 11.5 - 14.5 % PLATELET 81 (L) 131 - 400 K/uL MPV ABNORMAL 8.9 - 12.9 FL  
 NRBC 4.6 (H) 0  WBC ABSOLUTE NRBC 0.64 (H) 0.00 - 0.01 K/uL NEUTROPHILS 61 32 - 75 % BAND NEUTROPHILS 10 (H) 0 - 6 % LYMPHOCYTES 8 (L) 12 - 49 % MONOCYTES 13 5 - 13 % EOSINOPHILS 0 0 - 7 % BASOPHILS 0 0 - 1 % METAMYELOCYTES 5 (H) 0 % MYELOCYTES 1 (H) 0 % BLASTS 2 (H) 0 % IMMATURE GRANULOCYTES 0 %  
 ABS.  NEUTROPHILS 9.9 (H) 1.8 - 8.0 K/UL  
 ABS. LYMPHOCYTES 1.1 0.8 - 3.5 K/UL  
 ABS. MONOCYTES 1.8 (H) 0.0 - 1.0 K/UL  
 ABS. EOSINOPHILS 0.0 0.0 - 0.4 K/UL  
 ABS. BASOPHILS 0.0 0.0 - 0.1 K/UL  
 ABS. IMM. GRANS. 0.0 K/UL  
 DF MANUAL    
 RBC COMMENTS ANISOCYTOSIS 1+ 
    
 RBC COMMENTS POLYCHROMASIA 1+ METABOLIC PANEL, COMPREHENSIVE Collection Time: 07/27/18  5:50 AM  
Result Value Ref Range Sodium 137 136 - 145 mmol/L Potassium 4.0 3.5 - 5.1 mmol/L Chloride 102 97 - 108 mmol/L  
 CO2 28 21 - 32 mmol/L Anion gap 7 5 - 15 mmol/L Glucose 94 65 - 100 mg/dL BUN 18 6 - 20 MG/DL Creatinine 0.92 0.55 - 1.02 MG/DL  
 BUN/Creatinine ratio 20 12 - 20 GFR est AA >60 >60 ml/min/1.73m2 GFR est non-AA 60 (L) >60 ml/min/1.73m2 Calcium 8.5 8.5 - 10.1 MG/DL Bilirubin, total 0.7 0.2 - 1.0 MG/DL  
 ALT (SGPT) 24 12 - 78 U/L  
 AST (SGOT) 31 15 - 37 U/L Alk. phosphatase 106 45 - 117 U/L Protein, total 6.7 6.4 - 8.2 g/dL Albumin 2.9 (L) 3.5 - 5.0 g/dL Globulin 3.8 2.0 - 4.0 g/dL A-G Ratio 0.8 (L) 1.1 - 2.2 MAGNESIUM Collection Time: 07/27/18  5:50 AM  
Result Value Ref Range Magnesium 1.9 1.6 - 2.4 mg/dL Medications reviewed Current Facility-Administered Medications Medication Dose Route Frequency  piperacillin-tazobactam (ZOSYN) 3.375 g in 0.9% sodium chloride (MBP/ADV) 100 mL  3.375 g IntraVENous Q8H  
 lidocaine (PF) (XYLOCAINE) 10 mg/mL (1 %) injection 0.1 mL  0.1 mL SubCUTAneous PRN  
 sodium chloride (NS) flush 5-10 mL  5-10 mL IntraVENous Q8H  
 sodium chloride (NS) flush 5-10 mL  5-10 mL IntraVENous PRN  
 sodium chloride (NS) flush 5-10 mL  5-10 mL IntraVENous Q8H  
 sodium chloride (NS) flush 5-10 mL  5-10 mL IntraVENous PRN  
 lidocaine (PF) (XYLOCAINE) 10 mg/mL (1 %) injection 0.1 mL  0.1 mL SubCUTAneous PRN  
 sodium chloride (NS) flush 5-10 mL  5-10 mL IntraVENous Q8H  
 sodium chloride (NS) flush 5-10 mL  5-10 mL IntraVENous PRN  
 naloxone (NARCAN) injection 0.04 mg  0.04 mg IntraVENous Multiple  flumazenil (ROMAZICON) 0.1 mg/mL injection 0.2 mg  0.2 mg IntraVENous Multiple  sodium chloride (NS) flush 5-10 mL  5-10 mL IntraVENous PRN  
 doxycycline (VIBRA-TABS) tablet 100 mg  100 mg Oral Q12H  
 senna-docusate (PERICOLACE) 8.6-50 mg per tablet 1 Tab  1 Tab Oral BID  senna (SENOKOT) tablet 43 mg  5 Tab Oral QHS  ruxolitinib tab 15 mg  (Patient Supplied)  15 mg Oral DAILY  aluminum-magnesium hydroxide (MAALOX) oral suspension 30 mL  30 mL Oral QID PRN  promethazine (PHENERGAN) 25 mg in NS IVPB  25 mg IntraVENous Q8H PRN  pantoprazole (PROTONIX) tablet 40 mg  40 mg Oral ACB  oxyCODONE-acetaminophen (PERCOCET) 5-325 mg per tablet 1 Tab  1 Tab Oral Q4H PRN  
 HYDROmorphone (DILAUDID) tablet 1 mg  1 mg Oral Q4H PRN  
 sodium chloride (NS) flush 5-10 mL  5-10 mL IntraVENous PRN  
 sodium chloride (NS) flush 5-10 mL  5-10 mL IntraVENous Q8H  
 sodium chloride (NS) flush 5-10 mL  5-10 mL IntraVENous PRN  
 acetaminophen (TYLENOL) tablet 650 mg  650 mg Oral Q4H PRN  
 ondansetron (ZOFRAN) injection 4 mg  4 mg IntraVENous Q4H PRN  
 0.9% sodium chloride infusion 250 mL  250 mL IntraVENous PRN Care Plan discussed with: Patient Total time spent with patient: 30 minutes.  
 
Gilma Rose MD

## 2018-07-27 NOTE — PROGRESS NOTES
Bedside shift change report given to Lee Gee RN (oncoming nurse) by Mary Kate Velazco RN (offgoing nurse). Report included the following information SBAR, Kardex and MAR.

## 2018-07-27 NOTE — PROGRESS NOTES
WBC down. Has been stable. Can be discharged home from a surgical perspective. Will see PRN if she stays over the weekend.

## 2018-07-27 NOTE — PROGRESS NOTES
Bedside shift change report given to Aurora Aponte RN (oncoming nurse) by Ian Abdullahi RN (offgoing nurse). Report included the following information SBAR, Kardex and MAR.

## 2018-07-27 NOTE — PROGRESS NOTES
[facility] Progress Note Maryellen Lebron DO 
  [phone] Office [phone]  Fax 2018 Assessment & Plan: 1. Perirectal abscess. S/p I&D  and . Cultures growing ecoli. Continue pip-tazo and doxy through . WBC trending down 2. Myelofibrosis. Anemia/Thrombocytopenia Follows up at [facility]. 3. Leukocytosis. Above baseline. Monitor s/p debridement 4. Immunosuppressed host.  Pravin Ray restarted at 1/2 dose. 5. CHF. Cardiology following Subjective: No complaint Objective:  
 
Vitals:  
Visit Vitals  /69 (BP 1 Location: Right arm, BP Patient Position: At rest)  Pulse 75  Temp 97.9 °F (36.6 °C)  Resp 16  
 Ht 5' 6\" (1.676 m)  Wt 63.8 kg (140 lb 10.5 oz)  SpO2 99%  BMI 22.7 kg/m2 Tmax:  Temp (24hrs), Av.5 °F (36.9 °C), Min:97.9 °F (36.6 °C), Max:99.2 °F (37.3 °C) Exam:  
Patient is intubated:  no 
 
Physical Examination:  
General:  Alert, cooperative, no distress Head:  Normocephalic, atraumatic. Eyes:  Conjunctivae clear Neck:   
   
Lungs:   No distress. Chest wall:    
Heart:    
Abdomen:     
Extremities: Moves all. Skin: Perirectal area packed Neurologic: CNII-XII intact. Labs:     
 
No lab exists for component: ITNL No results for input(s): CPK, CKMB, TROIQ in the last 72 hours. Recent Labs  
   18 
 0550  18 
 0254  18 
 0315 NA  137   --    --   
K  4.0   --    --   
CL  102   --    --   
CO2  28   --    --   
BUN  18   --    --   
CREA  0.92   --    --   
GLU  94   --    --   
MG  1.9  2.0  2.1 ALB  2.9*   --    --   
WBC  13.9*  22.5*  23.6* HGB  7.9*  9.1*  9.2* HCT  24.9*  29.6*  29.7* PLT  81*  111*  116* No results for input(s): INR, PTP, APTT in the last 72 hours. No lab exists for component: INREXT, INREXT Needs: urine analysis, urine sodium, protein and creatinine No results found for: Mariajose Henry Cultures: No results found for: SDES Lab Results Component Value Date/Time Culture result: LIGHT ESCHERICHIA COLI (A) 07/14/2018 02:04 PM  
 Culture result: NO ANAEROBES ISOLATED 07/14/2018 02:04 PM  
 Culture result: NO GROWTH 6 DAYS 07/13/2018 09:36 PM  
 
 
Radiology:  
 
Medications Current Facility-Administered Medications Medication Dose Route Frequency Last Dose  piperacillin-tazobactam (ZOSYN) 3.375 g in 0.9% sodium chloride (MBP/ADV) 100 mL  3.375 g IntraVENous Q8H 3.375 g at 07/27/18 1245  lidocaine (PF) (XYLOCAINE) 10 mg/mL (1 %) injection 0.1 mL  0.1 mL SubCUTAneous PRN    
 sodium chloride (NS) flush 5-10 mL  5-10 mL IntraVENous Q8H 10 mL at 07/26/18 1326  sodium chloride (NS) flush 5-10 mL  5-10 mL IntraVENous PRN    
 sodium chloride (NS) flush 5-10 mL  5-10 mL IntraVENous Q8H 10 mL at 07/26/18 1326  sodium chloride (NS) flush 5-10 mL  5-10 mL IntraVENous PRN    
 lidocaine (PF) (XYLOCAINE) 10 mg/mL (1 %) injection 0.1 mL  0.1 mL SubCUTAneous PRN    
 sodium chloride (NS) flush 5-10 mL  5-10 mL IntraVENous Q8H 10 mL at 07/26/18 1325  sodium chloride (NS) flush 5-10 mL  5-10 mL IntraVENous PRN    
 naloxone (NARCAN) injection 0.04 mg  0.04 mg IntraVENous Multiple  flumazenil (ROMAZICON) 0.1 mg/mL injection 0.2 mg  0.2 mg IntraVENous Multiple  sodium chloride (NS) flush 5-10 mL  5-10 mL IntraVENous PRN    
 doxycycline (VIBRA-TABS) tablet 100 mg  100 mg Oral Q12H 100 mg at 07/27/18 0846  
 senna-docusate (PERICOLACE) 8.6-50 mg per tablet 1 Tab  1 Tab Oral BID 1 Tab at 07/27/18 0846  
 senna (SENOKOT) tablet 43 mg  5 Tab Oral QHS 8.6 mg at 07/26/18 2202  ruxolitinib tab 15 mg  (Patient Supplied)  15 mg Oral DAILY 15 mg at 07/27/18 1022  aluminum-magnesium hydroxide (MAALOX) oral suspension 30 mL  30 mL Oral QID PRN 30 mL at 07/16/18 2147  promethazine (PHENERGAN) 25 mg in NS IVPB  25 mg IntraVENous Q8H PRN 25 mg at 07/16/18 0919  pantoprazole (PROTONIX) tablet 40 mg  40 mg Oral ACB 40 mg at 07/27/18 3821  oxyCODONE-acetaminophen (PERCOCET) 5-325 mg per tablet 1 Tab  1 Tab Oral Q4H PRN 1 Tab at 07/16/18 0120  
 HYDROmorphone (DILAUDID) tablet 1 mg  1 mg Oral Q4H PRN 1 mg at 07/26/18 0458  sodium chloride (NS) flush 5-10 mL  5-10 mL IntraVENous PRN    
 sodium chloride (NS) flush 5-10 mL  5-10 mL IntraVENous Q8H 10 mL at 07/27/18 1400  
 sodium chloride (NS) flush 5-10 mL  5-10 mL IntraVENous PRN    
 acetaminophen (TYLENOL) tablet 650 mg  650 mg Oral Q4H  mg at 07/27/18 0155  ondansetron (ZOFRAN) injection 4 mg  4 mg IntraVENous Q4H PRN 4 mg at 07/25/18 2052  
 0.9% sodium chloride infusion 250 mL  250 mL IntraVENous PRN Case discussed with: 
 
 
Yudelka Harper, DO

## 2018-07-27 NOTE — PROGRESS NOTES
Problem: Mobility Impaired (Adult and Pediatric) Goal: *Acute Goals and Plan of Care (Insert Text) Physical Therapy Goals Initiated 7/19/2018 GOALS STILL RELEVANT 7/27/2018 1. Patient will transfer from bed to chair and chair to bed with modified independence using the least restrictive device within 3 day(s). 3.  Patient will perform sit to stand with modified independence within 3 day(s). 4.  Patient will ambulate with modified independence for 300 feet with the least restrictive device within 3 day(s). 5.  Patient will ascend/descend 5 stairs with 1 handrail(s) with modified independence within 3 day(s). physical Therapy REEVALUATION Patient: Mary Smith (32 y.o. female) Date: 7/27/2018 Primary Diagnosis: Fever PERIRECTAL ABSCESS PERIRECTAL ABSCESS PERIRECTAL ABSCESS Procedure(s) (LRB): PERIRECTAL ABSCESS EXCISION (N/A) 2 Days Post-Op Precautions: Fall Chart, physical therapy assessment, plan of care and goals were reviewed. ASSESSMENT : 
Based on the objective data described below, the patient presents with generalized deconditioning and weakness but is making steady improvement in overall function and mobility tolerance and has improved balance as shown as improvement in her Tinetti balance score . She is currently performing bed mobility at Mod I level with use of bed rails and is ambulating 400+ft with rolling walker with supervision only for management of IV. Stairs were deferred today secondary to short IV line and corcoran but anticipate that she will able to tolerate stairs without issue but should complete with PT prior to discharge. Additionally, recommend trailing ambulation and transfers without assistive device to determine need. Will continue to follow for acute PT interventions while hospitalized, recommend HHPT to follow up upon discharge to progress endurance, independence and safety once home.   
 
Patient will benefit from skilled intervention to address the above impairments. Patients rehabilitation potential is considered to be Excellent Factors which may influence rehabilitation potential include:  
[x]           None noted 
[]           Mental ability/status []           Medical condition 
[]           Home/family situation and support systems 
[]           Safety awareness 
[]           Pain tolerance/management 
[]           Other: PLAN : 
Recommendations and Planned Interventions: 
[]             Bed Mobility Training             [x]      Neuromuscular Re-Education []             Transfer Training                   []      Orthotic/Prosthetic Training 
[x]             Gait Training                         []      Modalities []             Therapeutic Exercises           []      Edema Management/Control [x]             Therapeutic Activities            []      Patient and Family Training/Education []             Other (comment): Frequency/Duration: Patient will be followed by physical therapy 5 times a week to address goals. Discharge Recommendations: Home Health Further Equipment Recommendations for Discharge: Possibly a rolling walker, TBD SUBJECTIVE:  
Patient stated I'm so glad. I'd love to walk.  OBJECTIVE DATA SUMMARY:  
 
Past Medical History:  
Diagnosis Date  Myelofibrosis ([facility] [address] BMT in 2013 for MF but relapsed soon after. Has since been on Jakafi and follows with Hematology at [facility] Past Surgical History:  
Procedure Laterality Date  HX BONE MARROW TRANSPLANT  HX VASCULAR ACCESS    
 right [facility] course since last seen and reason for reevaluation: Several I&Ds and missed PT visits related but otherwise progressing Critical Behavior: 
  
  
  
  
Skin:  Defer to RN noted Strength:   
Strength: Within functional limits Range Of Motion: 
AROM: Within functional limits PROM: Within functional limits Functional Mobility: 
Bed Mobility: 
Rolling: Modified independent Supine to Sit: Modified independent Sit to Supine: Modified independent Scooting: Independent Transfers: 
Sit to Stand: Supervision Stand to Sit: Supervision Balance:  
Sitting: Intact Standing: Intact Ambulation/Gait Training: 
Distance (ft): 400 Feet (ft) Assistive Device: Walker, rolling Ambulation - Level of Assistance: Supervision Functional Measure: 
Tinetti test: 
 
Sitting Balance: 1 Arises: 1 Attempts to Rise: 2 Immediate Standing Balance: 1 Standing Balance: 1 Nudged: 2 Eyes Closed: 1 Turn 360 Degrees - Continuous/Discontinuous: 1 Turn 360 Degrees - Steady/Unsteady: 1 Sitting Down: 2 Balance Score: 13 Indication of Gait: 1 
R Step Length/Height: 1 L Step Length/Height: 1 
R Foot Clearance: 1 L Foot Clearance: 1 Step Symmetry: 1 Step Continuity: 1 Path: 1 Trunk: 0 Walking Time: 1 Gait Score: 9 Total Score: 22 Tinetti Test and G-code impairment scale: 
Percentage of Impairment CH 
 
0% 
 CI 
 
1-19% CJ 
 
20-39% CK 
 
40-59% CL 
 
60-79% CM 
 
80-99% CN  
 
100% Tinetti Score 0-28 28 23-27 17-22 12-16 6-11 1-5 0 Tinetti Tool Score Risk of Falls 
<19 = High Fall Risk 19-24 = Moderate Fall Risk 25-28 = Low Fall Risk Tinetti ME. Performance-Oriented Assessment of Mobility Problems in Elderly Patients. [address]; [id number]. (Scoring Description: PT Bulletin Feb. 10, 1993) Older adults: Tawnya Cao et al, 2009; n = [address] elderly evaluated with ABC, KARINA, ADL, and IADL) · Mean KARINA score for males aged 69-68 years = 26.21(3.40) · Mean KARINA score for females age 69-68 years = 25.16(4.30) · Mean KARINA score for males over 80 years = 23.29(6.02) · Mean KARINA score for females over 80 years = 17.20(8.32) G codes: In compliance with CMSs Claims Based Outcome Reporting, the following G-code set was chosen for this patient based on their primary functional limitation being treated: The outcome measure chosen to determine the severity of the functional limitation was the Tinetti with a score of 22/28 which was correlated with the impairment scale. ? Mobility - Walking and Moving Around:  
  - CURRENT STATUS: CJ - 20%-39% impaired, limited or restricted  - GOAL STATUS: CI - 1%-19% impaired, limited or restricted  - D/C STATUS:  ---------------To be determined---------------  
 
Pain: 
Pain Scale 1: Numeric (0 - 10) Pain Intensity 1: 0 Pain Location 1: Hip Pain Orientation 1: Left Pain Description 1: Aching Pain Intervention(s) 1: Medication (see MAR) Activity Tolerance: Tolerated session well, vitals stable throughout, denied pain Please refer to the flowsheet for vital signs taken during this treatment. After treatment:  
[x]  Patient left in no apparent distress sitting up in chair 
[]  Patient left in no apparent distress in bed 
[x]  Call bell left within reach [x]  Nursing notified 
[]  Caregiver present 
[]  Bed alarm activated COMMUNICATION/EDUCATION:  
The patients plan of care was discussed with: Registered Nurse. [x]  Fall prevention education was provided and the patient/caregiver indicated understanding. [x]  Patient/family have participated as able in goal setting and plan of care. []  Patient/family agree to work toward stated goals and plan of care. []  Patient understands intent and goals of therapy, but is neutral about his/her participation. []  Patient is unable to participate in goal setting and plan of care. Thank you for this referral. 
Glenroytamra Choudhury PT, DPT Time Calculation: 24 mins

## 2018-07-28 LAB
ABO + RH BLD: NORMAL
ANTIGENS PRESENT RBC DONR: NORMAL
ANTIGENS PRESENT RBC DONR: NORMAL
BASOPHILS # BLD: 0 K/UL (ref 0–0.1)
BASOPHILS NFR BLD: 0 % (ref 0–1)
BLD PROD TYP BPU: NORMAL
BLD PROD TYP BPU: NORMAL
BLOOD BANK CMNT PATIENT-IMP: NORMAL
BLOOD GROUP ANTIBODIES SERPL: NORMAL
BLOOD GROUP ANTIBODIES SERPL: NORMAL
BPU ID: NORMAL
BPU ID: NORMAL
CROSSMATCH RESULT,%XM: NORMAL
CROSSMATCH RESULT,%XM: NORMAL
DIFFERENTIAL METHOD BLD: ABNORMAL
EOSINOPHIL # BLD: 0 K/UL (ref 0–0.4)
EOSINOPHIL NFR BLD: 0 % (ref 0–7)
ERYTHROCYTE [DISTWIDTH] IN BLOOD BY AUTOMATED COUNT: 18.5 % (ref 11.5–14.5)
HCT VFR BLD AUTO: 25.6 % (ref 35–47)
HGB BLD-MCNC: 8.1 G/DL (ref 11.5–16)
IMM GRANULOCYTES # BLD: 0 K/UL
IMM GRANULOCYTES NFR BLD AUTO: 0 %
LYMPHOCYTES # BLD: 1 K/UL (ref 0.8–3.5)
LYMPHOCYTES NFR BLD: 8 % (ref 12–49)
MAGNESIUM SERPL-MCNC: 2 MG/DL (ref 1.6–2.4)
MCH RBC QN AUTO: 29.8 PG (ref 26–34)
MCHC RBC AUTO-ENTMCNC: 31.6 G/DL (ref 30–36.5)
MCV RBC AUTO: 94.1 FL (ref 80–99)
METAMYELOCYTES NFR BLD MANUAL: 4 %
MONOCYTES # BLD: 3.4 K/UL (ref 0–1)
MONOCYTES NFR BLD: 28 % (ref 5–13)
MYELOCYTES NFR BLD MANUAL: 2 %
NEUTS BAND NFR BLD MANUAL: 2 % (ref 0–6)
NEUTS SEG # BLD: 7.1 K/UL (ref 1.8–8)
NEUTS SEG NFR BLD: 56 % (ref 32–75)
NRBC # BLD: 0.42 K/UL (ref 0–0.01)
NRBC BLD-RTO: 3.4 PER 100 WBC
PLATELET # BLD AUTO: 71 K/UL (ref 150–400)
RBC # BLD AUTO: 2.72 M/UL (ref 3.8–5.2)
RBC MORPH BLD: ABNORMAL
RBC MORPH BLD: ABNORMAL
SPECIMEN EXP DATE BLD: NORMAL
STATUS OF UNIT,%ST: NORMAL
STATUS OF UNIT,%ST: NORMAL
UNIT DIVISION, %UDIV: 0
UNIT DIVISION, %UDIV: 0
WBC # BLD AUTO: 12.3 K/UL (ref 3.6–11)

## 2018-07-28 PROCEDURE — 74011250636 HC RX REV CODE- 250/636: Performed by: INTERNAL MEDICINE

## 2018-07-28 PROCEDURE — 74011250637 HC RX REV CODE- 250/637: Performed by: INTERNAL MEDICINE

## 2018-07-28 PROCEDURE — 65270000029 HC RM PRIVATE

## 2018-07-28 PROCEDURE — 36415 COLL VENOUS BLD VENIPUNCTURE: CPT | Performed by: SURGERY

## 2018-07-28 PROCEDURE — 74011000258 HC RX REV CODE- 258: Performed by: INTERNAL MEDICINE

## 2018-07-28 PROCEDURE — 74011250637 HC RX REV CODE- 250/637: Performed by: FAMILY MEDICINE

## 2018-07-28 PROCEDURE — 83735 ASSAY OF MAGNESIUM: CPT | Performed by: SURGERY

## 2018-07-28 PROCEDURE — 85025 COMPLETE CBC W/AUTO DIFF WBC: CPT | Performed by: FAMILY MEDICINE

## 2018-07-28 PROCEDURE — 94760 N-INVAS EAR/PLS OXIMETRY 1: CPT

## 2018-07-28 RX ADMIN — SENNOSIDES AND DOCUSATE SODIUM 1 TABLET: 8.6; 5 TABLET ORAL at 21:26

## 2018-07-28 RX ADMIN — ACETAMINOPHEN 650 MG: 325 TABLET ORAL at 13:05

## 2018-07-28 RX ADMIN — DOXYCYCLINE HYCLATE 100 MG: 100 TABLET, COATED ORAL at 20:24

## 2018-07-28 RX ADMIN — ACETAMINOPHEN 650 MG: 325 TABLET ORAL at 21:50

## 2018-07-28 RX ADMIN — SENNOSIDES 25.8 MG: 8.6 TABLET, FILM COATED ORAL at 21:27

## 2018-07-28 RX ADMIN — ACETAMINOPHEN 650 MG: 325 TABLET ORAL at 03:26

## 2018-07-28 RX ADMIN — SENNOSIDES AND DOCUSATE SODIUM 1 TABLET: 8.6; 5 TABLET ORAL at 09:38

## 2018-07-28 RX ADMIN — PIPERACILLIN SODIUM AND TAZOBACTAM SODIUM 3.38 G: 3; .375 INJECTION, POWDER, LYOPHILIZED, FOR SOLUTION INTRAVENOUS at 20:23

## 2018-07-28 RX ADMIN — PIPERACILLIN SODIUM AND TAZOBACTAM SODIUM 3.38 G: 3; .375 INJECTION, POWDER, LYOPHILIZED, FOR SOLUTION INTRAVENOUS at 03:25

## 2018-07-28 RX ADMIN — PIPERACILLIN SODIUM AND TAZOBACTAM SODIUM 3.38 G: 3; .375 INJECTION, POWDER, LYOPHILIZED, FOR SOLUTION INTRAVENOUS at 12:59

## 2018-07-28 RX ADMIN — PANTOPRAZOLE SODIUM 40 MG: 40 TABLET, DELAYED RELEASE ORAL at 06:58

## 2018-07-28 RX ADMIN — DOXYCYCLINE HYCLATE 100 MG: 100 TABLET, COATED ORAL at 09:38

## 2018-07-28 NOTE — PROGRESS NOTES
Daily Progress Note: 7/28/2018 Chyrl Pang Gilford Stacks, MD 
 
Assessment/Plan:  
Sepsis POA due to below:Fever (7/13/2018)/  Leukocytosis (7/14/2018)/  SIRS (systemic inflammatory response syndrome) ([facility] [address] (7/14/2018): in the setting of immunosuppression.  
--broad spectrum abx - pip-tazo- to complete 7/30 
--WBC improving Rhona-rectal abscess (7/14/2018): left side. Likely source of fever. IV antibiotics as above. S/p I+D on 7/14; wound cultures pending --per surgery --pelvic CT did not show pelvic abscess- repeat CT neg 
--continue catheter 
--Repeat I&D 7/25/18 
 
 Myelofibrosis ([facility] [address] (7/14/2018)/ Anemia/Thrombocytopenia ([facility] [address] (7/14/2018): follows up at [facility]. Hgb up to 8s after 2 units of PRBCs 7/14 
--heme following 
  
Hypokalemia (7/14/2018): likely from vomiting. Replete and follow K+ 
  
Nausea and vomiting (7/14/2018): likely from infection as above. resolved Constipation --Stool softeners daily --Stop Miralax as this has not been effective in the past 
  
Code Status:  Full Problem List: 
Problem List as of 7/28/2018  Date Reviewed: 7/14/2018 Codes Class Noted - Resolved Myelofibrosis ([facility] [address] ICD-10-CM: F88.74 ICD-9-CM: 289.83  7/14/2018 - Present Thrombocytopenia ([facility] [address] ICD-10-CM: D69.6 ICD-9-CM: 287.5  7/14/2018 - Present Hypokalemia ICD-10-CM: E87.6 ICD-9-CM: 276.8  7/14/2018 - Present Leukocytosis ICD-10-CM: F08.361 ICD-9-CM: 288.60  7/14/2018 - Present Nausea and vomiting ICD-10-CM: R11.2 ICD-9-CM: 787.01  7/14/2018 - Present SIRS (systemic inflammatory response syndrome) (HCC) ICD-10-CM: R65.10 ICD-9-CM: 995.90  7/14/2018 - Present Rhona-rectal abscess ICD-10-CM: K61.1 ICD-9-CM: 943  7/14/2018 - Present * (Principal)Fever ICD-10-CM: R50.9 ICD-9-CM: 780.60  7/13/2018 - Present Subjective:  
Ms. Rut Paniagua is a 75 y.o. female who is being admitted for Fever. Ms. Rut Paniagua presented to our Emergency Department today complaining of intermittent fever and chills associated with generalized weakness. She has also been having nausea and vomiting but denies any headaches, flu like or respiratory symptoms, No abdominal discomfort or diarrhea. She has not has any urinary symptoms. No overt focal symptoms. She does have a Hx of myelofibrosis and follows up at [facility]. Work up thus far has been unremarkable. In light of her immunosuppression, she will be admitted for further management. [Dr Edgar Rowe 
  
7/14: Pt found to have perirectal abscess. Afebrile since 5AM.  Still nauseated. NPO for I+D in OR today with Dr Brionna Almazan. 7/15: continues to be nauseated. Labs pending this AM.  Surgeon doesn't feel it is much better despite I+D yesterday. Pt says pain is better controlled with percocet. 7/16/18: K+ 2.6. Repleting with IV. (6 total) WBC a little better. Tmax 98.8.  
 
7/17:  Pt reports she is feeling somewhat better. Dr Claudius Litten from ([address] [place] [address]) called last night (see note from last night) and wanted Mario 2 inhibitor restarted at 1/2 dose and weaned off from there. WBC remains 15K. K+ back down - replacing. Tmax 101.9. Tnow 98.6. Diuresed about a liter overnight. Checking Mag also. 7/18:  Reports \"starting to feel better. \"  Little pain from surg site. Tmax 99. K+ 3.1 - cont to replace. WBC remains at 15K. LFTs sl up - cont to monitor. 7/19/18: Less pain. Finally had a BM and feels better. WBC still up. Tmax 99.7 7/20:  She reports she feels better but remains very weak. Cont to need IV antibiotics. WBC up to 20K. Tmax 99. K+ normalized. LFTs better. 7/21:  Tmax 99. WBC up again. Will repeat CT of the pelvis as her WBC is higher. She is having less pain. 7/22/18: Feeling pretty well. WBC is still high but repeat CT was neg for abscess. Will ask surgery to see again tomorrow. 7/23/18: Continues to grad feel better and wants to go home today.  Will ask surgery to see again and if nothing else needs to be done then home later today. She wants to leave corcoran in place as urine irritates her abscess site. WBC elevated but discussed with Heme-Onc and may be due to the Myelofibrosis but she is to follow up with Dr Claudius Litten at [facility] later this wk or early next wk. Will discuss with ID regarding outpt antibiotics. 18: Feeling a bit better today. For OR today for repeat I&D. WBC 22.  
 
18: For OR today. No complaints. WBC 23. She is NPO 
 
18: Went back to the OR last night. Having some pain but tolerable. WBC up. Tmax 99.5 18: WBC is better at 13.9. Hb down to 7.9. Tmax 99.2. Feeling pretty well.  
 
18: WBC still coming down. Hb 8.1. Afebrile. Little pain. Will complete IV antibiotic regimen on Monday. Has been cleared by surgery. Hopefully can go home on Tues. Review of Systems: A comprehensive review of systems was negative except for that written in the HPI. Objective:  
Physical Exam:  
 
Visit Vitals  /69  Pulse 78  Temp 98.7 °F (37.1 °C)  Resp 15  Ht 5' 6\" (1.676 m)  Wt 140 lb 10.5 oz (63.8 kg)  SpO2 96%  BMI 22.7 kg/m2 O2 Flow Rate (L/min): 2 l/min O2 Device: Room air Temp (24hrs), Av.5 °F (36.9 °C), Min:97.9 °F (36.6 °C), Max:99 °F (37.2 °C) 
      1901 -  0700 In: 300 [I.V.:300] Out: 3350 [RSQO] General:  Alert, cooperative, no distress, appears stated age Head:  Normocephalic, without obvious abnormality, atraumatic. Eyes:  Conjunctivae/corneas clear. PERRL, EOMs intact. Nose: Nares normal. Septum midline. Throat: Lips, mucosa, and tongue normal  
Neck: Supple, symmetrical, trachea midline, no adenopathy, thyroid: no enlargement/tenderness/nodules, no carotid bruit and no JVD. Back:   Symmetric, no curvature. ROM normal. No CVA tenderness. Lungs:   Clear to auscultation Chest wall:  No tenderness or deformity.   
Heart:  Regular rate and rhythm, S1, S2 normal, 1/9 ASHA, click, rub or gallop. Abdomen:   Soft, non-tender. Bowel sounds normal. No masses,  No organomegaly. : corcoran patent, labia with packing present Extremities: Extremities normal, atraumatic, no cyanosis or edema. No calf tenderness or cords. Pulses: 2+ and symmetric all extremities. Skin: Skin color, texture, turgor normal. No rashes or lesions Neurologic: CNII-XII intact. Alert and oriented X 3. Fine motor of hands and fingers normal.   equal.  Gait not tested at this time. Sensation grossly normal to touch. Gross motor of extremities normal.    
 
Data Review:  
   
Recent Days: 
Recent Labs  
   07/28/18 0256 07/27/18 0550  07/26/18 0254 WBC  12.3*  13.9*  22.5* HGB  8.1*  7.9*  9.1*  
HCT  25.6*  24.9*  29.6* PLT  71*  81*  111* Recent Labs  
   07/28/18 0256 07/27/18 0550  07/26/18 0254 NA   --   137   --   
K   --   4.0   --   
CL   --   102   --   
CO2   --   28   --   
GLU   --   94   --   
BUN   --   18   --   
CREA   --   0.92   --   
CA   --   8.5   --   
MG  2.0  1.9  2.0 ALB   --   2.9*   --   
SGOT   --   31   --   
ALT   --   24   -- No results for input(s): PH, PCO2, PO2, HCO3, FIO2 in the last 72 hours. 24 Hour Results: 
Recent Results (from the past 24 hour(s)) MAGNESIUM Collection Time: 07/28/18  2:56 AM  
Result Value Ref Range Magnesium 2.0 1.6 - 2.4 mg/dL CBC WITH AUTOMATED DIFF Collection Time: 07/28/18  2:56 AM  
Result Value Ref Range WBC 12.3 (H) 3.6 - 11.0 K/uL  
 RBC 2.72 (L) 3.80 - 5.20 M/uL HGB 8.1 (L) 11.5 - 16.0 g/dL HCT 25.6 (L) 35.0 - 47.0 % MCV 94.1 80.0 - 99.0 FL  
 MCH 29.8 26.0 - 34.0 PG  
 MCHC 31.6 30.0 - 36.5 g/dL  
 RDW 18.5 (H) 11.5 - 14.5 % PLATELET 71 (L) 479 - 400 K/uL NRBC 3.4 (H) 0  WBC ABSOLUTE NRBC 0.42 (H) 0.00 - 0.01 K/uL NEUTROPHILS 56 32 - 75 % BAND NEUTROPHILS 2 0 - 6 % LYMPHOCYTES 8 (L) 12 - 49 % MONOCYTES 28 (H) 5 - 13 %  EOSINOPHILS 0 0 - 7 % BASOPHILS 0 0 - 1 % METAMYELOCYTES 4 (H) 0 % MYELOCYTES 2 (H) 0 % IMMATURE GRANULOCYTES 0 %  
 ABS. NEUTROPHILS 7.1 1.8 - 8.0 K/UL  
 ABS. LYMPHOCYTES 1.0 0.8 - 3.5 K/UL  
 ABS. MONOCYTES 3.4 (H) 0.0 - 1.0 K/UL  
 ABS. EOSINOPHILS 0.0 0.0 - 0.4 K/UL  
 ABS. BASOPHILS 0.0 0.0 - 0.1 K/UL  
 ABS. IMM. GRANS. 0.0 K/UL  
 DF MANUAL    
 RBC COMMENTS SCHISTOCYTES 1+ RBC COMMENTS ANISOCYTOSIS 
1+ Medications reviewed Current Facility-Administered Medications Medication Dose Route Frequency  piperacillin-tazobactam (ZOSYN) 3.375 g in 0.9% sodium chloride (MBP/ADV) 100 mL  3.375 g IntraVENous Q8H  
 lidocaine (PF) (XYLOCAINE) 10 mg/mL (1 %) injection 0.1 mL  0.1 mL SubCUTAneous PRN  
 sodium chloride (NS) flush 5-10 mL  5-10 mL IntraVENous Q8H  
 sodium chloride (NS) flush 5-10 mL  5-10 mL IntraVENous PRN  
 sodium chloride (NS) flush 5-10 mL  5-10 mL IntraVENous Q8H  
 sodium chloride (NS) flush 5-10 mL  5-10 mL IntraVENous PRN  
 lidocaine (PF) (XYLOCAINE) 10 mg/mL (1 %) injection 0.1 mL  0.1 mL SubCUTAneous PRN  
 sodium chloride (NS) flush 5-10 mL  5-10 mL IntraVENous Q8H  
 sodium chloride (NS) flush 5-10 mL  5-10 mL IntraVENous PRN  
 naloxone (NARCAN) injection 0.04 mg  0.04 mg IntraVENous Multiple  flumazenil (ROMAZICON) 0.1 mg/mL injection 0.2 mg  0.2 mg IntraVENous Multiple  sodium chloride (NS) flush 5-10 mL  5-10 mL IntraVENous PRN  
 doxycycline (VIBRA-TABS) tablet 100 mg  100 mg Oral Q12H  
 senna-docusate (PERICOLACE) 8.6-50 mg per tablet 1 Tab  1 Tab Oral BID  senna (SENOKOT) tablet 43 mg  5 Tab Oral QHS  ruxolitinib tab 15 mg  (Patient Supplied)  15 mg Oral DAILY  aluminum-magnesium hydroxide (MAALOX) oral suspension 30 mL  30 mL Oral QID PRN  promethazine (PHENERGAN) 25 mg in NS IVPB  25 mg IntraVENous Q8H PRN  pantoprazole (PROTONIX) tablet 40 mg  40 mg Oral ACB  oxyCODONE-acetaminophen (PERCOCET) 5-325 mg per tablet 1 Tab  1 Tab Oral Q4H PRN  
 HYDROmorphone (DILAUDID) tablet 1 mg  1 mg Oral Q4H PRN  
 sodium chloride (NS) flush 5-10 mL  5-10 mL IntraVENous PRN  
 sodium chloride (NS) flush 5-10 mL  5-10 mL IntraVENous Q8H  
 sodium chloride (NS) flush 5-10 mL  5-10 mL IntraVENous PRN  
 acetaminophen (TYLENOL) tablet 650 mg  650 mg Oral Q4H PRN  
 ondansetron (ZOFRAN) injection 4 mg  4 mg IntraVENous Q4H PRN  
 0.9% sodium chloride infusion 250 mL  250 mL IntraVENous PRN Care Plan discussed with: Patient Total time spent with patient: 30 minutes.  
 
Monique Aldridge MD

## 2018-07-28 NOTE — PROGRESS NOTES
Bedside and Verbal shift change report given to [address] [place] [address] Street (oncoming nurse) by Milly Chauhan RN (offgoing nurse). Report included the following information SBAR, Kardex, OR Summary and Procedure Summary.

## 2018-07-29 LAB
BASOPHILS # BLD: 0.2 K/UL (ref 0–0.1)
BASOPHILS NFR BLD: 2 % (ref 0–1)
BLASTS NFR BLD MANUAL: 3 %
DIFFERENTIAL METHOD BLD: ABNORMAL
EOSINOPHIL # BLD: 0 K/UL (ref 0–0.4)
EOSINOPHIL NFR BLD: 0 % (ref 0–7)
ERYTHROCYTE [DISTWIDTH] IN BLOOD BY AUTOMATED COUNT: 17.8 % (ref 11.5–14.5)
HCT VFR BLD AUTO: 24.9 % (ref 35–47)
HGB BLD-MCNC: 7.8 G/DL (ref 11.5–16)
IMM GRANULOCYTES # BLD: 0 K/UL
IMM GRANULOCYTES NFR BLD AUTO: 0 %
LYMPHOCYTES # BLD: 1.3 K/UL (ref 0.8–3.5)
LYMPHOCYTES NFR BLD: 11 % (ref 12–49)
MAGNESIUM SERPL-MCNC: 2 MG/DL (ref 1.6–2.4)
MCH RBC QN AUTO: 29.3 PG (ref 26–34)
MCHC RBC AUTO-ENTMCNC: 31.3 G/DL (ref 30–36.5)
MCV RBC AUTO: 93.6 FL (ref 80–99)
METAMYELOCYTES NFR BLD MANUAL: 5 %
MONOCYTES # BLD: 2 K/UL (ref 0–1)
MONOCYTES NFR BLD: 17 % (ref 5–13)
MYELOCYTES NFR BLD MANUAL: 3 %
NEUTS BAND NFR BLD MANUAL: 10 % (ref 0–6)
NEUTS SEG # BLD: 7 K/UL (ref 1.8–8)
NEUTS SEG NFR BLD: 49 % (ref 32–75)
NRBC # BLD: 0.27 K/UL (ref 0–0.01)
NRBC BLD-RTO: 2.3 PER 100 WBC
PLATELET # BLD AUTO: 70 K/UL (ref 150–400)
PLATELET COMMENTS,PCOM: ABNORMAL
RBC # BLD AUTO: 2.66 M/UL (ref 3.8–5.2)
RBC MORPH BLD: ABNORMAL
WBC # BLD AUTO: 11.9 K/UL (ref 3.6–11)

## 2018-07-29 PROCEDURE — 74011250637 HC RX REV CODE- 250/637: Performed by: INTERNAL MEDICINE

## 2018-07-29 PROCEDURE — 74011250636 HC RX REV CODE- 250/636: Performed by: INTERNAL MEDICINE

## 2018-07-29 PROCEDURE — 74011250637 HC RX REV CODE- 250/637: Performed by: FAMILY MEDICINE

## 2018-07-29 PROCEDURE — 36415 COLL VENOUS BLD VENIPUNCTURE: CPT | Performed by: SURGERY

## 2018-07-29 PROCEDURE — 83735 ASSAY OF MAGNESIUM: CPT | Performed by: SURGERY

## 2018-07-29 PROCEDURE — 74011000258 HC RX REV CODE- 258: Performed by: INTERNAL MEDICINE

## 2018-07-29 PROCEDURE — 94760 N-INVAS EAR/PLS OXIMETRY 1: CPT

## 2018-07-29 PROCEDURE — 65270000029 HC RM PRIVATE

## 2018-07-29 PROCEDURE — 85025 COMPLETE CBC W/AUTO DIFF WBC: CPT | Performed by: FAMILY MEDICINE

## 2018-07-29 RX ORDER — FACIAL-BODY WIPES
10 EACH TOPICAL
Status: DISCONTINUED | OUTPATIENT
Start: 2018-07-29 | End: 2018-07-31 | Stop reason: HOSPADM

## 2018-07-29 RX ADMIN — SENNOSIDES AND DOCUSATE SODIUM 1 TABLET: 8.6; 5 TABLET ORAL at 08:44

## 2018-07-29 RX ADMIN — PIPERACILLIN SODIUM AND TAZOBACTAM SODIUM 3.38 G: 3; .375 INJECTION, POWDER, LYOPHILIZED, FOR SOLUTION INTRAVENOUS at 20:32

## 2018-07-29 RX ADMIN — PIPERACILLIN SODIUM AND TAZOBACTAM SODIUM 3.38 G: 3; .375 INJECTION, POWDER, LYOPHILIZED, FOR SOLUTION INTRAVENOUS at 04:22

## 2018-07-29 RX ADMIN — PANTOPRAZOLE SODIUM 40 MG: 40 TABLET, DELAYED RELEASE ORAL at 07:03

## 2018-07-29 RX ADMIN — ACETAMINOPHEN 650 MG: 325 TABLET ORAL at 13:47

## 2018-07-29 RX ADMIN — ACETAMINOPHEN 650 MG: 325 TABLET ORAL at 22:26

## 2018-07-29 RX ADMIN — ACETAMINOPHEN 650 MG: 325 TABLET ORAL at 02:04

## 2018-07-29 RX ADMIN — PIPERACILLIN SODIUM AND TAZOBACTAM SODIUM 3.38 G: 3; .375 INJECTION, POWDER, LYOPHILIZED, FOR SOLUTION INTRAVENOUS at 11:57

## 2018-07-29 RX ADMIN — SENNOSIDES AND DOCUSATE SODIUM 1 TABLET: 8.6; 5 TABLET ORAL at 20:34

## 2018-07-29 RX ADMIN — DOXYCYCLINE HYCLATE 100 MG: 100 TABLET, COATED ORAL at 20:34

## 2018-07-29 RX ADMIN — BISACODYL 10 MG: 10 SUPPOSITORY RECTAL at 09:27

## 2018-07-29 RX ADMIN — DOXYCYCLINE HYCLATE 100 MG: 100 TABLET, COATED ORAL at 08:44

## 2018-07-29 RX ADMIN — SENNOSIDES 43 MG: 8.6 TABLET, FILM COATED ORAL at 21:58

## 2018-07-29 NOTE — PROGRESS NOTES
Daily Progress Note: 7/29/2018 [place] Pop[place]oa Pollock MD 
 
Assessment/Plan:  
Sepsis POA due to below:Fever (7/13/2018)/  Leukocytosis (7/14/2018)/  SIRS (systemic inflammatory response syndrome) ([facility] (7/14/2018): in the setting of immunosuppression.  
--broad spectrum abx - pip-tazo and Doxy- to complete 7/30 
--WBC improving Rhona-rectal abscess (7/14/2018): left side. Likely source of fever. IV antibiotics as above. S/p I+D on 7/14; wound cultures pending --per surgery --pelvic CT did not show pelvic abscess- repeat CT neg 
--continue catheter 
--Repeat I&D 7/25/18 
 
 Myelofibrosis ([facility] (7/14/2018)/ Anemia/Thrombocytopenia ([facility] (7/14/2018): follows up at [facility]. Hgb up to 8s after 2 units of PRBCs 7/14 
--heme following 
  
Hypokalemia (7/14/2018): likely from vomiting. Replete and follow K+ 
  
Nausea and vomiting (7/14/2018): likely from infection as above. resolved Constipation --Stool softeners daily --Stop Miralax as this has not been effective in the past 
--Add supp 
  
Code Status:  Full Problem List: 
Problem List as of 7/29/2018  Date Reviewed: 7/14/2018 Codes Class Noted - Resolved Myelofibrosis ([facility] ICD-10-CM: T40.60 ICD-9-CM: 289.83  7/14/2018 - Present Thrombocytopenia ([facility] ICD-10-CM: D69.6 ICD-9-CM: 287.5  7/14/2018 - Present Hypokalemia ICD-10-CM: E87.6 ICD-9-CM: 276.8  7/14/2018 - Present Leukocytosis ICD-10-CM: W36.459 ICD-9-CM: 288.60  7/14/2018 - Present Nausea and vomiting ICD-10-CM: R11.2 ICD-9-CM: 787.01  7/14/2018 - Present SIRS (systemic inflammatory response syndrome) (HCC) ICD-10-CM: R65.10 ICD-9-CM: 995.90  7/14/2018 - Present Rhona-rectal abscess ICD-10-CM: K61.1 ICD-9-CM: 067  7/14/2018 - Present * (Principal)Fever ICD-10-CM: R50.9 ICD-9-CM: 780.60  7/13/2018 - Present Subjective:  
Ms. Lalito Fritz is a 75 y.o. female who is being admitted for Fever. Ms. Lalito Fritz presented to our Emergency Department today complaining of intermittent fever and chills associated with generalized weakness. She has also been having nausea and vomiting but denies any headaches, flu like or respiratory symptoms, No abdominal discomfort or diarrhea. She has not has any urinary symptoms. No overt focal symptoms. She does have a Hx of myelofibrosis and follows up at [facility]. Work up thus far has been unremarkable. In light of her immunosuppression, she will be admitted for further management. [Dr David Puckett 
  
7/14: Pt found to have perirectal abscess. Afebrile since 5AM.  Still nauseated. NPO for I+D in OR today with Dr Latosha Ramsey. 7/15: continues to be nauseated. Labs pending this AM.  Surgeon doesn't feel it is much better despite I+D yesterday. Pt says pain is better controlled with percocet. 7/16/18: K+ 2.6. Repleting with IV. (6 total) WBC a little better. Tmax 98.8.  
 
7/17:  Pt reports she is feeling somewhat better. Dr Juan Sharpe from ([address] [place] [address]) called last night (see note from last night) and wanted Mario 2 inhibitor restarted at 1/2 dose and weaned off from there. WBC remains 15K. K+ back down - replacing. Tmax 101.9. Tnow 98.6. Diuresed about a liter overnight. Checking Mag also. 7/18:  Reports \"starting to feel better. \"  Little pain from surg site. Tmax 99. K+ 3.1 - cont to replace. WBC remains at 15K. LFTs sl up - cont to monitor. 7/19/18: Less pain. Finally had a BM and feels better. WBC still up. Tmax 99.7 7/20:  She reports she feels better but remains very weak. Cont to need IV antibiotics. WBC up to 20K. Tmax 99. K+ normalized. LFTs better. 7/21:  Tmax 99. WBC up again. Will repeat CT of the pelvis as her WBC is higher. She is having less pain. 7/22/18: Feeling pretty well. WBC is still high but repeat CT was neg for abscess. Will ask surgery to see again tomorrow.   
 
7/23/18: Continues to grad feel better and wants to go home today. Will ask surgery to see again and if nothing else needs to be done then home later today. She wants to leave corcoran in place as urine irritates her abscess site. WBC elevated but discussed with Heme-Onc and may be due to the Myelofibrosis but she is to follow up with Dr Hetal Ward at [facility] later this wk or early next wk. Will discuss with ID regarding outpt antibiotics. 18: Feeling a bit better today. For OR today for repeat I&D. WBC 22.  
 
18: For OR today. No complaints. WBC 23. She is NPO 
 
18: Went back to the OR last night. Having some pain but tolerable. WBC up. Tmax 99.5 18: WBC is better at 13.9. Hb down to 7.9. Tmax 99.2. Feeling pretty well.  
 
18: WBC still coming down. Hb 8.1. Afebrile. Little pain. Will complete IV antibiotic regimen on Monday. Has been cleared by surgery. Hopefully can go home on Tues. :  Hb 7.8 - watching. She is having more constipation and has not had a BM. Has been taking senokot without relief. Having more gas. Review of Systems: A comprehensive review of systems was negative except for that written in the HPI. Objective:  
Physical Exam:  
 
Visit Vitals  /83 (BP 1 Location: Right arm, BP Patient Position: At rest)  Pulse 84  Temp 97.8 °F (36.6 °C)  Resp 18  Ht 5' 6\" (1.676 m)  Wt 63.8 kg (140 lb 10.5 oz)  SpO2 98%  BMI 22.7 kg/m2 O2 Flow Rate (L/min): 2 l/min O2 Device: Room air Temp (24hrs), Av.5 °F (36.9 °C), Min:97.8 °F (36.6 °C), Max:98.9 °F (37.2 °C) 
       1901 -  0700 In: -  
Out: 2425 [Urine:2425] General:  Alert, cooperative, no distress, appears stated age Head:  Normocephalic, without obvious abnormality, atraumatic. Eyes:  Conjunctivae/corneas clear. PERRL, EOMs intact. Nose: Nares normal. Septum midline.    
Throat: Lips, mucosa, and tongue normal  
Neck: Supple, symmetrical, trachea midline, no adenopathy, thyroid: no enlargement/tenderness/nodules, no carotid bruit and no JVD. Back:   Symmetric, no curvature. ROM normal. No CVA tenderness. Lungs:   Clear to auscultation Chest wall:  No tenderness or deformity. Heart:  Regular rate and rhythm, S1, S2 normal, 1/9 ASHA, click, rub or gallop. Abdomen:   Soft, non-tender. Bowel sounds normal. No masses,  No organomegaly. : corcoran patent, labia with packing present Extremities: Extremities normal, atraumatic, no cyanosis or edema. No calf tenderness or cords. Pulses: 2+ and symmetric all extremities. Skin: Skin color, texture, turgor normal. No rashes or lesions Neurologic: CNII-XII intact. Alert and oriented X 3. Fine motor of hands and fingers normal.   equal.  Gait not tested at this time. Sensation grossly normal to touch. Gross motor of extremities normal.    
 
Data Review:  
   
Recent Days: 
Recent Labs  
   07/29/18 0033 07/28/18 0256  07/27/18 
 0550 WBC  11.9*  12.3*  13.9* HGB  7.8*  8.1*  7.9*  
HCT  24.9*  25.6*  24.9*  
PLT  70*  71*  81* Recent Labs  
   07/29/18 0033 07/28/18 
 0256  07/27/18 
 0550 NA   --    --   137 K   --    --   4.0  
CL   --    --   102 CO2   --    --   28  
GLU   --    --   94 BUN   --    --   18  
CREA   --    --   0.92  
CA   --    --   8.5 MG  2.0  2.0  1.9 ALB   --    --   2.9*  
SGOT   --    --   31 ALT   --    --   24 No results for input(s): PH, PCO2, PO2, HCO3, FIO2 in the last 72 hours. 24 Hour Results: 
Recent Results (from the past 24 hour(s)) MAGNESIUM Collection Time: 07/29/18 12:33 AM  
Result Value Ref Range Magnesium 2.0 1.6 - 2.4 mg/dL CBC WITH AUTOMATED DIFF Collection Time: 07/29/18 12:33 AM  
Result Value Ref Range WBC 11.9 (H) 3.6 - 11.0 K/uL  
 RBC 2.66 (L) 3.80 - 5.20 M/uL HGB 7.8 (L) 11.5 - 16.0 g/dL HCT 24.9 (L) 35.0 - 47.0 % MCV 93.6 80.0 - 99.0 FL  
 MCH 29.3 26.0 - 34.0 PG  
 MCHC 31.3 30.0 - 36.5 g/dL  
 RDW 17.8 (H) 11.5 - 14.5 %  PLATELET 70 (L) 931 - 400 K/uL  
 NRBC 2.3 (H) 0  WBC ABSOLUTE NRBC 0.27 (H) 0.00 - 0.01 K/uL NEUTROPHILS 49 32 - 75 % BAND NEUTROPHILS 10 (H) 0 - 6 % LYMPHOCYTES 11 (L) 12 - 49 % MONOCYTES 17 (H) 5 - 13 % EOSINOPHILS 0 0 - 7 % BASOPHILS 2 (H) 0 - 1 % METAMYELOCYTES 5 (H) 0 % MYELOCYTES 3 (H) 0 % BLASTS 3 (H) 0 % IMMATURE GRANULOCYTES 0 %  
 ABS. NEUTROPHILS 7.0 1.8 - 8.0 K/UL  
 ABS. LYMPHOCYTES 1.3 0.8 - 3.5 K/UL  
 ABS. MONOCYTES 2.0 (H) 0.0 - 1.0 K/UL  
 ABS. EOSINOPHILS 0.0 0.0 - 0.4 K/UL  
 ABS. BASOPHILS 0.2 (H) 0.0 - 0.1 K/UL  
 ABS. IMM. GRANS. 0.0 K/UL  
 DF MANUAL PLATELET COMMENTS LARGE PLATELETS    
 RBC COMMENTS ANISOCYTOSIS 
1+ Medications reviewed Current Facility-Administered Medications Medication Dose Route Frequency  piperacillin-tazobactam (ZOSYN) 3.375 g in 0.9% sodium chloride (MBP/ADV) 100 mL  3.375 g IntraVENous Q8H  
 lidocaine (PF) (XYLOCAINE) 10 mg/mL (1 %) injection 0.1 mL  0.1 mL SubCUTAneous PRN  
 sodium chloride (NS) flush 5-10 mL  5-10 mL IntraVENous Q8H  
 sodium chloride (NS) flush 5-10 mL  5-10 mL IntraVENous PRN  
 sodium chloride (NS) flush 5-10 mL  5-10 mL IntraVENous Q8H  
 sodium chloride (NS) flush 5-10 mL  5-10 mL IntraVENous PRN  
 lidocaine (PF) (XYLOCAINE) 10 mg/mL (1 %) injection 0.1 mL  0.1 mL SubCUTAneous PRN  
 sodium chloride (NS) flush 5-10 mL  5-10 mL IntraVENous Q8H  
 sodium chloride (NS) flush 5-10 mL  5-10 mL IntraVENous PRN  
 naloxone (NARCAN) injection 0.04 mg  0.04 mg IntraVENous Multiple  flumazenil (ROMAZICON) 0.1 mg/mL injection 0.2 mg  0.2 mg IntraVENous Multiple  sodium chloride (NS) flush 5-10 mL  5-10 mL IntraVENous PRN  
 doxycycline (VIBRA-TABS) tablet 100 mg  100 mg Oral Q12H  
 senna-docusate (PERICOLACE) 8.6-50 mg per tablet 1 Tab  1 Tab Oral BID  senna (SENOKOT) tablet 43 mg  5 Tab Oral QHS  ruxolitinib tab 15 mg  (Patient Supplied)  15 mg Oral DAILY  aluminum-magnesium hydroxide (MAALOX) oral suspension 30 mL  30 mL Oral QID PRN  promethazine (PHENERGAN) 25 mg in NS IVPB  25 mg IntraVENous Q8H PRN  pantoprazole (PROTONIX) tablet 40 mg  40 mg Oral ACB  oxyCODONE-acetaminophen (PERCOCET) 5-325 mg per tablet 1 Tab  1 Tab Oral Q4H PRN  
 HYDROmorphone (DILAUDID) tablet 1 mg  1 mg Oral Q4H PRN  
 sodium chloride (NS) flush 5-10 mL  5-10 mL IntraVENous PRN  
 sodium chloride (NS) flush 5-10 mL  5-10 mL IntraVENous Q8H  
 sodium chloride (NS) flush 5-10 mL  5-10 mL IntraVENous PRN  
 acetaminophen (TYLENOL) tablet 650 mg  650 mg Oral Q4H PRN  
 ondansetron (ZOFRAN) injection 4 mg  4 mg IntraVENous Q4H PRN  
 0.9% sodium chloride infusion 250 mL  250 mL IntraVENous PRN Care Plan discussed with: Patient Total time spent with patient: 30 minutes.  
 
Bethanie García MD

## 2018-07-30 LAB
ALBUMIN SERPL-MCNC: 3.1 G/DL (ref 3.5–5)
ALBUMIN/GLOB SERPL: 0.8 {RATIO} (ref 1.1–2.2)
ALP SERPL-CCNC: 103 U/L (ref 45–117)
ALT SERPL-CCNC: 22 U/L (ref 12–78)
ANION GAP SERPL CALC-SCNC: 9 MMOL/L (ref 5–15)
AST SERPL-CCNC: 31 U/L (ref 15–37)
BASOPHILS # BLD: 0.4 K/UL (ref 0–0.1)
BASOPHILS NFR BLD: 4 % (ref 0–1)
BILIRUB SERPL-MCNC: 0.8 MG/DL (ref 0.2–1)
BLASTS NFR BLD MANUAL: 2 %
BUN SERPL-MCNC: 9 MG/DL (ref 6–20)
BUN/CREAT SERPL: 10 (ref 12–20)
CALCIUM SERPL-MCNC: 8.4 MG/DL (ref 8.5–10.1)
CHLORIDE SERPL-SCNC: 104 MMOL/L (ref 97–108)
CO2 SERPL-SCNC: 26 MMOL/L (ref 21–32)
CREAT SERPL-MCNC: 0.88 MG/DL (ref 0.55–1.02)
DIFFERENTIAL METHOD BLD: ABNORMAL
EOSINOPHIL # BLD: 0 K/UL (ref 0–0.4)
EOSINOPHIL NFR BLD: 0 % (ref 0–7)
ERYTHROCYTE [DISTWIDTH] IN BLOOD BY AUTOMATED COUNT: 17.9 % (ref 11.5–14.5)
GLOBULIN SER CALC-MCNC: 3.8 G/DL (ref 2–4)
GLUCOSE SERPL-MCNC: 90 MG/DL (ref 65–100)
HCT VFR BLD AUTO: 24.8 % (ref 35–47)
HGB BLD-MCNC: 7.9 G/DL (ref 11.5–16)
IMM GRANULOCYTES # BLD: 0 K/UL
IMM GRANULOCYTES NFR BLD AUTO: 0 %
LYMPHOCYTES # BLD: 0.9 K/UL (ref 0.8–3.5)
LYMPHOCYTES NFR BLD: 8 % (ref 12–49)
MAGNESIUM SERPL-MCNC: 2.2 MG/DL (ref 1.6–2.4)
MCH RBC QN AUTO: 29.6 PG (ref 26–34)
MCHC RBC AUTO-ENTMCNC: 31.9 G/DL (ref 30–36.5)
MCV RBC AUTO: 92.9 FL (ref 80–99)
METAMYELOCYTES NFR BLD MANUAL: 4 %
MONOCYTES # BLD: 2.4 K/UL (ref 0–1)
MONOCYTES NFR BLD: 21 % (ref 5–13)
NEUTS BAND NFR BLD MANUAL: 5 % (ref 0–6)
NEUTS SEG # BLD: 6.7 K/UL (ref 1.8–8)
NEUTS SEG NFR BLD: 55 % (ref 32–75)
NRBC # BLD: 0.29 K/UL (ref 0–0.01)
NRBC BLD-RTO: 2.6 PER 100 WBC
PLATELET # BLD AUTO: 73 K/UL (ref 150–400)
PMV BLD AUTO: 11.7 FL (ref 8.9–12.9)
POTASSIUM SERPL-SCNC: 3.8 MMOL/L (ref 3.5–5.1)
PROMYELOCYTES NFR BLD MANUAL: 1 %
PROT SERPL-MCNC: 6.9 G/DL (ref 6.4–8.2)
RBC # BLD AUTO: 2.67 M/UL (ref 3.8–5.2)
RBC MORPH BLD: ABNORMAL
RBC MORPH BLD: ABNORMAL
SODIUM SERPL-SCNC: 139 MMOL/L (ref 136–145)
WBC # BLD AUTO: 11.2 K/UL (ref 3.6–11)

## 2018-07-30 PROCEDURE — 65270000029 HC RM PRIVATE

## 2018-07-30 PROCEDURE — 74011250637 HC RX REV CODE- 250/637: Performed by: INTERNAL MEDICINE

## 2018-07-30 PROCEDURE — 80053 COMPREHEN METABOLIC PANEL: CPT | Performed by: FAMILY MEDICINE

## 2018-07-30 PROCEDURE — 36415 COLL VENOUS BLD VENIPUNCTURE: CPT | Performed by: FAMILY MEDICINE

## 2018-07-30 PROCEDURE — 77030018836 HC SOL IRR NACL ICUM -A

## 2018-07-30 PROCEDURE — 74011250637 HC RX REV CODE- 250/637: Performed by: FAMILY MEDICINE

## 2018-07-30 PROCEDURE — 85025 COMPLETE CBC W/AUTO DIFF WBC: CPT | Performed by: FAMILY MEDICINE

## 2018-07-30 PROCEDURE — 74011250636 HC RX REV CODE- 250/636: Performed by: INTERNAL MEDICINE

## 2018-07-30 PROCEDURE — 83735 ASSAY OF MAGNESIUM: CPT | Performed by: FAMILY MEDICINE

## 2018-07-30 PROCEDURE — 74011000258 HC RX REV CODE- 258: Performed by: INTERNAL MEDICINE

## 2018-07-30 PROCEDURE — 94760 N-INVAS EAR/PLS OXIMETRY 1: CPT

## 2018-07-30 RX ORDER — AMOXICILLIN AND CLAVULANATE POTASSIUM 875; 125 MG/1; MG/1
1 TABLET, FILM COATED ORAL EVERY 12 HOURS
Status: DISCONTINUED | OUTPATIENT
Start: 2018-07-30 | End: 2018-07-31 | Stop reason: HOSPADM

## 2018-07-30 RX ORDER — POLYETHYLENE GLYCOL 3350 17 G/17G
17 POWDER, FOR SOLUTION ORAL DAILY PRN
Status: DISCONTINUED | OUTPATIENT
Start: 2018-07-30 | End: 2018-07-31 | Stop reason: HOSPADM

## 2018-07-30 RX ORDER — AMOXICILLIN AND CLAVULANATE POTASSIUM 500; 125 MG/1; MG/1
1 TABLET, FILM COATED ORAL EVERY 12 HOURS
Status: DISCONTINUED | OUTPATIENT
Start: 2018-07-30 | End: 2018-07-30 | Stop reason: DRUGHIGH

## 2018-07-30 RX ADMIN — Medication 10 ML: at 15:22

## 2018-07-30 RX ADMIN — AMOXICILLIN AND CLAVULANATE POTASSIUM 1 TABLET: 875; 125 TABLET, FILM COATED ORAL at 08:30

## 2018-07-30 RX ADMIN — PIPERACILLIN SODIUM AND TAZOBACTAM SODIUM 3.38 G: 3; .375 INJECTION, POWDER, LYOPHILIZED, FOR SOLUTION INTRAVENOUS at 03:17

## 2018-07-30 RX ADMIN — SENNOSIDES AND DOCUSATE SODIUM 1 TABLET: 8.6; 5 TABLET ORAL at 20:35

## 2018-07-30 RX ADMIN — AMOXICILLIN AND CLAVULANATE POTASSIUM 1 TABLET: 875; 125 TABLET, FILM COATED ORAL at 20:34

## 2018-07-30 RX ADMIN — SENNOSIDES AND DOCUSATE SODIUM 1 TABLET: 8.6; 5 TABLET ORAL at 07:59

## 2018-07-30 RX ADMIN — DOXYCYCLINE HYCLATE 100 MG: 100 TABLET, COATED ORAL at 08:30

## 2018-07-30 RX ADMIN — SENNOSIDES 17.2 MG: 8.6 TABLET, FILM COATED ORAL at 20:35

## 2018-07-30 RX ADMIN — ACETAMINOPHEN 650 MG: 325 TABLET ORAL at 03:17

## 2018-07-30 RX ADMIN — PANTOPRAZOLE SODIUM 40 MG: 40 TABLET, DELAYED RELEASE ORAL at 07:59

## 2018-07-30 RX ADMIN — ACETAMINOPHEN 650 MG: 325 TABLET ORAL at 20:34

## 2018-07-30 RX ADMIN — DOXYCYCLINE HYCLATE 100 MG: 100 TABLET, COATED ORAL at 20:34

## 2018-07-30 NOTE — PROGRESS NOTES
Bedside and Verbal shift change report given to Deepa Meza RN (oncoming nurse) by WIL Carlisle (offgoing nurse). Report given with SBAR, Kardex, OR Summary, Intake/Output, MAR and Recent Results.

## 2018-07-30 NOTE — PROGRESS NOTES
Physical Therapy 65 Contacted by RN, pt ready to participate with physical therapy. ~ 30min later,upon entering patient's room, pt reported she had wound packing had come out and needed to be replaced. RN notified. 1213 Attempt to work with patient twice this morning. First attempt, pt reported recent BM and requesting PT return later, second attempt pt stood EOB and reported abdominal cramping, slight lightheadedness and requested to return to bed. PT will attempt later this afternoon, time permitting. Hina Milligan

## 2018-07-30 NOTE — PROGRESS NOTES
[address] Dosing Services: Antimicrobial Stewardship Progress Note Pharmacist reviewed antibiotic appropriateness for 75year old , female  for indication of Skin and soft tissue infection Day of Therapy 1 Plan: 
Non-Kinetic Antimicrobial Dosing:  
Current Regimen:  Augmentin 500/125 mg PO every 12 hours Recommendation: Per [facility] Renal Dosing Protocol, adjust to Augmentin 875/125 mg PO every 12 hours for CrCl greater than 50 mL/min Other Antimicrobial 
(not dosed by pharmacist) N/A Cultures 7/14 - Wound - Light E. Coli S to Augmentin - FINAL Serum Creatinine Lab Results Component Value Date/Time Creatinine 0.88 07/30/2018 03:22 AM  
   
Creatinine Clearance Estimated Creatinine Clearance: 52.5 mL/min (based on Cr of 0.88). Temp 98.2 °F (36.8 °C) WBC Lab Results Component Value Date/Time WBC 11.2 (H) 07/30/2018 03:22 AM  
   
H/H Lab Results Component Value Date/Time HGB 7.9 (L) 07/30/2018 03:22 AM  
  
 
Platelets Lab Results Component Value Date/Time  PLATELET 73 (L) 24/55/5185 03:22 AM  
  
 
 
Pharmacist: Signed YAJAIRA SheaD

## 2018-07-30 NOTE — PROGRESS NOTES
Progress Note Patient: Jaden Cisneros MRN: [id number]  SSN: [id number] YOB: 1945  Age: 75 y.o. Sex: female Admit Date: 2018 5 Days Post-Op Procedure:  Procedure(s): PERIRECTAL ABSCESS EXCISION Subjective:  
 
Mrs. Bryant Martinez feels fine. Objective:  
 
Visit Vitals  /67 (BP 1 Location: Right arm, BP Patient Position: At rest)  Pulse 82  Temp 98.2 °F (36.8 °C)  Resp 16  
 Ht 5' 6\" (1.676 m)  Wt 141 lb 8.6 oz (64.2 kg)  SpO2 99%  BMI 22.84 kg/m2 Temp (24hrs), Av.3 °F (36.8 °C), Min:98.1 °F (36.7 °C), Max:98.7 °F (37.1 °C) Physical Exam:   
GENERAL: alert, cooperative, no distress, SKIN: The perianal wound is clean with granulation. There is induration and diminishing erythema, but no tenderness. Lab Review:  
BMP:  
Lab Results Component Value Date/Time  2018 03:22 AM  
 K 3.8 2018 03:22 AM  
  2018 03:22 AM  
 CO2 26 2018 03:22 AM  
 AGAP 9 2018 03:22 AM  
 GLU 90 2018 03:22 AM  
 BUN 9 2018 03:22 AM  
 CREA 0.88 2018 03:22 AM  
 GFRAA >60 2018 03:22 AM  
 GFRNA >60 2018 03:22 AM  
 
CBC:  
Lab Results Component Value Date/Time WBC 11.2 (H) 2018 03:22 AM  
 HGB 7.9 (L) 2018 03:22 AM  
 HCT 24.8 (L) 2018 03:22 AM  
 PLT 73 (L) 2018 03:22 AM  
 
 
Assessment:  
 
Hospital Problems  Date Reviewed: 2018 Codes Class Noted POA Myelofibrosis ([facility] ICD-10-CM: I19.17 ICD-9-CM: 289.83  2018 Yes Thrombocytopenia ([facility] ICD-10-CM: D69.6 ICD-9-CM: 287.5  2018 Yes Hypokalemia ICD-10-CM: E87.6 ICD-9-CM: 276.8  2018 Yes Leukocytosis ICD-10-CM: J25.852 ICD-9-CM: 288.60  2018 Yes Nausea and vomiting ICD-10-CM: R11.2 ICD-9-CM: 787.01  2018 Yes SIRS (systemic inflammatory response syndrome) (HCC) ICD-10-CM: R65.10 ICD-9-CM: 995.90  2018 Yes Rhona-rectal abscess ICD-10-CM: K61.1 ICD-9-CM: 214  7/14/2018 Yes * (Principal)Fever ICD-10-CM: R50.9 ICD-9-CM: 780.60  7/13/2018 Yes Plan/Recommendations/Medical Decision Making:  
 
Continue antibiotics and LWC. OK for D/C. Signed By: Ann-Marie Jung MD   
 July 30, 2018

## 2018-07-30 NOTE — PROGRESS NOTES
[facility] at [address], [address] [place] [address] W: [phone]  F: [phone] Reason for Visit:  
Hari Daigle is a 75 y.o. female who is seen in consultation at the request of Dr. Jazmine Abdul for evaluation of Myelofibrosis. History of Present Illness:  
Patient is a 75 y. o. with PMF who is admitted on 7/14/18 with c/o weakness, N/V and intermittent fevers x 5 days. She has a known h/o UTIs. Noted to have anemia, thrombocytopenia and leucocytosis on admission. CXR and UA unremarkable. She was started on Levaquin and Vancomycin and underwent I & D for a perirectal abscess on 7/14/18. She states that she underwent a BMT in 2013 for MF but relapsed soon after. Has since been on Jakafi and follows with Hematology at [facility]. She has also been on Exjade for transfusion iron overload. Does not think she has an enlarged spleen. She relocated from [facility] last year but was in the process of moving back later in this week. In the last 3-4 days she noted weakness, confusion, intermittent fevers and urinary incontinence. 1 Day prior to presentation she thought she felt a swelling in her rectal area. Hence she presented to the ER when she was found to have a temp of 102 F Since the I and D she has had a Tmax of 100F. Has better alertness, still has pain in the perirectal area, has no chills or bleeding. Notes some nausea x 1 day. No CP, SOB, Cough, dysuria. Has not had a BM since 3 days but has no abdominal discomfort Interval History: Hoping to go home tomorrow; had corcoran removed. Pain controlled. Denies N/V or SOB. No family at bedside. Aware she needs to have close follow up with primary oncologist. Appt this Wed already scheduled. Follows with Dr Qamar Leonard/oncologist at [facility].([phone]); I have spoken with her Past Medical History:  
Diagnosis Date  Myelofibrosis ([facility] BMT in 2013 for MF but relapsed soon after. Has since been on Jakafi and follows with Hematology at [facility] Past Surgical History:  
Procedure Laterality Date  HX BONE MARROW TRANSPLANT  HX VASCULAR ACCESS    
 right portacath Social History Substance Use Topics  Smoking status: Never Smoker  Smokeless tobacco: Never Used  Alcohol use Yes Comment: seldom Family History Problem Relation Age of Onset  Heart Attack Mother Current Facility-Administered Medications Medication Dose Route Frequency  polyethylene glycol (MIRALAX) packet 17 g  17 g Oral DAILY PRN  
 amoxicillin-clavulanate (AUGMENTIN) 875-125 mg per tablet 1 Tab  1 Tab Oral Q12H  
 bisacodyl (DULCOLAX) suppository 10 mg  10 mg Rectal DAILY PRN  
 lidocaine (PF) (XYLOCAINE) 10 mg/mL (1 %) injection 0.1 mL  0.1 mL SubCUTAneous PRN  
 sodium chloride (NS) flush 5-10 mL  5-10 mL IntraVENous Q8H  
 sodium chloride (NS) flush 5-10 mL  5-10 mL IntraVENous PRN  
 sodium chloride (NS) flush 5-10 mL  5-10 mL IntraVENous Q8H  
 sodium chloride (NS) flush 5-10 mL  5-10 mL IntraVENous PRN  
 lidocaine (PF) (XYLOCAINE) 10 mg/mL (1 %) injection 0.1 mL  0.1 mL SubCUTAneous PRN  
 sodium chloride (NS) flush 5-10 mL  5-10 mL IntraVENous Q8H  
 sodium chloride (NS) flush 5-10 mL  5-10 mL IntraVENous PRN  
 naloxone (NARCAN) injection 0.04 mg  0.04 mg IntraVENous Multiple  flumazenil (ROMAZICON) 0.1 mg/mL injection 0.2 mg  0.2 mg IntraVENous Multiple  sodium chloride (NS) flush 5-10 mL  5-10 mL IntraVENous PRN  
 doxycycline (VIBRA-TABS) tablet 100 mg  100 mg Oral Q12H  
 senna-docusate (PERICOLACE) 8.6-50 mg per tablet 1 Tab  1 Tab Oral BID  senna (SENOKOT) tablet 43 mg  5 Tab Oral QHS  ruxolitinib tab 15 mg  (Patient Supplied)  15 mg Oral DAILY  aluminum-magnesium hydroxide (MAALOX) oral suspension 30 mL  30 mL Oral QID PRN  promethazine (PHENERGAN) 25 mg in NS IVPB  25 mg IntraVENous Q8H PRN  pantoprazole (PROTONIX) tablet 40 mg  40 mg Oral ACB  oxyCODONE-acetaminophen (PERCOCET) 5-325 mg per tablet 1 Tab  1 Tab Oral Q4H PRN  
 HYDROmorphone (DILAUDID) tablet 1 mg  1 mg Oral Q4H PRN  
 sodium chloride (NS) flush 5-10 mL  5-10 mL IntraVENous PRN  
 sodium chloride (NS) flush 5-10 mL  5-10 mL IntraVENous Q8H  
 sodium chloride (NS) flush 5-10 mL  5-10 mL IntraVENous PRN  
 acetaminophen (TYLENOL) tablet 650 mg  650 mg Oral Q4H PRN  
 ondansetron (ZOFRAN) injection 4 mg  4 mg IntraVENous Q4H PRN No Known Allergies Review of Systems: A complete review of systems was obtained, negative except as described above. Physical Exam:  
 
Visit Vitals  /66 (BP 1 Location: Left arm, BP Patient Position: At rest)  Pulse 93  Temp 98 °F (36.7 °C)  Resp 16  
 Ht 5' 6\" (1.676 m)  Wt 64.2 kg (141 lb 8.6 oz)  SpO2 98%  BMI 22.84 kg/m2 ECOG PS: 2 General: No cute distress Eyes: PERRLA, anicteric sclerae HENT: Atraumatic, OP clear Neck: Supple Respiratory: normal respiratory effort CV:  regular rhythm, no murmurs, no peripheral edema GI: Soft, nontender, nondistended, no masses, no hepatomegaly, no splenomegaly MS:  Digits without clubbing or cyanosis. Skin: No rashes, ecchymoses, or petechiae. Normal temperature, turgor, and texture. Psych: Alert, oriented, appropriate affect, normal judgment/insight External genitalia Erythematous vulva. Packing noted Results:  
 
Lab Results Component Value Date/Time WBC 11.2 (H) 07/30/2018 03:22 AM  
 HGB 7.9 (L) 07/30/2018 03:22 AM  
 HCT 24.8 (L) 07/30/2018 03:22 AM  
 PLATELET 73 (L) 82/88/9372 03:22 AM  
 MCV 92.9 07/30/2018 03:22 AM  
 ABS. NEUTROPHILS 6.7 07/30/2018 03:22 AM  
 
Lab Results Component Value Date/Time  Sodium 139 07/30/2018 03:22 AM  
 Potassium 3.8 07/30/2018 03:22 AM  
 Chloride 104 07/30/2018 03:22 AM  
 CO2 26 07/30/2018 03:22 AM  
 Glucose 90 07/30/2018 03:22 AM  
 BUN 9 07/30/2018 03:22 AM  
 Creatinine 0.88 07/30/2018 03:22 AM  
 GFR est AA >60 07/30/2018 03:22 AM  
 GFR est non-AA >60 07/30/2018 03:22 AM  
 Calcium 8.4 (L) 07/30/2018 03:22 AM  
 
Lab Results Component Value Date/Time Bilirubin, total 0.8 07/30/2018 03:22 AM  
 ALT (SGPT) 22 07/30/2018 03:22 AM  
 AST (SGOT) 31 07/30/2018 03:22 AM  
 Alk. phosphatase 103 07/30/2018 03:22 AM  
 Protein, total 6.9 07/30/2018 03:22 AM  
 Albumin 3.1 (L) 07/30/2018 03:22 AM  
 Globulin 3.8 07/30/2018 03:22 AM  
 
 
7/15/2018 CT PELV W CONT IMPRESSION: No pelvic abscess identified. 
   
7/15/2018 XR CHEST IMPRESSION:  
  
New development of mild to moderate interstitial edema. Right perihilar airspace 
disease which may represent asymmetric pulmonary edema versus pneumonia. Assessment/Plan 1) Fever and darby rectal abscess S/p I & D,  
abx coverage:   Vancomycin and zosyn 
adebridement of wound( 7/25) : surgery recommending cont abx; ok for surgery. 2) Myelofibrosis Follows with heme/onc at [facility] : Dr Gail Christian S/p BMT in 2013, relapsed and since on Jakafi at 15 mg BID Primary oncologist / Dr Jose Manuel Mcgowan recommending resuming Jakafi at half dose; resumed at 15 mg daily. Will continue. Should not interfere with wound healing as long as wbc does not drop into the leukopenia range. Continue with daily CBC Recommend follow up with primary heme/onc at [facility] upon discharge: informed to make an appointment within a week from discharge 3) Thrombocytopenia Secondary to MF, Jakafi and acute infection Stable Monitor Continues to improve 4) Anemia (s/p 2 units PRBCs) Secondary to MF Hgb stable Minimize transfusions and consider only if Hgb < 7 g/dl 5) Leukocytosis Secondary to MF and acute infection Blasts 5%- can be a result of acute infection. < 10% blasts can be seen with MF and does not indicate leukemic transformation. Blasts improved from what was seen from Dr. Derick Baldwin office Rising leukocytosis may be from myelofibrosis and decreasing the dose of the [facility] and possible need for debridement of rectal area abscess Continue to monitor 6) Iron overload Hold Exjade 7) Hypokalemia Received repletion Continue to monitor 8) Dispo Has moved  to a new home in [facility].MARGARET; [place] during hospitalization. CM aware in case home health is needed at discharge. Plan reviewed with Dr Suzette Segovia.   
 
Signed By: Yousuf Galvan NP

## 2018-07-30 NOTE — PROGRESS NOTES
4:45 PM 
Pt had a medium formed brown stool. Urinated on bedside commode. Bladder scan shows remainder of 135 ml. Dressing to wound changed.

## 2018-07-30 NOTE — PROGRESS NOTES
7/30/2018 11:47 AM 
CM informed [facility] that pt will be discharging tomorrow. CM to send DC clinicals when submitted. No additional DC service needs indicated.

## 2018-07-30 NOTE — PROGRESS NOTES
Daily Progress Note: 7/30/2018 Patria Vann MD 
 
Assessment/Plan:  
Sepsis POA due to below:Fever (7/13/2018)/  Leukocytosis (7/14/2018)/  SIRS (systemic inflammatory response syndrome) ([facility] (7/14/2018): in the setting of immunosuppression.  
--broad spectrum abx - pip-tazo and Doxy- to complete 7/30 
--WBC improving Rhona-rectal abscess (7/14/2018): left side. Likely source of fever. IV antibiotics as above. S/p I+D on 7/14; wound cultures pending --per surgery --pelvic CT did not show pelvic abscess- repeat CT neg 
--continue catheter 
--Repeat I&D 7/25/18 
 
 Myelofibrosis ([facility] (7/14/2018)/ Anemia/Thrombocytopenia ([facility] (7/14/2018): follows up at [facility]. Hgb up to 8s after 2 units of PRBCs 7/14 
--heme following 
  
Hypokalemia (7/14/2018): likely from vomiting. Replete and follow K+ 
  
Nausea and vomiting (7/14/2018): likely from infection as above. resolved Constipation --Stool softeners daily --Stop Miralax as this has not been effective in the past 
--Add supp 
  
Code Status:  Full Problem List: 
Problem List as of 7/30/2018  Date Reviewed: 7/14/2018 Codes Class Noted - Resolved Myelofibrosis ([facility] ICD-10-CM: C85.85 ICD-9-CM: 289.83  7/14/2018 - Present Thrombocytopenia ([facility] ICD-10-CM: D69.6 ICD-9-CM: 287.5  7/14/2018 - Present Hypokalemia ICD-10-CM: E87.6 ICD-9-CM: 276.8  7/14/2018 - Present Leukocytosis ICD-10-CM: C90.292 ICD-9-CM: 288.60  7/14/2018 - Present Nausea and vomiting ICD-10-CM: R11.2 ICD-9-CM: 787.01  7/14/2018 - Present SIRS (systemic inflammatory response syndrome) (HCC) ICD-10-CM: R65.10 ICD-9-CM: 995.90  7/14/2018 - Present Rhona-rectal abscess ICD-10-CM: K61.1 ICD-9-CM: 638  7/14/2018 - Present * (Principal)Fever ICD-10-CM: R50.9 ICD-9-CM: 780.60  7/13/2018 - Present Subjective:  
Ms. Carrie Golden is a 75 y.o. female who is being admitted for Fever. Ms. Carrie Golden presented to our Emergency Department today complaining of intermittent fever and chills associated with generalized weakness. She has also been having nausea and vomiting but denies any headaches, flu like or respiratory symptoms, No abdominal discomfort or diarrhea. She has not has any urinary symptoms. No overt focal symptoms. She does have a Hx of myelofibrosis and follows up at [facility]. Work up thus far has been unremarkable. In light of her immunosuppression, she will be admitted for further management. [Dr Torie Bryson 
  
7/14: Pt found to have perirectal abscess. Afebrile since 5AM.  Still nauseated. NPO for I+D in OR today with Dr Santiago Velasco. 7/15: continues to be nauseated. Labs pending this AM.  Surgeon doesn't feel it is much better despite I+D yesterday. Pt says pain is better controlled with percocet. 7/16/18: K+ 2.6. Repleting with IV. (6 total) WBC a little better. Tmax 98.8.  
 
7/17:  Pt reports she is feeling somewhat better. Dr Nidhi Hopkins from ([address] [place] [address]) called last night (see note from last night) and wanted Mario 2 inhibitor restarted at 1/2 dose and weaned off from there. WBC remains 15K. K+ back down - replacing. Tmax 101.9. Tnow 98.6. Diuresed about a liter overnight. Checking Mag also. 7/18:  Reports \"starting to feel better. \"  Little pain from surg site. Tmax 99. K+ 3.1 - cont to replace. WBC remains at 15K. LFTs sl up - cont to monitor. 7/19/18: Less pain. Finally had a BM and feels better. WBC still up. Tmax 99.7 7/20:  She reports she feels better but remains very weak. Cont to need IV antibiotics. WBC up to 20K. Tmax 99. K+ normalized. LFTs better. 7/21:  Tmax 99. WBC up again. Will repeat CT of the pelvis as her WBC is higher. She is having less pain. 7/22/18: Feeling pretty well. WBC is still high but repeat CT was neg for abscess. Will ask surgery to see again tomorrow.   
 
7/23/18: Continues to grad feel better and wants to go home today. Will ask surgery to see again and if nothing else needs to be done then home later today. She wants to leave corcoran in place as urine irritates her abscess site. WBC elevated but discussed with Heme-Onc and may be due to the Myelofibrosis but she is to follow up with Dr Nika Norton at [facility] later this wk or early next wk. Will discuss with ID regarding outpt antibiotics. 18: Feeling a bit better today. For OR today for repeat I&D. WBC 22.  
 
18: For OR today. No complaints. WBC 23. She is NPO 
 
18: Went back to the OR last night. Having some pain but tolerable. WBC up. Tmax 99.5 18: WBC is better at 13.9. Hb down to 7.9. Tmax 99.2. Feeling pretty well.  
 
18: WBC still coming down. Hb 8.1. Afebrile. Little pain. Will complete IV antibiotic regimen on Monday. Has been cleared by surgery. Hopefully can go home on Tues. :  Hb 7.8 - watching. She is having more constipation and has not had a BM. Has been taking senokot without relief. Having more gas. :  Still c/o constipation but thinks \"something will happen today. \"  Little pain. Wants to try with corcoran out and if no irritation of surg site then leave out. DC IV antibiotics and start Augmentin po. Discussed with Surg and will look at site and see if anything else needs to be done. Hb 7.9 - stable. Hopefully planning to DC to home tomorrow baring other complications. Review of Systems: A comprehensive review of systems was negative except for that written in the HPI. Objective:  
Physical Exam:  
 
Visit Vitals  /74 (BP 1 Location: Left arm, BP Patient Position: At rest)  Pulse 83  Temp 98.2 °F (36.8 °C)  Resp 14  
 Ht 5' 6\" (1.676 m)  Wt 64.2 kg (141 lb 8.6 oz)  SpO2 98%  BMI 22.84 kg/m2 O2 Flow Rate (L/min): 2 l/min O2 Device: Room air Temp (24hrs), Av.3 °F (36.8 °C), Min:97.8 °F (36.6 °C), Max:98.7 °F (37.1 °C) 
      1901 -  0700 In: -  
Out: 0076 [[id number]] General:  Alert, cooperative, no distress, appears stated age Head:  Normocephalic, without obvious abnormality, atraumatic. Eyes:  Conjunctivae/corneas clear. PERRL, EOMs intact. Nose: Nares normal. Septum midline. Throat: Lips, mucosa, and tongue normal  
Neck: Supple, symmetrical, trachea midline, no adenopathy, thyroid: no enlargement/tenderness/nodules, no carotid bruit and no JVD. Back:   Symmetric, no curvature. ROM normal. No CVA tenderness. Lungs:   Clear to auscultation Chest wall:  No tenderness or deformity. Heart:  Regular rate and rhythm, S1, S2 normal, 1/9 ASHA, click, rub or gallop. Abdomen:   Soft, non-tender. Bowel sounds normal. No masses,  No organomegaly. : corcoran patent, labia with packing present Extremities: Extremities normal, atraumatic, no cyanosis or edema. No calf tenderness or cords. Pulses: 2+ and symmetric all extremities. Skin: Skin color, texture, turgor normal. No rashes or lesions Neurologic: CNII-XII intact. Alert and oriented X 3. Fine motor of hands and fingers normal.   equal.  Gait not tested at this time. Sensation grossly normal to touch. Gross motor of extremities normal.    
 
Data Review:  
   
Recent Days: 
Recent Labs  
   07/30/18 0322 07/29/18 0033 07/28/18 0256 WBC  11.2*  11.9*  12.3* HGB  7.9*  7.8*  8.1* HCT  24.8*  24.9*  25.6* PLT  73*  70*  71* Recent Labs  
   07/30/18 0322 07/29/18 0033  07/28/18 
 0256 NA  139   --    --   
K  3.8   --    --   
CL  104   --    --   
CO2  26   --    --   
GLU  90   --    --   
BUN  9   --    --   
CREA  0.88   --    --   
CA  8.4*   --    --   
MG  2.2  2.0  2.0 ALB  3.1*   --    --   
SGOT  31   --    --   
ALT  22   --    -- No results for input(s): PH, PCO2, PO2, HCO3, FIO2 in the last 72 hours. 24 Hour Results: 
Recent Results (from the past 24 hour(s)) CBC WITH AUTOMATED DIFF  Collection Time: 07/30/18  3:22 AM  
Result Value Ref Range WBC 11.2 (H) 3.6 - 11.0 K/uL  
 RBC 2.67 (L) 3.80 - 5.20 M/uL HGB 7.9 (L) 11.5 - 16.0 g/dL HCT 24.8 (L) 35.0 - 47.0 % MCV 92.9 80.0 - 99.0 FL  
 MCH 29.6 26.0 - 34.0 PG  
 MCHC 31.9 30.0 - 36.5 g/dL  
 RDW 17.9 (H) 11.5 - 14.5 % PLATELET 73 (L) 638 - 400 K/uL MPV 11.7 8.9 - 12.9 FL  
 NRBC 2.6 (H) 0  WBC ABSOLUTE NRBC 0.29 (H) 0.00 - 0.01 K/uL NEUTROPHILS 55 32 - 75 % BAND NEUTROPHILS 5 0 - 6 % LYMPHOCYTES 8 (L) 12 - 49 % MONOCYTES 21 (H) 5 - 13 % EOSINOPHILS 0 0 - 7 % BASOPHILS 4 (H) 0 - 1 % METAMYELOCYTES 4 (H) 0 % PROMYELOCYTES 1 (H) 0 % BLASTS 2 (H) 0 % IMMATURE GRANULOCYTES 0 %  
 ABS. NEUTROPHILS 6.7 1.8 - 8.0 K/UL  
 ABS. LYMPHOCYTES 0.9 0.8 - 3.5 K/UL  
 ABS. MONOCYTES 2.4 (H) 0.0 - 1.0 K/UL  
 ABS. EOSINOPHILS 0.0 0.0 - 0.4 K/UL  
 ABS. BASOPHILS 0.4 (H) 0.0 - 0.1 K/UL  
 ABS. IMM. GRANS. 0.0 K/UL  
 DF MANUAL    
 RBC COMMENTS STOMATOCYTES 2+ 
    
 RBC COMMENTS ANISOCYTOSIS 
1+ MAGNESIUM Collection Time: 07/30/18  3:22 AM  
Result Value Ref Range Magnesium 2.2 1.6 - 2.4 mg/dL METABOLIC PANEL, COMPREHENSIVE Collection Time: 07/30/18  3:22 AM  
Result Value Ref Range Sodium 139 136 - 145 mmol/L Potassium 3.8 3.5 - 5.1 mmol/L Chloride 104 97 - 108 mmol/L  
 CO2 26 21 - 32 mmol/L Anion gap 9 5 - 15 mmol/L Glucose 90 65 - 100 mg/dL BUN 9 6 - 20 MG/DL Creatinine 0.88 0.55 - 1.02 MG/DL  
 BUN/Creatinine ratio 10 (L) 12 - 20 GFR est AA >60 >60 ml/min/1.73m2 GFR est non-AA >60 >60 ml/min/1.73m2 Calcium 8.4 (L) 8.5 - 10.1 MG/DL Bilirubin, total 0.8 0.2 - 1.0 MG/DL  
 ALT (SGPT) 22 12 - 78 U/L  
 AST (SGOT) 31 15 - 37 U/L Alk. phosphatase 103 45 - 117 U/L Protein, total 6.9 6.4 - 8.2 g/dL Albumin 3.1 (L) 3.5 - 5.0 g/dL Globulin 3.8 2.0 - 4.0 g/dL A-G Ratio 0.8 (L) 1.1 - 2.2 Medications reviewed Current Facility-Administered Medications Medication Dose Route Frequency  bisacodyl (DULCOLAX) suppository 10 mg  10 mg Rectal DAILY PRN  piperacillin-tazobactam (ZOSYN) 3.375 g in 0.9% sodium chloride (MBP/ADV) 100 mL  3.375 g IntraVENous Q8H  
 lidocaine (PF) (XYLOCAINE) 10 mg/mL (1 %) injection 0.1 mL  0.1 mL SubCUTAneous PRN  
 sodium chloride (NS) flush 5-10 mL  5-10 mL IntraVENous Q8H  
 sodium chloride (NS) flush 5-10 mL  5-10 mL IntraVENous PRN  
 sodium chloride (NS) flush 5-10 mL  5-10 mL IntraVENous Q8H  
 sodium chloride (NS) flush 5-10 mL  5-10 mL IntraVENous PRN  
 lidocaine (PF) (XYLOCAINE) 10 mg/mL (1 %) injection 0.1 mL  0.1 mL SubCUTAneous PRN  
 sodium chloride (NS) flush 5-10 mL  5-10 mL IntraVENous Q8H  
 sodium chloride (NS) flush 5-10 mL  5-10 mL IntraVENous PRN  
 naloxone (NARCAN) injection 0.04 mg  0.04 mg IntraVENous Multiple  flumazenil (ROMAZICON) 0.1 mg/mL injection 0.2 mg  0.2 mg IntraVENous Multiple  sodium chloride (NS) flush 5-10 mL  5-10 mL IntraVENous PRN  
 doxycycline (VIBRA-TABS) tablet 100 mg  100 mg Oral Q12H  
 senna-docusate (PERICOLACE) 8.6-50 mg per tablet 1 Tab  1 Tab Oral BID  senna (SENOKOT) tablet 43 mg  5 Tab Oral QHS  ruxolitinib tab 15 mg  (Patient Supplied)  15 mg Oral DAILY  aluminum-magnesium hydroxide (MAALOX) oral suspension 30 mL  30 mL Oral QID PRN  promethazine (PHENERGAN) 25 mg in NS IVPB  25 mg IntraVENous Q8H PRN  pantoprazole (PROTONIX) tablet 40 mg  40 mg Oral ACB  oxyCODONE-acetaminophen (PERCOCET) 5-325 mg per tablet 1 Tab  1 Tab Oral Q4H PRN  
 HYDROmorphone (DILAUDID) tablet 1 mg  1 mg Oral Q4H PRN  
 sodium chloride (NS) flush 5-10 mL  5-10 mL IntraVENous PRN  
 sodium chloride (NS) flush 5-10 mL  5-10 mL IntraVENous Q8H  
 sodium chloride (NS) flush 5-10 mL  5-10 mL IntraVENous PRN  
 acetaminophen (TYLENOL) tablet 650 mg  650 mg Oral Q4H PRN  
 ondansetron (ZOFRAN) injection 4 mg  4 mg IntraVENous Q4H PRN  
 0.9% sodium chloride infusion 250 mL  250 mL IntraVENous PRN Care Plan discussed with: Patient Total time spent with patient: 30 minutes.  
 
Mary Hollis MD

## 2018-07-31 VITALS
SYSTOLIC BLOOD PRESSURE: 147 MMHG | TEMPERATURE: 98.1 F | HEART RATE: 85 BPM | WEIGHT: 141.54 LBS | DIASTOLIC BLOOD PRESSURE: 70 MMHG | HEIGHT: 66 IN | OXYGEN SATURATION: 100 % | RESPIRATION RATE: 16 BRPM | BODY MASS INDEX: 22.75 KG/M2

## 2018-07-31 LAB
ALBUMIN SERPL-MCNC: 3 G/DL (ref 3.5–5)
ALBUMIN/GLOB SERPL: 0.8 {RATIO} (ref 1.1–2.2)
ALP SERPL-CCNC: 98 U/L (ref 45–117)
ALT SERPL-CCNC: 24 U/L (ref 12–78)
ANION GAP SERPL CALC-SCNC: 9 MMOL/L (ref 5–15)
AST SERPL-CCNC: 32 U/L (ref 15–37)
BASOPHILS # BLD: 0.2 K/UL (ref 0–0.1)
BASOPHILS NFR BLD: 2 % (ref 0–1)
BILIRUB SERPL-MCNC: 0.6 MG/DL (ref 0.2–1)
BLASTS NFR BLD MANUAL: 1 %
BUN SERPL-MCNC: 10 MG/DL (ref 6–20)
BUN/CREAT SERPL: 12 (ref 12–20)
CALCIUM SERPL-MCNC: 8.3 MG/DL (ref 8.5–10.1)
CHLORIDE SERPL-SCNC: 104 MMOL/L (ref 97–108)
CO2 SERPL-SCNC: 25 MMOL/L (ref 21–32)
CREAT SERPL-MCNC: 0.81 MG/DL (ref 0.55–1.02)
DIFFERENTIAL METHOD BLD: ABNORMAL
EOSINOPHIL # BLD: 0.1 K/UL (ref 0–0.4)
EOSINOPHIL NFR BLD: 1 % (ref 0–7)
ERYTHROCYTE [DISTWIDTH] IN BLOOD BY AUTOMATED COUNT: 17.6 % (ref 11.5–14.5)
GLOBULIN SER CALC-MCNC: 3.7 G/DL (ref 2–4)
GLUCOSE SERPL-MCNC: 94 MG/DL (ref 65–100)
HCT VFR BLD AUTO: 23.4 % (ref 35–47)
HGB BLD-MCNC: 7.4 G/DL (ref 11.5–16)
IMM GRANULOCYTES # BLD: 0 K/UL
IMM GRANULOCYTES NFR BLD AUTO: 0 %
LYMPHOCYTES # BLD: 1.2 K/UL (ref 0.8–3.5)
LYMPHOCYTES NFR BLD: 11 % (ref 12–49)
MAGNESIUM SERPL-MCNC: 2.2 MG/DL (ref 1.6–2.4)
MCH RBC QN AUTO: 29.4 PG (ref 26–34)
MCHC RBC AUTO-ENTMCNC: 31.6 G/DL (ref 30–36.5)
MCV RBC AUTO: 92.9 FL (ref 80–99)
METAMYELOCYTES NFR BLD MANUAL: 6 %
MONOCYTES # BLD: 1.4 K/UL (ref 0–1)
MONOCYTES NFR BLD: 13 % (ref 5–13)
MYELOCYTES NFR BLD MANUAL: 2 %
NEUTS BAND NFR BLD MANUAL: 6 % (ref 0–6)
NEUTS SEG # BLD: 7.1 K/UL (ref 1.8–8)
NEUTS SEG NFR BLD: 58 % (ref 32–75)
NRBC # BLD: 0.7 K/UL (ref 0–0.01)
NRBC BLD-RTO: 6.3 PER 100 WBC
PLATELET # BLD AUTO: 89 K/UL (ref 150–400)
POTASSIUM SERPL-SCNC: 3.5 MMOL/L (ref 3.5–5.1)
PROT SERPL-MCNC: 6.7 G/DL (ref 6.4–8.2)
RBC # BLD AUTO: 2.52 M/UL (ref 3.8–5.2)
RBC MORPH BLD: ABNORMAL
SODIUM SERPL-SCNC: 138 MMOL/L (ref 136–145)
WBC # BLD AUTO: 11.1 K/UL (ref 3.6–11)

## 2018-07-31 PROCEDURE — 36415 COLL VENOUS BLD VENIPUNCTURE: CPT | Performed by: FAMILY MEDICINE

## 2018-07-31 PROCEDURE — 94760 N-INVAS EAR/PLS OXIMETRY 1: CPT

## 2018-07-31 PROCEDURE — 74011250637 HC RX REV CODE- 250/637: Performed by: FAMILY MEDICINE

## 2018-07-31 PROCEDURE — 74011250637 HC RX REV CODE- 250/637: Performed by: INTERNAL MEDICINE

## 2018-07-31 PROCEDURE — 83735 ASSAY OF MAGNESIUM: CPT | Performed by: FAMILY MEDICINE

## 2018-07-31 PROCEDURE — 85025 COMPLETE CBC W/AUTO DIFF WBC: CPT | Performed by: FAMILY MEDICINE

## 2018-07-31 PROCEDURE — 80053 COMPREHEN METABOLIC PANEL: CPT | Performed by: FAMILY MEDICINE

## 2018-07-31 PROCEDURE — 97116 GAIT TRAINING THERAPY: CPT

## 2018-07-31 RX ADMIN — PANTOPRAZOLE SODIUM 40 MG: 40 TABLET, DELAYED RELEASE ORAL at 06:36

## 2018-07-31 RX ADMIN — SENNOSIDES AND DOCUSATE SODIUM 1 TABLET: 8.6; 5 TABLET ORAL at 09:03

## 2018-07-31 RX ADMIN — DOXYCYCLINE HYCLATE 100 MG: 100 TABLET, COATED ORAL at 09:03

## 2018-07-31 RX ADMIN — AMOXICILLIN AND CLAVULANATE POTASSIUM 1 TABLET: 875; 125 TABLET, FILM COATED ORAL at 09:03

## 2018-07-31 RX ADMIN — ACETAMINOPHEN 650 MG: 325 TABLET ORAL at 10:40

## 2018-07-31 NOTE — PROGRESS NOTES
Problem: Mobility Impaired (Adult and Pediatric) Goal: *Acute Goals and Plan of Care (Insert Text) Physical Therapy Goals Initiated 7/19/2018 GOALS STILL RELEVANT 7/27/2018 1. Patient will transfer from bed to chair and chair to bed with modified independence using the least restrictive device within 3 day(s). 3.  Patient will perform sit to stand with modified independence within 3 day(s). 4.  Patient will ambulate with modified independence for 300 feet with the least restrictive device within 3 day(s). 5.  Patient will ascend/descend 5 stairs with 1 handrail(s) with modified independence within 3 day(s). physical Therapy TREATMENT Patient: Enma Lyle (30 y.o. female) Date: 7/31/2018 Diagnosis: Fever PERIRECTAL ABSCESS PERIRECTAL ABSCESS PERIRECTAL ABSCESS Fever Procedure(s) (LRB): PERIRECTAL ABSCESS EXCISION (N/A) 6 Days Post-Op Precautions:   
Chart, physical therapy assessment, plan of care and goals were reviewed. ASSESSMENT: 
Pt received in bed, agreeable to participate with physical therapy. SBA/Supervision for bed mobility, functional transfers and gait training x `120 using RW for steadying. Ascend/descned  4 steps using single handrail on R and CGA/SBA. Pt fatigued with session and requested to return to bed. Progression toward goals: 
[x]    Improving appropriately and progressing toward goals 
[]    Improving slowly and progressing toward goals 
[]    Not making progress toward goals and plan of care will be adjusted PLAN: 
Patient continues to benefit from skilled intervention to address the above impairments. Continue treatment per established plan of care. Discharge Recommendations:  Home Health Further Equipment Recommendations for Discharge:  none SUBJECTIVE:  
Patient stated I would like to get up an walk a little.  OBJECTIVE DATA SUMMARY:  
Critical Behavior: 
  
  
  
  
Functional Mobility Training: 
Bed Mobility: 
  
Supine to Sit: Stand-by assistance Sit to Supine: Stand-by assistance Transfers: 
Sit to Stand: Stand-by assistance Stand to Sit: Stand-by assistance Balance: 
Sitting: Intact Standing: Intact; With support Ambulation/Gait Training: 
Distance (ft): 120 Feet (ft) Assistive Device: Gait belt;Walker, rolling Ambulation - Level of Assistance: Stand-by assistance Speed/Dee Dee: Pace decreased (<100 feet/min) Stairs: 
Number of Stairs Trained: 4 Stairs - Level of Assistance: Contact guard assistance;Stand-by assistance Rail Use: Right Neuro Re-Education: 
 
Therapeutic Exercises:  
 
Pain: 
Pain Scale 1: Numeric (0 - 10) Pain Intensity 1: 0 Activity Tolerance:  
Good Please refer to the flowsheet for vital signs taken during this treatment. After treatment:  
[]    Patient left in no apparent distress sitting up in chair 
[x]    Patient left in no apparent distress in bed 
[x]    Call bell left within reach [x]    Nursing notified 
[]    Caregiver present 
[]    Bed alarm activated COMMUNICATION/COLLABORATION:  
The patients plan of care was discussed with: Registered Nurse Kimber Meyers Time Calculation: 20 mins

## 2018-07-31 NOTE — PROGRESS NOTES
[facility] follow-up appointment on Monday August 6,2018 @ 4:30 p.m.  with Dr. Albertina Bales. Added to AVS. Jase Ocampo CM Specialist

## 2018-07-31 NOTE — PROGRESS NOTES
7/31/2018 10:18 AM 
Pt discharge noted. Pt will be followed by [facility] upon discharge, and CM faxed DC clinicals to [phone]. Pt's family will provide transport upon discharge. No additional DC service needs indicated.

## 2018-07-31 NOTE — PROGRESS NOTES
Patient for discharge, went over discharge instructions with patient and prescriptions already sent to pharmacy. Follow-up care  with physician, pt to make appointment. Patient understood instructions and patient in no apparent distress.

## 2018-07-31 NOTE — PROGRESS NOTES
Physical therapy 840 chart reviewed, spoke with RN. Pt received in bed,anticipating discharge and declining to participate with physical therapy at this time. Offered stair training prior to going home, pt replied \"i got it, thanks\" Brandon Pacheco [place]

## 2018-07-31 NOTE — DISCHARGE SUMMARY
Physician Discharge Summary     Patient ID:    Osmel Gu  [id number]  89 y.o.  1945  Shahriar Story MD    Admit date: 7/13/2018  Discharge date and time: 7/31/2018  Admission Diagnoses: Fever;PERIRECTAL ABSCESS;PERIRECTAL ABSCESS;PERIRECTAL ABSC*  Chronic Diagnoses:    Problem List as of 7/31/2018  Date Reviewed: 7/14/2018          Codes Class Noted - Resolved    Myelofibrosis ([facility] [address] ICD-10-CM: D75.81  ICD-9-CM: 289.83  7/14/2018 - Present        Thrombocytopenia ([facility] [address] ICD-10-CM: D69.6  ICD-9-CM: 287.5  7/14/2018 - Present        Hypokalemia ICD-10-CM: E87.6  ICD-9-CM: 276.8  7/14/2018 - Present        Leukocytosis ICD-10-CM: D72.829  ICD-9-CM: 288.60  7/14/2018 - Present        Nausea and vomiting ICD-10-CM: R11.2  ICD-9-CM: 787.01  7/14/2018 - Present        SIRS (systemic inflammatory response syndrome) (HCC) ICD-10-CM: R65.10  ICD-9-CM: 995.90  7/14/2018 - Present        Rhona-rectal abscess ICD-10-CM: K61.1  ICD-9-CM: 345  7/14/2018 - Present        * (Principal)Fever ICD-10-CM: R50.9  ICD-9-CM: 780.60  7/13/2018 - Present            Discharge Medications:   Current Discharge Medication List      START taking these medications    Details   amoxicillin-clavulanate (AUGMENTIN) 875-125 mg per tablet Take 1 Tab by mouth every twelve (12) hours. Qty: 12 Tab, Refills: 0      doxycycline (VIBRA-TABS) 100 mg tablet Take 1 Tab by mouth every twelve (12) hours. Qty: 12 Tab, Refills: 0      potassium chloride (KLOR-CON) 20 mEq packet Take 1 Packet by mouth daily. Qty: 15 Packet, Refills: 0      senna (SENOKOT) 8.6 mg tablet Take 5 Tabs by mouth nightly. PRN  Qty: 30 Tab, Refills: 0         CONTINUE these medications which have CHANGED    Details   ruxolitinib (JAKAFI) 15 mg tab Take 1 Tab by mouth daily. Qty: 1 Tab, Refills: 0         CONTINUE these medications which have NOT CHANGED    Details   deferasirox (EXJADE) 500 mg oral disentegrating tablet Take 1,500 mg by mouth daily.       therapeutic multivitamin (THERAGRAN) tablet Take 1 Tab by mouth daily. acetaminophen (TYLENOL) 325 mg tablet Take 650 mg by mouth every four (4) hours as needed for Pain. Follow up Care:    1. Vivek Cherry MD with in 1 weeks  2.  specialists as directed. Diet:  Regular Diet  Disposition:  Home. Advanced Directive:  Discharge Exam:  [See today's progress note.]  CONSULTATIONS: ID and Hematology/Oncology    Significant Diagnostic Studies:   Recent Labs      07/31/18 0324 07/30/18 0322   WBC  11.1*  11.2*   HGB  7.4*  7.9*   HCT  23.4*  24.8*   PLT  89*  73*     Recent Labs      07/31/18 0324 07/30/18 0322 07/29/18   0033   NA  138  139   --    K  3.5  3.8   --    CL  104  104   --    CO2  25  26   --    BUN  10  9   --    CREA  0.81  0.88   --    GLU  94  90   --    CA  8.3*  8.4*   --    MG  2.2  2.2  2.0     Recent Labs      07/31/18 0324 07/30/18 0322   SGOT  32  31   ALT  24  22   AP  98  103   TBILI  0.6  0.8   TP  6.7  6.9   ALB  3.0*  3.1*   GLOB  3.7  3.8     CT Results (most recent):    Results from Hospital Encounter encounter on 07/13/18   CT PELV W CONT   Narrative EXAM:  CT PELV W CONT  INDICATION: Perirectal abscess status post debridement. Evaluate for residual or  recurrent abscess. COMPARISON: 7/15/2018. FINDINGS:   There is overall mildly increased stranding in the left perianal fat extending  to the left gluteal subcutaneous soft tissues. There is no evidence for abscess  or drainable collection. The urinary bladder is nondistended with a Callejas catheter. There is no pelvic  mass. There is distal colonic diverticulosis without diverticulitis. There are  no dilated bowel loops. There is no free fluid or free air. There are no  enlarged lymph nodes. There is no aggressive bony lesion.          Impression IMPRESSION:   Mildly increased inflammation in the left perianal fat and left gluteal  subcutaneous soft tissues without evidence for abscess or other drainable  collection. HOSPITAL COURSE & TREATMENT RENDERED:   1. See today's progress note:    Daily Progress Note and Discharge Note: 7/31/2018  Una Felty, MD         Assessment/Plan:   Sepsis POA due to below:Fever (7/13/2018)/  Leukocytosis (7/14/2018)/  SIRS (systemic inflammatory response syndrome) ([facility] [address] (7/14/2018): in the setting of immunosuppression.   --broad spectrum abx - pip-tazo and Doxy-  completed 7/30 - Augmentin 875mg BID and Doxy 100mg bid outpt x 6 days   --WBC improving  --F/U outpt with Heme-Onc and PCP later this wk     Rhona-rectal abscess (7/14/2018): left side. S/p I+D on 7/14 and 7/25  --pelvic CT did not show pelvic abscess- repeat CT neg  --[facility] for wound care.        Myelofibrosis ([facility] [address] (7/14/2018)/ Anemia/Thrombocytopenia ([facility] [address] (7/14/2018): follows up at [facility]. Hgb stable in 7s  -- F/U with Heme-Onc outpt      Hypokalemia (7/14/2018): resolved. follow K+ outpt      Nausea and vomiting (7/14/2018): likely from infection as above. resolved     Constipation  --Stool softeners or other OTC tx as needed           Subjective:   Ms. Ramos is a 75 y. o. female who is being admitted for Fever. Ms. Ramos presented to our Emergency Department today complaining of intermittent fever and chills associated with generalized weakness. She has also been having nausea and vomiting but denies any headaches, flu like or respiratory symptoms, No abdominal discomfort or diarrhea. She has not has any urinary symptoms. No overt focal symptoms. She does have a Hx of myelofibrosis and follows up at [facility]. Work up thus far has been unremarkable. In light of her immunosuppression, she will be admitted for further management. [Dr Shannan Pereyra  7/14: Pt found to have perirectal abscess. Afebrile since 5AM.  Still nauseated. NPO for I+D in OR today with Dr Angel Zelaya.        7/15: continues to be nauseated.   Labs pending this AM.  Surgeon doesn't feel it is much better despite I+D yesterday. Pt says pain is better controlled with percocet.      7/16/18: K+ 2.6. Repleting with IV. (6 total) WBC a little better. Tmax 98. 8.      7/17:  Pt reports she is feeling somewhat better. Dr Doyle Aguirre from ([address] [place] [address]) called last night (see note from last night) and wanted Mario 2 inhibitor restarted at 1/2 dose and weaned off from there. WBC remains 15K. K+ back down - replacing. Tmax 101.9. Tnow 98.6. Diuresed about a liter overnight. Checking Mag also.       7/18:  Reports \"starting to feel better. \"  Little pain from surg site. Tmax 99. K+ 3.1 - cont to replace. WBC remains at 15K. LFTs sl up - cont to monitor.      7/19/18: Less pain. Finally had a BM and feels better. WBC still up. Tmax 99.7     7/20:  She reports she feels better but remains very weak. Cont to need IV antibiotics. WBC up to 20K. Tmax 99. K+ normalized. LFTs better.       7/21:  Tmax 99. WBC up again. Will repeat CT of the pelvis as her WBC is higher. She is having less pain.      7/22/18: Feeling pretty well. WBC is still high but repeat CT was neg for abscess. Will ask surgery to see again tomorrow.      7/23/18: Continues to grad feel better and wants to go home today. Will ask surgery to see again and if nothing else needs to be done then home later today. She wants to leave corcoran in place as urine irritates her abscess site. WBC elevated but discussed with Heme-Onc and may be due to the Myelofibrosis but she is to follow up with Dr Doyle Aguirre at [facility] later this wk or early next wk. Will discuss with ID regarding outpt antibiotics. 7/24/18: Feeling a bit better today. For OR today for repeat I&D. WBC 22.      7/25/18: For OR today. No complaints. WBC 23. She is NPO     7/26/18: Went back to the OR last night. Having some pain but tolerable. WBC up. Tmax 99.5     7/27/18: WBC is better at 13.9. Hb down to 7.9. Tmax 99.2. Feeling pretty well.      7/28/18: WBC still coming down. Hb 8.1. Afebrile. Little pain. Will complete IV antibiotic regimen on Monday. Has been cleared by surgery. Hopefully can go home on Tues.      :  Hb 7.8 - watching. She is having more constipation and has not had a BM. Has been taking senokot without relief. Having more gas.      :  Still c/o constipation but thinks \"something will happen today. \"  Little pain. Wants to try with corcoran out and if no irritation of surg site then leave out. DC IV antibiotics and start Augmentin po. Discussed with Surg and will look at site and see if anything else needs to be done. Hb 7.9 - stable. Hopefully planning to DC to home tomorrow baring other complications.      :  Feeling better and ready to go home she reports. 3 BMs yesterday and no pain from surg site. Tolerated corcoran removal and no irritation with urination. She knows how to do her own packing. [facility] will see her outpt. She has appts for follow up with PCP and Heme-Onc she reports. Outpt Doxy and Augmentin x 6 days.       Review of Systems:   A comprehensive review of systems was negative except for that written in the HPI.     Objective:   Physical Exam:           Visit Vitals    /74 (BP 1 Location: Left arm, BP Patient Position: At rest)    Pulse 89    Temp 98.5 °F (36.9 °C)    Resp 16    Ht 5' 6\" (1.676 m)    Wt 64.2 kg (141 lb 8.6 oz)    SpO2 97%    BMI 22.84 kg/m2    O2 Flow Rate (L/min): 2 l/min O2 Device: Room air     Temp (24hrs), Av.4 °F (36.9 °C), Min:98 °F (36.7 °C), Max:98.8 °F (37.1 °C)         1901 -  0700  In: -   Out: 8765 [Urine:3530]     General:  Alert, cooperative, no distress, appears stated age   Head:  Normocephalic, without obvious abnormality, atraumatic. Eyes:  Conjunctivae/corneas clear. PERRL, EOMs intact. Nose: Nares normal. Septum midline.     Throat: Lips, mucosa, and tongue normal   Neck: Supple, symmetrical, trachea midline, no adenopathy, thyroid: no enlargement/tenderness/nodules, no carotid bruit and no JVD. Back:   Symmetric, no curvature. ROM normal. No CVA tenderness. Lungs:   Clear to auscultation   Chest wall:  No tenderness or deformity. Heart:  Regular rate and rhythm, S1, S2 normal, 1/9 ASHA, click, rub or gallop. Abdomen:   Soft, non-tender. Bowel sounds normal. No masses,  No organomegaly. : corcoran patent, labia with packing present   Extremities: Extremities normal, atraumatic, no cyanosis or edema. No calf tenderness or cords. Pulses: 2+ and symmetric all extremities. Skin: Skin color, texture, turgor normal. No rashes or lesions   Neurologic: CNII-XII intact. Alert and oriented X 3. Fine motor of hands and fingers normal.   equal.  Gait not tested at this time. Sensation grossly normal to touch.   Gross motor of extremities normal.         Signed:  Jeremias Spence MD  7/31/2018  8:25 AM

## 2018-08-01 ENCOUNTER — TELEPHONE (OUTPATIENT)
Dept: SURGERY | Age: 73
End: 2018-08-01

## 2018-08-01 NOTE — TELEPHONE ENCOUNTER
Called pt to follow up after surgery. How are they doing:good  Are they in pain:no  Are they using their pain meds:unknown, pt takes her own med  Are you having any nausea or vomiting:no  How is your appetite (eating & drinking):good  Normal BM & urine output:yes  Do they have a drain:no  Are they keeping track of output:n/s  Did they review their discharge instructions:yes  Any other concerns:questions about Home Health    Pt will call with any other problems or questions.

## 2022-09-04 NOTE — ANESTHESIA PREPROCEDURE EVALUATION
Anesthetic History   No history of anesthetic complications            Review of Systems / Medical History  Patient summary reviewed, nursing notes reviewed and pertinent labs reviewed    Pulmonary  Within defined limits                 Neuro/Psych   Within defined limits           Cardiovascular  Within defined limits                Exercise tolerance: >4 METS     GI/Hepatic/Renal  Within defined limits              Endo/Other        Anemia     Other Findings   Comments: Myelofibrosis, hg =5.5 this morning, receiving 2nd transfusion, plts=69           Physical Exam    Airway  Mallampati: II  TM Distance: 4 - 6 cm  Neck ROM: normal range of motion   Mouth opening: Normal     Cardiovascular    Rhythm: regular  Rate: normal         Dental    Dentition: Lower dentition intact, Upper dentition intact and Caps/crowns     Pulmonary  Breath sounds clear to auscultation               Abdominal         Other Findings            Anesthetic Plan    ASA: 2  Anesthesia type: general          Induction: Intravenous  Anesthetic plan and risks discussed with: Patient
gray sweat pants

## 2024-02-21 NOTE — PROGRESS NOTES
Patient was contacted and assisted by RN.       Problem: Falls - Risk of  Goal: *Absence of Falls  Document Marcus Fall Risk and appropriate interventions in the flowsheet. Fall Risk Interventions:  Mobility Interventions: Patient to call before getting OOB, Bed/chair exit alarm    Mentation Interventions: Bed/chair exit alarm, Adequate sleep, hydration, pain control, Increase mobility, More frequent rounding    Medication Interventions: Patient to call before getting OOB, Teach patient to arise slowly, Evaluate medications/consider consulting pharmacy    Elimination Interventions: Call light in reach, Patient to call for help with toileting needs, Bed/chair exit alarm    History of Falls Interventions: Bed/chair exit alarm, Door open when patient unattended. ...    1500- Bedside and Verbal shift change report given to [address] [place] [address]  (oncoming nurse) by Qian Winston  (offgoing nurse). Report included the following information SBAR, Kardex and Recent Results. .    1900- Bedside and Verbal shift change report given to    [place] [id number]  (oncoming nurse) by Florinda Magana RN  (offgoing nurse). Report included the following information SBAR, Kardex and Recent Results. ..     2000- Notify MD regarding pt blood result. No order received @ present time. Candance Huger
